# Patient Record
Sex: FEMALE | Race: WHITE | Employment: FULL TIME | URBAN - METROPOLITAN AREA
[De-identification: names, ages, dates, MRNs, and addresses within clinical notes are randomized per-mention and may not be internally consistent; named-entity substitution may affect disease eponyms.]

---

## 2017-01-06 ENCOUNTER — TELEPHONE (OUTPATIENT)
Dept: NURSING | Facility: CLINIC | Age: 52
End: 2017-01-06

## 2017-01-06 ENCOUNTER — TELEPHONE (OUTPATIENT)
Dept: INTERNAL MEDICINE | Facility: CLINIC | Age: 52
End: 2017-01-06

## 2017-01-06 DIAGNOSIS — F98.8 ADD (ATTENTION DEFICIT DISORDER): Primary | ICD-10-CM

## 2017-01-06 RX ORDER — METHYLPHENIDATE HYDROCHLORIDE 20 MG/1
20 TABLET ORAL 2 TIMES DAILY
Qty: 60 TABLET | Refills: 0 | Status: SHIPPED | OUTPATIENT
Start: 2017-01-06 | End: 2017-02-06

## 2017-01-06 NOTE — TELEPHONE ENCOUNTER
Jayleen called the nurse triage line 01/06/2017 @ 3008. She is requesting a refill of Ritalin. She says she only has enough left for today and tomorrow (saturday 01/07/2017). Please call her when the script is ready to be picked up, [phone]. Thank you.     Jenna Styles RN  FNA    Routed to: Dr. Castellanos and KVNG rolon

## 2017-01-06 NOTE — TELEPHONE ENCOUNTER
Call Type: Triage Call    Presenting Problem: Jayleen is requesting a refill of Ritalin. Message  sent to PCP. Message sent ot Dr. Castellanos.  Triage Note:  Guideline Title: Medication Questions - Adult  Recommended Disposition: Provide Health Information  Original Inclination: Wanted to speak with a nurse  Override Disposition:  Intended Action: Follow advice given  Physician Contacted: No  Caller has medication question(s) that was answered with available resources ?  YES  Pregnant and has medication questions regarding prescribed and/or nonprescribed  medication(s) not covered by available resources ? NO  Breastfeeding and has medication questions regarding prescribed and/or  nonprescribed medication(s) not covered by available resources ? NO  Requested information on how to safely dispose of  or unused medications ?  NO  Sign(s) or symptom(s) associated with a diagnosed condition or with a new illness  ? NO  Prescription ordered today and not available at pharmacy putting patient at  clinical risk ? NO  Recurrence of a symptom(s) or illness post prescribed medication treatment AND  provider instructed patient to call if symptom(s) returned. ? NO  Unable to obtain prescribed medication related to available resources AND  situation poses immediate clinical risk ? NO  Pharmacy calling to clarify prescription order. ? NO  Requests refill of prescribed medication that does NOT have a valid refill; lack  of medication may cause clinical risk to patient if not available. ? NO  Has questions about prescribed and/or nonprescribed medications not covered by  available resources ? NO  Pharmacy calling with prescription question; answered per department policy. ? NO  Requests refill of prescribed medication without valid refills OR requests refill  of prescribed medication with valid refills but does not have prescription number  (no RX container); lack of medication does not put patient at clinical risk ? NO  Physician  Instructions:  Care Advice:

## 2017-01-20 DIAGNOSIS — F41.1 GAD (GENERALIZED ANXIETY DISORDER): Primary | ICD-10-CM

## 2017-01-20 RX ORDER — ALPRAZOLAM 0.5 MG
0.5 TABLET ORAL DAILY PRN
Qty: 30 TABLET | Refills: 0 | Status: SHIPPED | OUTPATIENT
Start: 2017-01-20 | End: 2017-02-17

## 2017-01-20 NOTE — TELEPHONE ENCOUNTER
xanax      Last Written Prescription Date:  12/22/16  Last Fill Quantity: 30,   # refills: 0  Last Office Visit with [facility] [facility] or  Health prescribing provider: 11/14/16  Future Office visit:       Routing refill request to provider for review/approval because:  Drug not on the [facility], [facility] or  Health refill protocol or controlled substance

## 2017-02-05 ENCOUNTER — TELEPHONE (OUTPATIENT)
Dept: INTERNAL MEDICINE | Facility: CLINIC | Age: 52
End: 2017-02-05

## 2017-02-05 DIAGNOSIS — F98.8 ADD (ATTENTION DEFICIT DISORDER): Primary | ICD-10-CM

## 2017-02-05 NOTE — TELEPHONE ENCOUNTER
Clinic Action Needed:Yes Please call patient [phone]  Reason for Call:Patient requesting refill of Ritalin. Reporting she is currently out of medication. Please call when prescription is available for .  Controlled Substance Refill Request for Ritalin  Problem List Complete:     checked in past 6 months?  11/14/16    Marilou Madrid RN  [facility]

## 2017-02-06 RX ORDER — METHYLPHENIDATE HYDROCHLORIDE 20 MG/1
20 TABLET ORAL 2 TIMES DAILY
Qty: 60 TABLET | Refills: 0 | Status: SHIPPED | OUTPATIENT
Start: 2017-02-06 | End: 2017-03-03

## 2017-02-06 NOTE — TELEPHONE ENCOUNTER
Ritalin      Last Written Prescription Date:  1/6/17  Last Fill Quantity: 60,   # refills: 0  Last [place] Visit [place] [facility] [facility] or  Health prescribing provider: 11/14/16  Future Office visit:       Routing refill request to provider for review/approval because:  Drug not on the [facility], [facility] or  [facility] refill protocol or controlled substance

## 2017-02-17 DIAGNOSIS — F41.1 GAD (GENERALIZED ANXIETY DISORDER): ICD-10-CM

## 2017-02-17 RX ORDER — ALPRAZOLAM 0.5 MG
0.5 TABLET ORAL DAILY PRN
Qty: 30 TABLET | Refills: 0 | Status: SHIPPED | OUTPATIENT
Start: 2017-02-17 | End: 2017-03-17

## 2017-02-17 NOTE — TELEPHONE ENCOUNTER
XANAX      Last Written Prescription Date:  01/20/17  Last Fill Quantity: 30,   # refills: 0  Last [place] Visit [place] [facility] [facility] or  Health prescribing provider: 11/04/16  Future Office visit:       Routing refill request to provider for review/approval because:  Drug not on the [facility], [facility] or  Health refill protocol or controlled substance

## 2017-03-03 ENCOUNTER — TELEPHONE (OUTPATIENT)
Dept: NURSING | Facility: CLINIC | Age: 52
End: 2017-03-03

## 2017-03-03 DIAGNOSIS — F98.8 ADD (ATTENTION DEFICIT DISORDER): ICD-10-CM

## 2017-03-03 RX ORDER — METHYLPHENIDATE HYDROCHLORIDE 20 MG/1
20 TABLET ORAL 2 TIMES DAILY
Qty: 60 TABLET | Refills: 0 | Status: SHIPPED | OUTPATIENT
Start: 2017-03-03 | End: 2017-04-03

## 2017-03-03 NOTE — TELEPHONE ENCOUNTER
Needs a ritalin refill. Pharmacy: [facility]. Has 4 days of it left. Please call with status today.  Kaylene Villarreal RN-[facility]

## 2017-03-17 DIAGNOSIS — F41.1 GAD (GENERALIZED ANXIETY DISORDER): ICD-10-CM

## 2017-03-17 RX ORDER — ALPRAZOLAM 0.5 MG
0.5 TABLET ORAL DAILY PRN
Qty: 30 TABLET | Refills: 0 | Status: SHIPPED | OUTPATIENT
Start: 2017-03-17 | End: 2017-04-11

## 2017-03-17 NOTE — TELEPHONE ENCOUNTER
Xanax      Last Written Prescription Date:  02/17/17  Last Fill Quantity: 30,   # refills: 0  [place] [facility] [facility] or  Health prescribing provider: 11/14/16  Future Office visit:       Routing refill request to provider for review/approval because:  Drug not on the [facility], [facility] or  Health refill protocol or controlled substance

## 2017-04-03 DIAGNOSIS — F98.8 ADD (ATTENTION DEFICIT DISORDER): ICD-10-CM

## 2017-04-03 RX ORDER — METHYLPHENIDATE HYDROCHLORIDE 20 MG/1
20 TABLET ORAL 2 TIMES DAILY
Qty: 60 TABLET | Refills: 0 | Status: SHIPPED | OUTPATIENT
Start: 2017-04-03 | End: 2017-04-28

## 2017-04-03 NOTE — TELEPHONE ENCOUNTER
Ritalin      Last Written Prescription Date:  3/3/17  Last Fill Quantity: 60,   # refills: 0  Last Office Visit with [facility], [facility] or  Health prescribing provider: 11/14/16  Future Office visit:       Routing refill request to provider for review/approval because:  Drug not on the [facility], [facility] or  Health refill protocol or controlled substance

## 2017-04-03 NOTE — TELEPHONE ENCOUNTER
Reason for Call:  Medication or medication refill:Refill    Do you use a [facility]?  Name of the pharmacy and phone number for the current request:  [facility]    Name of the medication requested: Ritalin    Other request: Pt has 4 pills left    Can we leave a detailed message on this number? YES    Phone number patient can be reached at: Cell number on file:    Telephone Information:   Mobile [phone]       Best Time: anytime    Call taken on 4/3/2017 at 9:54 AM by CADEN PALACIOS

## 2017-04-11 DIAGNOSIS — F41.1 GAD (GENERALIZED ANXIETY DISORDER): ICD-10-CM

## 2017-04-12 RX ORDER — ALPRAZOLAM 0.5 MG
TABLET ORAL
Qty: 30 TABLET | Refills: 0 | Status: SHIPPED | OUTPATIENT
Start: 2017-04-12 | End: 2017-05-14

## 2017-04-12 NOTE — TELEPHONE ENCOUNTER
Xanax      Last Written Prescription Date:  3/17/17  Last Fill Quantity: 30,   # refills: 0  Last Office Visit with [facility], [facility] or  Health prescribing provider: 11/14/16  Future Office visit:       Routing refill request to provider for review/approval because:  Drug not on the [facility], [facility] or  Health refill protocol or controlled substance    Signed CSA form in chart.

## 2017-04-28 DIAGNOSIS — F98.8 ADD (ATTENTION DEFICIT DISORDER): ICD-10-CM

## 2017-04-28 NOTE — TELEPHONE ENCOUNTER
Called pt to update pharmacy information. Rx should be due about 5/3/17.  Attempted to contact patient, no answer, left voice message to call back.      Ritalin      Last Written Prescription Date:  4/3/17  Last Fill Quantity: 60,   # refills: 0  Last Office Visit with [facility], [facility] or [facility] prescribing provider: 11/14/16  Future Office visit:       Routing refill request to provider for review/approval because:  Drug not on the [facility], [facility] or [facility] refill protocol or controlled substance.  Signed CSA form in chart.

## 2017-04-28 NOTE — TELEPHONE ENCOUNTER
Patient requesting a refill of ritalin. Please call her.  Kaylene Villarreal RN-[facility]

## 2017-05-01 RX ORDER — METHYLPHENIDATE HYDROCHLORIDE 20 MG/1
20 TABLET ORAL 2 TIMES DAILY
Qty: 60 TABLET | Refills: 0 | Status: SHIPPED | OUTPATIENT
Start: 2017-05-01 | End: 2017-05-30

## 2017-05-01 NOTE — TELEPHONE ENCOUNTER
Jayleen returning call.  She says she always uses [facility].  Would like rx taken to [facility] when it has been written.

## 2017-05-02 ENCOUNTER — TRANSFERRED RECORDS (OUTPATIENT)
Dept: HEALTH INFORMATION MANAGEMENT | Facility: CLINIC | Age: 52
End: 2017-05-02

## 2017-05-03 NOTE — TELEPHONE ENCOUNTER
Pt calling in to make sure we brought RX do [facility].    I called the pharmacy and they have that Rx ready for the pt.    Kalia BATES RN

## 2017-05-14 DIAGNOSIS — F41.1 GAD (GENERALIZED ANXIETY DISORDER): ICD-10-CM

## 2017-05-15 RX ORDER — ALPRAZOLAM 0.5 MG
TABLET ORAL
Qty: 30 TABLET | Refills: 0 | Status: SHIPPED | OUTPATIENT
Start: 2017-05-15 | End: 2017-06-11

## 2017-05-30 ENCOUNTER — TELEPHONE (OUTPATIENT)
Dept: INTERNAL MEDICINE | Facility: CLINIC | Age: 52
End: 2017-05-30

## 2017-05-30 DIAGNOSIS — F98.8 ADD (ATTENTION DEFICIT DISORDER): ICD-10-CM

## 2017-05-30 RX ORDER — METHYLPHENIDATE HYDROCHLORIDE 20 MG/1
20 TABLET ORAL 2 TIMES DAILY
Qty: 60 TABLET | Refills: 0 | Status: SHIPPED | OUTPATIENT
Start: 2017-05-30 | End: 2017-06-27

## 2017-05-30 NOTE — TELEPHONE ENCOUNTER
Reason for call:  Medication   If this is a refill request, has the caller requested the refill from the pharmacy already? No  Will the patient be using a [facility]? Yes  Name of the [facility]    Name of the medication requested: Ritalin    Other request: Will run out on 6/2    Phone number to reach patient:  Cell number on file:    Telephone Information:   Mobile [phone]       Best Time:  Anytime     Can we leave a detailed message on this number?  YES

## 2017-05-30 NOTE — TELEPHONE ENCOUNTER
Methylphenidate      Last Written Prescription Date:  05/01/17  Last Fill Quantity: 60,   # refills: 0  Last Office Visit with [facility] or [facility] prescribing provider: 11/14/16  Future Office visit:       Routing refill request to provider for review/approval because:  Drug not on the [facility], [facility] or [employer] [employer] refill protocol or controlled substance    NO CSA IN EPIC. [facility].    Please advise, thanks.

## 2017-06-11 DIAGNOSIS — F41.1 GAD (GENERALIZED ANXIETY DISORDER): ICD-10-CM

## 2017-06-12 RX ORDER — ALPRAZOLAM 0.5 MG
TABLET ORAL
Qty: 30 TABLET | Refills: 0 | Status: SHIPPED | OUTPATIENT
Start: 2017-06-12 | End: 2017-07-10

## 2017-06-23 ENCOUNTER — TRANSFERRED RECORDS (OUTPATIENT)
Dept: HEALTH INFORMATION MANAGEMENT | Facility: CLINIC | Age: 52
End: 2017-06-23

## 2017-06-27 ENCOUNTER — TELEPHONE (OUTPATIENT)
Dept: INTERNAL MEDICINE | Facility: CLINIC | Age: 52
End: 2017-06-27

## 2017-06-27 DIAGNOSIS — F98.8 ATTENTION DEFICIT DISORDER, UNSPECIFIED HYPERACTIVITY PRESENCE: Primary | ICD-10-CM

## 2017-06-27 RX ORDER — METHYLPHENIDATE HYDROCHLORIDE 20 MG/1
20 TABLET ORAL 2 TIMES DAILY
Qty: 60 TABLET | Refills: 0 | Status: SHIPPED | OUTPATIENT
Start: 2017-06-27 | End: 2017-07-25

## 2017-06-27 NOTE — TELEPHONE ENCOUNTER
Ritalin      Last Written Prescription Date:  05/30/17  Last Fill Quantity: 60,   # refills: 0  Last Office Visit with [facility] or  Health prescribing provider: 11/14/16  Future Office visit:       Routing refill request to provider for review/approval because:  Drug not on the [facility], [facility] or  Health refill protocol or controlled substance    CSA in epic. [facility] when [facility].    Please advise, thanks.

## 2017-06-27 NOTE — TELEPHONE ENCOUNTER
Reason for Call:  Medication or medication refill:    Do you use a [facility]?  [facility] of the [facility] and phone [facility] for the current request[facility] [phone]    Name of the medication requested: Ritalin    Other request: none    Can we leave a detailed message on this number? Not Applicable    Phone number patient can be reached at: Cell number on file:    Telephone Information:   Mobile [phone]       Best Time: anytime    Call taken on 6/27/2017 at 7:03 AM by Ada Mazariegos

## 2017-07-05 ENCOUNTER — TELEPHONE (OUTPATIENT)
Dept: INTERNAL MEDICINE | Facility: CLINIC | Age: 52
End: 2017-07-05

## 2017-07-05 DIAGNOSIS — N63.0 LUMP OR MASS IN BREAST: Primary | ICD-10-CM

## 2017-07-05 NOTE — TELEPHONE ENCOUNTER
Irasema ([phone]) [facility] calling. Patient had appt for screening mammo today and had told  she had a painful lump R breast but once she got there only the screening mammo was scheduled.  She can be added on today for diagnostic/US but need MD signature on order.  MISBAH Flor R.N.      [facility] diagnostic mammo and R breast US.

## 2017-07-05 NOTE — TELEPHONE ENCOUNTER
Patient finally left breast center after waiting there 1.5 hours.  She really wants to have diagnostic mammo ASAP.  Will partner please sign order or must pt be seen first.  MISBAH Flor R.N.

## 2017-07-10 DIAGNOSIS — F41.1 GAD (GENERALIZED ANXIETY DISORDER): ICD-10-CM

## 2017-07-10 NOTE — TELEPHONE ENCOUNTER
Xanax      Last Written Prescription Date:  06/12/17  Last Fill Quantity: 30,   # refills: 0  Last Office Visit with [facility], [facility] or [facility] prescribing provider: 11/14/16  Future Office visit:    Next 5 appointments (look out 90 days)     Jul 19, 2017  3:00 PM CDT   [facility]    [address] [place] [id number] [phone]                   Routing refill request to provider for review/approval because:  Drug not on the [facility], [facility] or [facility] refill protocol or controlled substance

## 2017-07-13 RX ORDER — ALPRAZOLAM 0.5 MG
TABLET ORAL
Qty: 30 TABLET | Refills: 0 | Status: SHIPPED | OUTPATIENT
Start: 2017-07-13 | End: 2017-08-16

## 2017-07-17 DIAGNOSIS — I10 ESSENTIAL HYPERTENSION: ICD-10-CM

## 2017-07-18 ENCOUNTER — HOSPITAL ENCOUNTER (OUTPATIENT)
Dept: MAMMOGRAPHY | Facility: CLINIC | Age: 52
End: 2017-07-18
Attending: INTERNAL MEDICINE
Payer: COMMERCIAL

## 2017-07-18 ENCOUNTER — HOSPITAL ENCOUNTER (OUTPATIENT)
Dept: ULTRASOUND IMAGING | Facility: CLINIC | Age: 52
Discharge: HOME | End: 2017-07-18
Attending: INTERNAL MEDICINE | Admitting: INTERNAL MEDICINE
Payer: COMMERCIAL

## 2017-07-18 DIAGNOSIS — N63.0 LUMP OR MASS IN BREAST: ICD-10-CM

## 2017-07-18 PROCEDURE — 76642 ULTRASOUND BREAST LIMITED: CPT | Mod: RT

## 2017-07-18 PROCEDURE — G0204 DX MAMMO INCL CAD BI: HCPCS

## 2017-07-18 RX ORDER — LISINOPRIL 20 MG/1
20 TABLET ORAL DAILY
Qty: 30 TABLET | Refills: 0 | Status: SHIPPED | OUTPATIENT
Start: 2017-07-18 | End: 2017-08-09

## 2017-07-18 RX ORDER — HYDROCHLOROTHIAZIDE 12.5 MG/1
12.5 CAPSULE ORAL DAILY
Qty: 30 CAPSULE | Refills: 0 | Status: SHIPPED | OUTPATIENT
Start: 2017-07-18 | End: 2017-08-09

## 2017-07-25 ENCOUNTER — DOCUMENTATION ONLY (OUTPATIENT)
Dept: LAB | Facility: CLINIC | Age: 52
End: 2017-07-25

## 2017-07-25 ENCOUNTER — TRANSFERRED RECORDS (OUTPATIENT)
Dept: HEALTH INFORMATION MANAGEMENT | Facility: CLINIC | Age: 52
End: 2017-07-25

## 2017-07-25 ENCOUNTER — TELEPHONE (OUTPATIENT)
Dept: INTERNAL MEDICINE | Facility: CLINIC | Age: 52
End: 2017-07-25

## 2017-07-25 DIAGNOSIS — F98.8 ATTENTION DEFICIT DISORDER, UNSPECIFIED HYPERACTIVITY PRESENCE: ICD-10-CM

## 2017-07-25 LAB
ALT SERPL-CCNC: 28 U/L (ref 0–78)
AST SERPL-CCNC: 17 U/L (ref 0–37)
CREAT SERPL-MCNC: 1.43 MG/DL (ref 0.6–1.3)
GLUCOSE SERPL-MCNC: 90 MG/DL (ref 70–99)
POTASSIUM SERPL-SCNC: 3.7 MMOL/L (ref 3.5–5.1)

## 2017-07-25 RX ORDER — METHYLPHENIDATE HYDROCHLORIDE 20 MG/1
20 TABLET ORAL 2 TIMES DAILY
Qty: 60 TABLET | Refills: 0 | Status: SHIPPED | OUTPATIENT
Start: 2017-07-25 | End: 2017-08-21

## 2017-07-25 NOTE — TELEPHONE ENCOUNTER
Pt calls saying that she is losing wt that she hasn't tried to lose.  Her back and side have been hurting.  She is very concerned that she might have some sort of cancer.  Says her grandfather  of Pancreatic Cancer, and she just doesn't feel good.  Would like to have lab work checking Thyroid, Lipids, CMP, Cancer Marker, maybe hormone check for menopause?    Dr Castellanos prefers to see her first before he orders labs, so she was advised of this.      Also she says that she ok'd it with her insurance to have her physical early.

## 2017-07-25 NOTE — TELEPHONE ENCOUNTER
Pt calls requesting refill of Ritalin.    Last written 6/27/17, #60 with 0 refills.    Has appt scheduled at the end of the week.    Please take it to [facility] when it is written.

## 2017-07-31 ENCOUNTER — OFFICE VISIT (OUTPATIENT)
Dept: INTERNAL MEDICINE | Facility: CLINIC | Age: 52
End: 2017-07-31
Payer: COMMERCIAL

## 2017-07-31 VITALS
BODY MASS INDEX: 28.32 KG/M2 | DIASTOLIC BLOOD PRESSURE: 70 MMHG | OXYGEN SATURATION: 96 % | HEIGHT: 65 IN | SYSTOLIC BLOOD PRESSURE: 100 MMHG | RESPIRATION RATE: 12 BRPM | WEIGHT: 170 LBS | HEART RATE: 100 BPM | TEMPERATURE: 99.3 F

## 2017-07-31 DIAGNOSIS — E78.2 MIXED HYPERLIPIDEMIA: ICD-10-CM

## 2017-07-31 DIAGNOSIS — Z00.00 ROUTINE GENERAL MEDICAL EXAMINATION AT A HEALTH CARE FACILITY: Primary | ICD-10-CM

## 2017-07-31 DIAGNOSIS — F98.8 ATTENTION DEFICIT DISORDER, UNSPECIFIED HYPERACTIVITY PRESENCE: ICD-10-CM

## 2017-07-31 DIAGNOSIS — I10 ESSENTIAL HYPERTENSION, BENIGN: ICD-10-CM

## 2017-07-31 PROCEDURE — 99396 PREV VISIT EST AGE 40-64: CPT | Performed by: INTERNAL MEDICINE

## 2017-07-31 NOTE — NURSING NOTE
"Chief Complaint   Patient presents with     Physical       Initial /70 (BP Location: Left arm, Patient Position: Chair, Cuff Size: Adult Large)  Pulse 100  Temp 99.3  F (37.4  C) (Oral)  Resp 12  Ht 5' 5\" (1.651 m)  Wt 170 lb (77.1 kg)  LMP 06/30/2017 (Approximate)  SpO2 96%  BMI 28.29 kg/m2 Estimated body mass index is 28.29 kg/(m^2) as calculated from the following:    Height as of this encounter: 5' 5\" (1.651 m).    Weight as of this encounter: 170 lb (77.1 kg).  Medication Reconciliation: complete     GRACE Garcia      "

## 2017-07-31 NOTE — PROGRESS NOTES
SUBJECTIVE:   CC: Jayleen Lang is an 51 year old woman who presents for preventive health visit.     Healthy Habits:    Do you get at least three servings of calcium containing foods daily (dairy, green leafy vegetables, etc.)? Some    Amount of exercise or daily activities, outside of work: 3 to 4 day(s) per week    Problems taking medications regularly No    Medication side effects: No    Have you had an eye exam in the past two years? yes    Do you see a dentist twice per year? yes    Do you have sleep apnea, excessive snoring or daytime drowsiness?no    Breast Ultrasound results  Recheck after Tampon was in for 3 weeks.           Today's PHQ-2 Score: PHQ-2 ( 1999 [employer]) 11/14/2016 8/22/2016   Q1: Little interest or pleasure in doing things 0 0   Q2: Feeling down, depressed or hopeless 0 0   PHQ-2 Score 0 0       Abuse: Current or Past(Physical, Sexual or Emotional)- No  Do you feel safe in your environment - Yes    Social History   Substance Use Topics     Smoking status: Never Smoker     Smokeless tobacco: Never Used     Alcohol use Yes      Comment: 5 beers a week     The patient does not drink >3 drinks per day nor >7 drinks per week.    Reviewed orders with patient.  Reviewed health maintenance and updated orders accordingly - Yes  Labs reviewed in [facility]    Patient over age 50, mutual decision to screen reflected in health maintenance.      Pertinent mammograms are reviewed under the imaging tab.  History of abnormal Pap smear: NO - age 30- 65 PAP every 3 years recommended    Reviewed and updated as needed this visit by clinical staffTobacco  Allergies  Meds  Soc Hx        Reviewed and updated as needed this visit by Provider      Has H/O hyperlipidemia. On medical treatment and diet. No side effects. No muscle weakness, myalgias or upset stomach.   Has h/o HTN. on medical treatment. BP has been controlled. No side effects from medications. No CP, HA, dizziness. good compliance with medications and low  "salt diet.  Wants to try to go off BP and lipid medications, needs recheck         ROS:  C: NEGATIVE for fever, chills, change in weight  I: NEGATIVE for worrisome rashes, moles or lesions  E: NEGATIVE for vision changes or irritation  ENT: NEGATIVE for ear, mouth and throat problems  R: NEGATIVE for significant cough or SOB  B: NEGATIVE for masses, tenderness or discharge  CV: NEGATIVE for chest pain, palpitations or peripheral edema  GI: NEGATIVE for nausea, abdominal pain, heartburn, or change in bowel habits  : NEGATIVE for unusual urinary or vaginal symptoms. Periods are regular.  M: NEGATIVE for significant arthralgias or myalgia  N: NEGATIVE for weakness, dizziness or paresthesias  P: NEGATIVE for changes in mood or affect    OBJECTIVE:   /70 (BP Location: Left arm, Patient Position: Chair, Cuff Size: Adult Large)  Pulse 100  Temp 99.3  F (37.4  C) (Oral)  Resp 12  Ht 5' 5\" (1.651 m)  Wt 170 lb (77.1 kg)  LMP 06/30/2017 (Approximate)  SpO2 96%  BMI 28.29 kg/m2  EXAM:  GENERAL: healthy, alert and no distress  EYES: Eyes grossly normal to inspection, PERRL and conjunctivae and sclerae normal  HENT: ear canals and TM's normal, nose and mouth without ulcers or lesions  NECK: no adenopathy, no asymmetry, masses, or scars and thyroid normal to palpation  RESP: lungs clear to auscultation - no rales, rhonchi or wheezes  BREAST: normal without masses, tenderness or nipple discharge and no palpable axillary masses or adenopathy  CV: regular rate and rhythm, normal S1 S2, no S3 or S4, no murmur, click or rub, no peripheral edema and peripheral pulses strong  ABDOMEN: soft, nontender, no hepatosplenomegaly, no masses and bowel sounds normal  MS: no gross musculoskeletal defects noted, no edema  SKIN: no suspicious lesions or rashes  NEURO: Normal strength and tone, mentation intact and speech normal  PSYCH: mentation appears normal, affect normal/bright    ASSESSMENT/PLAN:       ICD-10-CM    1. Routine " "general medical examination at a health care facility Z00.00 Lipid panel reflex to direct LDL     TSH with free T4 reflex     Basic metabolic panel   2. Essential hypertension, benign I10 TSH with free T4 reflex     Basic metabolic panel   3. Mixed hyperlipidemia E78.2 Lipid panel reflex to direct LDL   4. Attention deficit disorder, unspecified hyperactivity presence F98.8        COUNSELING:   Reviewed preventive health counseling, as reflected in patient instructions       Regular exercise       Healthy diet/nutrition       Vision screening       Hearing screening       Colon cancer screening         reports that she has never smoked. She has never used smokeless tobacco.    Estimated body mass index is 28.29 kg/(m^2) as calculated from the following:    Height as of this encounter: 5' 5\" (1.651 m).    Weight as of this encounter: 170 lb (77.1 kg).   Weight management plan: Discussed healthy diet and exercise guidelines and patient will follow up in 12 months in clinic to re-evaluate.    Counseling Resources:  ATP IV Guidelines  Pooled Cohorts Equation Calculator  Breast Cancer Risk Calculator  FRAX Risk Assessment  ICSI Preventive Guidelines  Dietary Guidelines for Americans, 2010  USDA's MyPlate  ASA Prophylaxis  Lung CA Screening    New Castellanos MD  [facility]  "

## 2017-07-31 NOTE — MR AVS SNAPSHOT
After Visit Summary   7/31/2017    Jayleen Lang    MRN: [id number]           Patient Information     Date Of Birth          1965        Visit Information        Provider Department      7/31/2017 1:00 PM New Castellanos MD [facility]        Today's Diagnoses     Routine general medical examination at a health care facility    -  1    Essential hypertension, benign        Mixed hyperlipidemia        Attention deficit disorder, unspecified hyperactivity presence          Care Instructions      Preventive Health Recommendations  Female Ages 40 to 49    Yearly exam:     See your health care provider every year in order to  1. Review health changes.   2. Discuss preventive care.    3. Review your medicines if your doctor prescribed any.      Get a Pap test every three years (unless you have an abnormal result and your provider advises testing more often).      If you get Pap tests with HPV test, you only need to test every 5 years, unless you have an abnormal result. You do not need a Pap test if your uterus was removed (hysterectomy) and you have not had cancer.      You should be tested each year for STDs (sexually transmitted diseases), if you're at risk.       Ask your doctor if you should have a mammogram.      Have a colonoscopy (test for colon cancer) if someone in your family has had colon cancer or polyps before age 50.       Have a cholesterol test every 5 years.       Have a diabetes test (fasting glucose) after age 45. If you are at risk for diabetes, you should have this test every 3 years.    Shots: Get a flu shot each year. Get a tetanus shot every 10 years.     Nutrition:     Eat at least 5 servings of fruits and vegetables each day.    Eat whole-grain bread, whole-wheat pasta and brown rice instead of white grains and rice.    Talk to your provider about Calcium and Vitamin D.     Lifestyle    Exercise at least 150 minutes a week (an average of 30 minutes a day, 5  "days a week). This will help you control your weight and prevent disease.    Limit alcohol to one drink per day.    No smoking.     Wear sunscreen to prevent skin cancer.    See your dentist every six months for an exam and cleaning.          Follow-ups after your visit        Future tests that were ordered for you today     Open Future Orders        Priority Expected Expires Ordered    Lipid panel reflex to direct LDL Routine  9/30/2017 7/31/2017    TSH with free T4 reflex Routine  9/30/2017 7/31/2017    Basic metabolic panel Routine  9/30/2017 7/31/2017            Who to contact     If you have questions or need follow up information about today's clinic visit or your schedule please contact [facility] directly at [phone].  Normal or non-critical lab and imaging results will be communicated to you by [employer], letter or phone within 4 business days after the clinic has received the results. If you do not hear from us within 7 days, please contact the clinic through [employer] or phone. If you have a critical or abnormal lab result, we will notify you by phone as soon as possible.  Submit refill requests through [employer] M[employer] or call your pharmacy and they will forward the refill request to us. Please allow 3 business days for your refill to be completed.          Additional Information About Your Visit        Order Mapper Information     Order Mapper lets you send messages to your doctor, view your test results, renew your prescriptions, schedule appointments and more. To sign up, go to [email or web address] . Click on \"Log in\" on the left side of the screen, which will take you to the Welcome page. Then click on \"Sign up Now\" on the right side of the page.     You will be asked to enter the access code listed below, as well as some personal information. Please follow the directions to create your username and password.     Your access code is: [email or web address]  Expires: 10/17/2017  3:50 PM     Your access code " "will  in 90 days. If you need help or a new code, please call your [facility] clinic or [phone].        Care EveryWhere ID     This is your Care EveryWhere ID. This could be used by other organizations to access your [facility] medical records  [id number]        Your Vitals Were     Pulse Temperature Respirations Height Last Period Pulse Oximetry    100 99.3  F (37.4  C) (Oral) 12 5' 5\" (1.651 m) 2017 (Approximate) 96%    BMI (Body Mass Index)                   28.29 kg/m2            Blood Pressure from Last 3 Encounters:   17 100/70   16 122/62   16 120/70    Weight from Last 3 Encounters:   17 170 lb (77.1 kg)   16 178 lb (80.7 kg)   16 180 lb (81.6 kg)                 Today's Medication Changes          These changes are accurate as of: 17  1:31 PM.  If you have any questions, ask your nurse or doctor.               These medicines have changed or have updated prescriptions.        Dose/Directions    fluticasone 50 MCG/ACT spray   Commonly known as:  FLONASE   This may have changed:    - when to take this  - reasons to take this   Used for:  Pollen allergies        Dose:  1-2 spray   Spray 1-2 sprays into both nostrils daily   Quantity:  48 g   Refills:  3                Primary Care Provider Office Phone # Fax #    New Castellanos [phone]       [facility] [address] [place] [address]        Equal Access to Services     BOUCHRA OSORIO AH: Hadcammie archer Sobrandy, waaxda luqadaha, qaybta kaalmada alycia, mendez armstrong. So [facility] [phone].    ATENCIÓN: Si habla español, tiene a villagomez disposición servicios gratuitos de asistencia lingüística. Llame al [phone].    We comply with applicable federal civil rights laws and [place] laws. We do not discriminate on the basis of race, color, national origin, age, disability sex, sexual orientation or gender identity.            Thank " you!     Thank you for choosing [facility]  for your care. Our goal is always to provide you with excellent care. Hearing back from our patients is one way we can continue to improve our services. Please take a few minutes to complete the written survey that you may receive in the mail after your visit with us. Thank you!             Your Updated Medication List - Protect others around you: Learn how to safely use, store and throw away your medicines at www.disposemymeds.org.          This list is accurate as of: 7/31/17  1:31 PM.  Always use your most recent med list.                   Brand Name Dispense Instructions for use Diagnosis    ALPRAZolam 0.5 MG tablet    XANAX    30 tablet    TAKE ONE TABLET BY MOUTH EVERY DAY AS NEEDED FOR ANXIETY    REBEKAH (generalized anxiety disorder)       cetirizine 10 MG tablet    zyrTEC    90 tablet    Take 1 tablet (10 mg) by mouth every evening    Seasonal allergies       EPINEPHrine 0.3 MG/0.3ML injection 2-pack    EPIPEN/ADRENACLICK/or ANY BX GENERIC EQUIV    2 each    Inject 0.3 mLs (0.3 mg) into the muscle once as needed for anaphylaxis    History of bee sting allergy       fluticasone 50 MCG/ACT spray    FLONASE    48 g    Spray 1-2 sprays into both nostrils daily    Pollen allergies       gabapentin 600 MG tablet    NEURONTIN    90 tablet    Take 1 tablet (600 mg) by mouth 3 times daily    Neuropathy (H)       hydrochlorothiazide 12.5 MG capsule    MICROZIDE    30 capsule    Take 1 capsule (12.5 mg) by mouth daily    Essential hypertension       lisinopril 20 MG tablet    PRINIVIL/ZESTRIL    30 tablet    Take 1 tablet (20 mg) by mouth daily    Essential hypertension       methylphenidate 20 MG tablet    RITALIN    60 tablet    Take 1 tablet (20 mg) by mouth 2 times daily    Attention deficit disorder, unspecified hyperactivity presence       mometasone 0.1 % cream    ELOCON    45 g    Apply sparingly to affected area twice daily as needed.  Do not apply to  face.    Angular cheilitis       simvastatin 20 MG tablet    ZOCOR    90 tablet    Take 1 tablet (20 mg) by mouth At Bedtime    Mixed hyperlipidemia

## 2017-08-02 DIAGNOSIS — I10 ESSENTIAL HYPERTENSION, BENIGN: ICD-10-CM

## 2017-08-02 DIAGNOSIS — E78.2 MIXED HYPERLIPIDEMIA: ICD-10-CM

## 2017-08-02 DIAGNOSIS — Z00.00 ROUTINE GENERAL MEDICAL EXAMINATION AT A HEALTH CARE FACILITY: ICD-10-CM

## 2017-08-02 LAB
ANION GAP SERPL CALCULATED.3IONS-SCNC: 8 MMOL/L (ref 3–14)
BUN SERPL-MCNC: 16 MG/DL (ref 7–30)
CALCIUM SERPL-MCNC: 9.4 MG/DL (ref 8.5–10.1)
CHLORIDE SERPL-SCNC: 105 MMOL/L (ref 94–109)
CHOLEST SERPL-MCNC: 185 MG/DL
CO2 SERPL-SCNC: 29 MMOL/L (ref 20–32)
CREAT SERPL-MCNC: 1.02 MG/DL (ref 0.52–1.04)
GFR SERPL CREATININE-BSD FRML MDRD: 57 ML/MIN/1.7M2
GLUCOSE SERPL-MCNC: 91 MG/DL (ref 70–99)
HDLC SERPL-MCNC: 81 MG/DL
LDLC SERPL CALC-MCNC: 94 MG/DL
NONHDLC SERPL-MCNC: 104 MG/DL
POTASSIUM SERPL-SCNC: 3.9 MMOL/L (ref 3.4–5.3)
SODIUM SERPL-SCNC: 142 MMOL/L (ref 133–144)
TRIGL SERPL-MCNC: 49 MG/DL
TSH SERPL DL<=0.005 MIU/L-ACNC: 0.86 MU/L (ref 0.4–4)

## 2017-08-02 PROCEDURE — 80061 LIPID PANEL: CPT | Performed by: INTERNAL MEDICINE

## 2017-08-02 PROCEDURE — 84443 ASSAY THYROID STIM HORMONE: CPT | Performed by: INTERNAL MEDICINE

## 2017-08-02 PROCEDURE — 80048 BASIC METABOLIC PNL TOTAL CA: CPT | Performed by: INTERNAL MEDICINE

## 2017-08-02 PROCEDURE — 36415 COLL VENOUS BLD VENIPUNCTURE: CPT | Performed by: INTERNAL MEDICINE

## 2017-08-09 DIAGNOSIS — I10 ESSENTIAL HYPERTENSION: ICD-10-CM

## 2017-08-09 RX ORDER — HYDROCHLOROTHIAZIDE 12.5 MG/1
12.5 CAPSULE ORAL DAILY
Qty: 90 CAPSULE | Refills: 3 | Status: SHIPPED | OUTPATIENT
Start: 2017-08-09 | End: 2018-08-11

## 2017-08-09 RX ORDER — LISINOPRIL 20 MG/1
TABLET ORAL
Qty: 90 TABLET | Refills: 3 | Status: SHIPPED | OUTPATIENT
Start: 2017-08-09 | End: 2018-08-11

## 2017-08-09 NOTE — TELEPHONE ENCOUNTER
Prescription's approved per [facility] Refill Protocol.

## 2017-08-09 NOTE — TELEPHONE ENCOUNTER
Hydrochlorothiazide      Last Written Prescription Date: 07/18/17  Last Fill Quantity: 30, # refills: 0  Last Office Visit with [facility], [facility] or [facility] prescribing provider: 07/31/17       Potassium   Date Value Ref Range Status   08/02/2017 3.9 3.4 - 5.3 mmol/L Final     Creatinine   Date Value Ref Range Status   08/02/2017 1.02 0.52 - 1.04 mg/dL Final     BP Readings from Last 3 Encounters:   07/31/17 100/70   11/14/16 122/62   08/29/16 120/70     Lisinopril      Last Written Prescription Date: 07/18/17  Last Fill Quantity: 30, # refills: 0  Last Office Visit with [facility], [facility] or [facility] prescribing provider: 07/31/17       Potassium   Date Value Ref Range Status   08/02/2017 3.9 3.4 - 5.3 mmol/L Final     Creatinine   Date Value Ref Range Status   08/02/2017 1.02 0.52 - 1.04 mg/dL Final     BP Readings from Last 3 Encounters:   07/31/17 100/70   11/14/16 122/62   08/29/16 120/70

## 2017-08-15 DIAGNOSIS — F41.1 GAD (GENERALIZED ANXIETY DISORDER): ICD-10-CM

## 2017-08-16 RX ORDER — ALPRAZOLAM 0.5 MG
TABLET ORAL
Qty: 30 TABLET | Refills: 0 | Status: SHIPPED | OUTPATIENT
Start: 2017-08-16 | End: 2017-09-13

## 2017-08-21 ENCOUNTER — TELEPHONE (OUTPATIENT)
Dept: INTERNAL MEDICINE | Facility: CLINIC | Age: 52
End: 2017-08-21

## 2017-08-21 DIAGNOSIS — F98.8 ATTENTION DEFICIT DISORDER, UNSPECIFIED HYPERACTIVITY PRESENCE: ICD-10-CM

## 2017-08-21 RX ORDER — METHYLPHENIDATE HYDROCHLORIDE 20 MG/1
20 TABLET ORAL 2 TIMES DAILY
Qty: 60 TABLET | Refills: 0 | Status: SHIPPED | OUTPATIENT
Start: 2017-08-21 | End: 2017-09-19

## 2017-08-21 NOTE — TELEPHONE ENCOUNTER
Reason for Call:  Medication or medication refill:Refill    Do you use a [facility]?  [facility] of the [facility] and [facility] the [facility] [address] [facility] - [phone]    Name of the medication requested: methylphenidate (RITALIN) 20 MG tablet    Other request: Due for refill     Can we leave a detailed message on this number? YES    Phone number patient can be reached at: [employer] [phone]    Best Time: anytime    Call taken on 8/21/2017 at 8:53 AM by CADEN PALACIOS

## 2017-08-21 NOTE — TELEPHONE ENCOUNTER
Methylphenidate (Ritalin)    Last Written Prescription Date:  7/25/2017  Last Fill Quantity: 60,   # refills: 0  Last Office Visit with [facility], [facility] or [facility] prescribing provider: 7/31/2017  Future Office visit:       Routing refill request to provider for review/approval because:  Drug not on the [facility], [facility] or [facility] refill protocol or controlled substance

## 2017-08-26 ENCOUNTER — HEALTH MAINTENANCE LETTER (OUTPATIENT)
Age: 52
End: 2017-08-26

## 2017-08-30 DIAGNOSIS — Z91.030 HISTORY OF BEE STING ALLERGY: ICD-10-CM

## 2017-08-30 NOTE — TELEPHONE ENCOUNTER
EPIPEN      Last Written Prescription Date: 08/29/16  Last Fill Quantity: 2,  # refills: 3   Last Office Visit with [facility], [facility] or [facility] prescribing provider: 07/31/17

## 2017-08-31 RX ORDER — EPINEPHRINE 0.3 MG/.3ML
0.3 INJECTION SUBCUTANEOUS
Qty: 0.3 ML | Refills: 1 | Status: SHIPPED | OUTPATIENT
Start: 2017-08-31 | End: 2018-06-12

## 2017-09-13 DIAGNOSIS — F41.1 GAD (GENERALIZED ANXIETY DISORDER): ICD-10-CM

## 2017-09-13 DIAGNOSIS — G62.9 NEUROPATHY: ICD-10-CM

## 2017-09-13 NOTE — TELEPHONE ENCOUNTER
XANAX      Last Written Prescription Date:  08/16/17  Last Fill Quantity: 30,   # refills: 0  Last [facility] Visit with [facility] or [facility] provider: 07/31/17  Future Office visit:    Next 5 appointments (look out 90 days)     Sep 19, 2017  5:00 PM CDT   Pre-Op physical with Ignacio Santos MD   [facility]    [address]  [facility] [address]   [phone]                   Routing refill request to provider for review/approval because:  Drug not on the [facility], [facility] or [facility] refill protocol or controlled substance

## 2017-09-14 NOTE — TELEPHONE ENCOUNTER
GABAPENTIN      Last Written Prescription Date:  12/14/16  Last Fill Quantity: 90,   # refills: 5  Last Office Visit with [facility] or [facility] prescribing provider: 07/31/17  Future Office visit:    Next 5 appointments (look out 90 days)     Sep 19, 2017  5:00 PM CDT   [facility]    [address]  [facility] [address]   [phone]                   Routing refill request to provider for review/approval because:  Drug not on the [facility], [facility] or [facility] refill protocol or controlled substance

## 2017-09-15 RX ORDER — GABAPENTIN 600 MG/1
TABLET ORAL
Qty: 90 TABLET | Refills: 4 | Status: SHIPPED | OUTPATIENT
Start: 2017-09-15 | End: 2018-05-24

## 2017-09-15 RX ORDER — ALPRAZOLAM 0.5 MG
TABLET ORAL
Qty: 30 TABLET | Refills: 0 | Status: SHIPPED | OUTPATIENT
Start: 2017-09-15 | End: 2017-10-10

## 2017-09-18 ENCOUNTER — TELEPHONE (OUTPATIENT)
Dept: INTERNAL MEDICINE | Facility: CLINIC | Age: 52
End: 2017-09-18

## 2017-09-18 DIAGNOSIS — F98.8 ATTENTION DEFICIT DISORDER, UNSPECIFIED HYPERACTIVITY PRESENCE: ICD-10-CM

## 2017-09-18 NOTE — TELEPHONE ENCOUNTER
(Reason for Call:  Medication or medication refill:    Do you use a [facility]?  Name of the pharmacy and phone number for the current request:  [facility] [address] [facility] [address] [place] - [phone]    Name of the medication requested: RITALIN    Other request: NO    Can we leave a detailed message on this number? YES    Phone number patient can be reached at: Cell number on file:    Telephone Information:   Mobile [phone]       Best Time: ANY    Call taken on 9/18/2017 at 4:06 PM by Mae Meek

## 2017-09-19 RX ORDER — METHYLPHENIDATE HYDROCHLORIDE 20 MG/1
20 TABLET ORAL 2 TIMES DAILY
Qty: 60 TABLET | Refills: 0 | Status: SHIPPED | OUTPATIENT
Start: 2017-09-19 | End: 2017-10-19

## 2017-09-19 NOTE — TELEPHONE ENCOUNTER
Ritalin     CSA signed.   Last Written Prescription Date:  8/21/17  Last Fill Quantity: 60,   # refills: 0    Last Office Visit with [facility], [facility] or [facility] prescribing provider: 7/31/17    Future Office visit:    Next 5 appointments (look out 90 days)     Sep 19, 2017  5:00 PM CDT   [facility]    [address]  [facility] [id number] [phone]                 Routing refill request to provider for review/approval because:  Drug not on the [facility], [facility] or [facility] refill protocol or controlled substance

## 2017-09-21 ENCOUNTER — OFFICE VISIT (OUTPATIENT)
Dept: INTERNAL MEDICINE | Facility: CLINIC | Age: 52
End: 2017-09-21
Payer: COMMERCIAL

## 2017-09-21 VITALS
OXYGEN SATURATION: 96 % | WEIGHT: 175 LBS | TEMPERATURE: 99 F | BODY MASS INDEX: 29.16 KG/M2 | SYSTOLIC BLOOD PRESSURE: 110 MMHG | DIASTOLIC BLOOD PRESSURE: 80 MMHG | HEIGHT: 65 IN | HEART RATE: 100 BPM

## 2017-09-21 DIAGNOSIS — M16.12 PRIMARY OSTEOARTHRITIS OF LEFT HIP: ICD-10-CM

## 2017-09-21 DIAGNOSIS — Z01.818 PREOP GENERAL PHYSICAL EXAM: Primary | ICD-10-CM

## 2017-09-21 LAB
ERYTHROCYTE [DISTWIDTH] IN BLOOD BY AUTOMATED COUNT: 13.9 % (ref 10–15)
HCT VFR BLD AUTO: 40.3 % (ref 35–47)
HGB BLD-MCNC: 13.2 G/DL (ref 11.7–15.7)
MCH RBC QN AUTO: 29.9 PG (ref 26.5–33)
MCHC RBC AUTO-ENTMCNC: 32.8 G/DL (ref 31.5–36.5)
MCV RBC AUTO: 91 FL (ref 78–100)
PLATELET # BLD AUTO: 238 10E9/L (ref 150–450)
RBC # BLD AUTO: 4.41 10E12/L (ref 3.8–5.2)
WBC # BLD AUTO: 6.9 10E9/L (ref 4–11)

## 2017-09-21 PROCEDURE — 87081 CULTURE SCREEN ONLY: CPT | Performed by: FAMILY MEDICINE

## 2017-09-21 PROCEDURE — 93000 ELECTROCARDIOGRAM COMPLETE: CPT | Performed by: FAMILY MEDICINE

## 2017-09-21 PROCEDURE — 87186 SC STD MICRODIL/AGAR DIL: CPT | Performed by: FAMILY MEDICINE

## 2017-09-21 PROCEDURE — 99214 OFFICE O/P EST MOD 30 MIN: CPT | Performed by: FAMILY MEDICINE

## 2017-09-21 PROCEDURE — 36415 COLL VENOUS BLD VENIPUNCTURE: CPT | Performed by: FAMILY MEDICINE

## 2017-09-21 PROCEDURE — 80048 BASIC METABOLIC PNL TOTAL CA: CPT | Performed by: FAMILY MEDICINE

## 2017-09-21 PROCEDURE — 85027 COMPLETE CBC AUTOMATED: CPT | Performed by: FAMILY MEDICINE

## 2017-09-21 NOTE — PROGRESS NOTES
[facility]  [address]  [facility] [address]  [phone]  Dept: [phone]    PRE-OP EVALUATION:  Today's date: 2017    Jayleen Lang (: 1965) presents for pre-operative evaluation assessment as requested by Dr. Andrzej Malagon .  She requires evaluation and anesthesia risk assessment prior to undergoing surgery/procedure for treatment of left hip  .  Proposed procedure: left hip replacement     Date of Surgery/ Procedure: 17  Time of Surgery/ Procedure: 7:00am   Hospital/Surgical Facility: [facility]     Primary Physician: New Castellanos  Type of Anesthesia Anticipated: Choice    Patient has a Health Care Directive or Living Will:  NO    1. NO - Do you have a history of heart attack, stroke, stent, bypass or surgery on an artery in the head, neck, heart or legs?  2. NO - Do you ever have any pain or discomfort in your chest?  3. NO - Do you have a history of  Heart Failure?  4. NO - Are you troubled by shortness of breath when: walking on the level, up a slight hill or at night?  5. NO - Do you currently have a cold, bronchitis or other respiratory infection?  6. NO - Do you have a cough, shortness of breath or wheezing?  7. NO - Do you sometimes get pains in the calves of your legs when you walk?  8. NO - Do you or anyone in your family have previous history of blood clots?  9. NO - Do you or does anyone in your family have a serious bleeding problem such as prolonged bleeding following surgeries or cuts?  10. NO - Have you ever had problems with anemia or been told to take iron pills?  11. NO - Have you had any abnormal blood loss such as black, tarry or bloody stools, or abnormal vaginal bleeding?  12. NO - Have you ever had a blood transfusion?  13. NO - Have you or any of your relatives ever had problems with anesthesia?  14. NO - Do you have sleep apnea, excessive snoring or daytime drowsiness?  16. NO - DO YOU HAVE PROSTHETIC  JOINTS?   16. NO - Do you have prosthetic joints?  17. NO - Is there any chance that you may be pregnant?        HPI:                                                      Brief HPI related to upcoming procedure:     She has advanced DJD in L hip. Plans NADIR.      No h/o Anesthesia complications.    MEDICAL HISTORY:                                                    Patient Active Problem List    Diagnosis Date Noted     Controlled substance agreement signed 2016     Priority: Medium     Chronic neck pain 11/15/2016     Priority: Medium     Essential hypertension, benign 2016     Priority: Medium     Allergic reaction 2016     Priority: Medium     To Bees  Airway closure       Mixed hyperlipidemia 2016     Priority: Medium     REBEKAH (generalized anxiety disorder) 2014     Priority: Medium     Attention deficit disorder, unspecified hyperactivity presence 08/15/2013     Priority: Medium     Obesity 2013     Priority: Medium     Menorrhagia 2012     Priority: Medium     Rash 2011     Priority: Medium     CARDIOVASCULAR SCREENING; LDL GOAL LESS THAN 160 10/31/2010     Priority: Medium      Past Medical History:   Diagnosis Date     ADD (attention deficit disorder)      Obesity      Past Surgical History:   Procedure Laterality Date     ARTHROSCOPIC REPAIR POSTERIOR CRUCIATE LIGAMENT        DELIVERY ONLY  10/1998     Foot surgery - bone spurs  2002     LAMINECT/DISCECTOMY, CERVICAL      C6-C7 Fusion.     Current Outpatient Prescriptions   Medication Sig Dispense Refill     methylphenidate (RITALIN) 20 MG tablet Take 1 tablet (20 mg) by mouth 2 times daily 60 tablet 0     ALPRAZolam (XANAX) 0.5 MG tablet TAKE ONE TABLET BY MOUTH EVERY DAY AS NEEDED FOR ANXIETY 30 tablet 0     gabapentin (NEURONTIN) 600 MG tablet TAKE ONE TABLET BY MOUTH 3 TIMES DAILY. 90 tablet 4     EPINEPHrine (EPIPEN/ADRENACLICK/OR ANY BX GENERIC EQUIV) 0.3 MG/0.3ML injection 2-pack Inject 0.3 mLs  "(0.3 mg) into the muscle once as needed for anaphylaxis 0.3 mL 1     hydrochlorothiazide (MICROZIDE) 12.5 MG capsule Take 1 capsule (12.5 mg) by mouth daily 90 capsule 3     lisinopril (PRINIVIL/ZESTRIL) 20 MG tablet TAKE ONE TABLET BY MOUTH DAILY 90 tablet 3     simvastatin (ZOCOR) 20 MG tablet Take 1 tablet (20 mg) by mouth At Bedtime 90 tablet 3     fluticasone (FLONASE) 50 MCG/ACT nasal spray Spray 1-2 sprays into both nostrils daily (Patient taking differently: Spray 1-2 sprays into both nostrils daily as needed ) 48 g 3     mometasone (ELOCON) 0.1 % cream Apply sparingly to affected area twice daily as needed.  Do not apply to face. 45 g 0     cetirizine (ZYRTEC) 10 MG tablet Take 1 tablet (10 mg) by mouth every evening 90 tablet 3     OTC products: NSAIDS    Allergies   Allergen Reactions     Bee Venom      Sulfa Drugs Hives      Latex Allergy: NO    Social History   Substance Use Topics     Smoking status: Never Smoker     Smokeless tobacco: Never Used     Alcohol use Yes      Comment: 5 beers a week     History   Drug Use No       REVIEW OF SYSTEMS:                                                    Constitutional, neuro, ENT, endocrine, pulmonary, cardiac, gastrointestinal, genitourinary, musculoskeletal, integument and psychiatric systems are negative, except as otherwise noted.      EXAM:                                                    /80  Pulse 100  Temp 99  F (37.2  C) (Oral)  Ht 5' 5\" (1.651 m)  Wt 175 lb (79.4 kg)  SpO2 96%  BMI 29.12 kg/m2    GENERAL APPEARANCE: healthy, alert and no distress     EYES: EOMI, PERRL     HENT: mouth without ulcers or lesions     NECK: no adenopathy, no asymmetry, masses, or scars and thyroid normal to palpation     RESP: lungs clear to auscultation -     CV: regular rates and rhythm, normal S1 S2, no murmur, click or rub     ABDOMEN:  soft, nontender, no HSM or masses      MS: decr ROM L hip     SKIN: no suspicious lesions or rashes     NEURO: Normal " strength and tone     PSYCH: mentation appears normal. and affect normal/bright    DIAGNOSTICS:                                                        EKG: RSR, rate 73, axis normal, ST segments and T waves normal, no ectopy. Tracing WNL.      Results for orders placed or performed in visit on 09/21/17   Basic metabolic panel  (Ca, Cl, CO2, Creat, Gluc, K, Na, BUN)   Result Value Ref Range    Sodium 140 133 - 144 mmol/L    Potassium 3.7 3.4 - 5.3 mmol/L    Chloride 103 94 - 109 mmol/L    Carbon Dioxide 28 20 - 32 mmol/L    Anion Gap 9 3 - 14 mmol/L    Glucose 88 70 - 99 mg/dL    Urea Nitrogen 20 7 - 30 mg/dL    Creatinine 0.92 0.52 - 1.04 mg/dL    GFR Estimate 65 >60 mL/min/1.7m2    GFR Estimate If Black 78 >60 mL/min/1.7m2    Calcium 9.2 8.5 - 10.1 mg/dL   CBC with platelets   Result Value Ref Range    WBC 6.9 4.0 - 11.0 10e9/L    RBC Count 4.41 3.8 - 5.2 10e12/L    Hemoglobin 13.2 11.7 - 15.7 g/dL    Hematocrit 40.3 35.0 - 47.0 %    MCV 91 78 - 100 fl    MCH 29.9 26.5 - 33.0 pg    MCHC 32.8 31.5 - 36.5 g/dL    RDW 13.9 10.0 - 15.0 %    Platelet Count 238 150 - 450 10e9/L   Methicillin resistant staph aureus cult   Result Value Ref Range    Specimen Description Nasal     Special Requests Specimen collected in eSwab transport (white cap)     Culture Micro Culture in progress          Recent Labs   Lab Test  08/02/17   0844 07/25/17 08/22/16   0740  03/22/16   0752  10/22/15   0720   06/24/10   0750   HGB   --    --    --   14.4  15.1   < >  13.6   PLT   --    --    --   287  318   < >   --    INR   --    --    --    --    --    --   0.91   NA  142   --   137  140  135   < >  139   POTASSIUM  3.9  3.7  4.1  4.4  3.6   < >  4.3   CR  1.02  1.43*  0.86  0.82  0.89   < >  1.07*    < > = values in this interval not displayed.        IMPRESSION:                                                    Diagnosis/reason for consult:       (Z01.818) Preop general physical exam  (primary encounter diagnosis)  Comment: satis  Plan:  Basic metabolic panel  (Ca, Cl, CO2, Creat,         Gluc, K, Na, BUN), CBC with platelets,         Methicillin resistant staph aureus cult, EKG         12-lead complete w/read - Clinics, CANCELED:         EKG 12-lead complete w/read - Clinics            (M16.12) Primary osteoarthritis of left hip  Comment:   Plan:         The proposed surgical procedure is considered INTERMEDIATE risk.    REVISED CARDIAC RISK INDEX  The patient has the following serious cardiovascular risks for perioperative complications such as (MI, PE, VFib and 3  AV Block):  No serious cardiac risks  INTERPRETATION: 2 risks: Class III (moderate risk - 6.6% complication rate)    The patient has the following additional risks for perioperative complications:  No identified additional risks      ICD-10-CM    1. Preop general physical exam Z01.818 Basic metabolic panel  (Ca, Cl, CO2, Creat, Gluc, K, Na, BUN)     CBC with platelets     Methicillin resistant staph aureus cult     EKG 12-lead complete w/read - Clinics   2. Primary osteoarthritis of left hip M16.12        RECOMMENDATIONS:                                                      --Consult hospital rounder / IM to assist post-op medical management    --Patient is to take her BP medications on the day of surgery.    APPROVAL GIVEN to proceed with proposed procedure, without further diagnostic evaluation       Signed Electronically by: Ignacio Santos MD    Copy of this evaluation report is provided to requesting physician.    [facility] Preop Guidelines

## 2017-09-21 NOTE — MR AVS SNAPSHOT
After Visit Summary   9/21/2017    Jayleen Lang    MRN: [id number]           Patient Information     Date Of Birth          1965        Visit Information        Provider Department      9/21/2017 4:20 PM Ignacio Santos MD [facility]        Today's Diagnoses     Preop general physical exam    -  1    Primary osteoarthritis of left hip          Care Instructions      Before Your Surgery      Call your surgeon if there is any change in your health. This includes signs of a cold or flu (such as a sore throat, runny nose, cough, rash or fever).    Do not smoke, drink alcohol or take over the counter medicine (unless your surgeon or primary care doctor tells you to) for the 24 hours before and after surgery.    If you take prescribed drugs: Follow your doctor s orders about which medicines to take and which to stop until after surgery.    Eating and drinking prior to surgery: follow the instructions from your surgeon    Take a shower or bath the night before surgery. Use the soap your surgeon gave you to gently clean your skin. If you do not have soap from your surgeon, use your regular soap. Do not shave or scrub the surgery site.  Wear clean pajamas and have clean sheets on your bed.           Follow-ups after your visit        Who to contact     If you have questions or need follow up information about today's clinic visit or your schedule please contact [facility] directly at [phone].  Normal or non-critical lab and imaging results will be communicated to you by MyChart, letter or phone within 4 business days after the clinic has received the results. If you do not hear from us within 7 days, please contact the clinic through MyChart or phone. If you have a critical or abnormal lab result, we will notify you by phone as soon as possible.  Submit refill requests through [employer] or call your pharmacy and they will forward the refill request to us. Please allow  "3 business days for your refill to be completed.          Additional Information About Your Visit        MyChart Information     [employer] lets you send messages to your doctor, view your test results, renew your prescriptions, schedule appointments and more. To sign up, go to [email or web address] . Click on \"Log in\" on the left side of the screen, which will take you to the Welcome page. Then click on \"Sign up Now\" on the right side of the page.     You will be asked to enter the access code listed below, as well as some personal information. Please follow the directions to create your username and password.     Your access code is: [email or web address]  Expires: 10/17/2017  3:50 PM     Your access code will  in 90 days. If you need help or a new code, please call your [place] clinic or [phone].        Care EveryWhere ID     This is your Care EveryWhere ID. This could be used by other organizations to access your [facility] medical records  [id number]        Your Vitals Were     Pulse Temperature Height Pulse Oximetry BMI (Body Mass Index)       100 99  F (37.2  C) (Oral) 5' 5\" (1.651 m) 96% 29.12 kg/m2        Blood Pressure from Last 3 Encounters:   17 110/80   17 100/70   16 122/62    Weight from Last 3 Encounters:   17 175 lb (79.4 kg)   17 170 lb (77.1 kg)   16 178 lb (80.7 kg)              We Performed the Following     Basic metabolic panel  (Ca, Cl, CO2, Creat, Gluc, K, Na, BUN)     CBC with platelets     EKG 12-lead complete w/read - Clinics     Methicillin resistant staph aureus cult          Today's Medication Changes          These changes are accurate as of: 17 11:59 PM.  If you have any questions, ask your nurse or doctor.               These medicines have changed or have updated prescriptions.        Dose/Directions    fluticasone 50 MCG/ACT spray   Commonly known as:  FLONASE   This may have changed:    - when to take this  - reasons to take this   Used " for:  Pollen allergies        Dose:  1-2 spray   Spray 1-2 sprays into both nostrils daily   Quantity:  48 g   Refills:  3                Primary Care Provider Office Phone # Fax #    New Castellanos [phone]       [address] [facility] [address]        Equal Access to Services     [facility] CenterZACHARY : Hadii aad ku hadasho Soomaali, waaxda luqadaha, qaybta kaalmada adeegyada, waxay idiin hayaan adeeg khhonoriosh la'aan . So [facility] [phone].    ATENCIÓN: Si habla español, tiene a villagomez disposición servicios gratuitos de asistencia lingüística. Viriame al [phone].    We comply with applicable federal civil rights laws and [place] laws. We do not discriminate on the basis of race, color, national origin, age, disability sex, sexual orientation or gender identity.            Thank you!     Thank you for choosing [facility]  for your care. Our goal is always to provide you with excellent care. Hearing back from our patients is one way we can continue to improve our services. Please take a few minutes to complete the written survey that you may receive in the mail after your visit with us. Thank you!             Your Updated Medication List - Protect others around you: Learn how to safely use, store and throw away your medicines at www.disposemymeds.org.          This list is accurate as of: 9/21/17 11:59 PM.  Always use your most recent med list.                   Brand Name Dispense Instructions for use Diagnosis    ALPRAZolam 0.5 MG tablet    XANAX    30 tablet    TAKE ONE TABLET BY MOUTH EVERY DAY AS NEEDED FOR ANXIETY    REBEKAH (generalized anxiety disorder)       cetirizine 10 MG tablet    zyrTEC    90 tablet    Take 1 tablet (10 mg) by mouth every evening    Seasonal allergies       EPINEPHrine 0.3 MG/0.3ML injection 2-pack    EPIPEN/ADRENACLICK/or ANY BX GENERIC EQUIV    0.3 mL    Inject 0.3 mLs (0.3 mg) into the muscle once as needed for anaphylaxis    History of bee sting  allergy       fluticasone 50 MCG/ACT spray    FLONASE    48 g    Spray 1-2 sprays into both nostrils daily    Pollen allergies       gabapentin 600 MG tablet    NEURONTIN    90 tablet    TAKE ONE TABLET BY MOUTH 3 TIMES DAILY.    Neuropathy (H)       hydrochlorothiazide 12.5 MG capsule    MICROZIDE    90 capsule    Take 1 capsule (12.5 mg) by mouth daily    Essential hypertension       lisinopril 20 MG tablet    PRINIVIL/ZESTRIL    90 tablet    TAKE ONE TABLET BY MOUTH DAILY    Essential hypertension       methylphenidate 20 MG tablet    RITALIN    60 tablet    Take 1 tablet (20 mg) by mouth 2 times daily    Attention deficit disorder, unspecified hyperactivity presence       mometasone 0.1 % cream    ELOCON    45 g    Apply sparingly to affected area twice daily as needed.  Do not apply to face.    Angular cheilitis       simvastatin 20 MG tablet    ZOCOR    90 tablet    Take 1 tablet (20 mg) by mouth At Bedtime    Mixed hyperlipidemia

## 2017-09-22 ENCOUNTER — TELEPHONE (OUTPATIENT)
Dept: INTERNAL MEDICINE | Facility: CLINIC | Age: 52
End: 2017-09-22

## 2017-09-22 LAB
ANION GAP SERPL CALCULATED.3IONS-SCNC: 9 MMOL/L (ref 3–14)
BUN SERPL-MCNC: 20 MG/DL (ref 7–30)
CALCIUM SERPL-MCNC: 9.2 MG/DL (ref 8.5–10.1)
CHLORIDE SERPL-SCNC: 103 MMOL/L (ref 94–109)
CO2 SERPL-SCNC: 28 MMOL/L (ref 20–32)
CREAT SERPL-MCNC: 0.92 MG/DL (ref 0.52–1.04)
GFR SERPL CREATININE-BSD FRML MDRD: 65 ML/MIN/1.7M2
GLUCOSE SERPL-MCNC: 88 MG/DL (ref 70–99)
POTASSIUM SERPL-SCNC: 3.7 MMOL/L (ref 3.4–5.3)
SODIUM SERPL-SCNC: 140 MMOL/L (ref 133–144)

## 2017-09-22 NOTE — TELEPHONE ENCOUNTER
[facility] calling.  Req preop to be faxed to [phone]. States surgery scheduled 9-26-17.    Encounter not closed yet.  Pt saw Dr. Santos.  He will be back in the clinic 9-25-17.    Message routed to him for completion.

## 2017-09-24 LAB
BACTERIA SPEC CULT: ABNORMAL
Lab: ABNORMAL
SPECIMEN SOURCE: ABNORMAL

## 2017-10-10 ENCOUNTER — TRANSFERRED RECORDS (OUTPATIENT)
Dept: HEALTH INFORMATION MANAGEMENT | Facility: CLINIC | Age: 52
End: 2017-10-10

## 2017-10-10 DIAGNOSIS — F41.1 GAD (GENERALIZED ANXIETY DISORDER): ICD-10-CM

## 2017-10-10 RX ORDER — ALPRAZOLAM 0.5 MG
TABLET ORAL
Qty: 30 TABLET | Refills: 0 | Status: SHIPPED | OUTPATIENT
Start: 2017-10-10 | End: 2017-11-01

## 2017-10-10 NOTE — TELEPHONE ENCOUNTER
Alprazolam      Last Written Prescription Date:  9/15/17  Last Fill Quantity: 30,   # refills: 0  Last Office Visit with [facility] or  Health prescribing provider: 9/21/17  Future Office visit: none      Routing refill request to provider for review/approval because:  Drug not on the [facility], [facility] or  [facility] refill protocol or controlled substance

## 2017-10-18 ENCOUNTER — TELEPHONE (OUTPATIENT)
Dept: INTERNAL MEDICINE | Facility: CLINIC | Age: 52
End: 2017-10-18

## 2017-10-18 DIAGNOSIS — F98.8 ATTENTION DEFICIT DISORDER, UNSPECIFIED HYPERACTIVITY PRESENCE: ICD-10-CM

## 2017-10-18 NOTE — TELEPHONE ENCOUNTER
Reason for Call:  Medication or medication refill:    Do you use a [facility]?  Name of the pharmacy and phone number for the current request:  [facility] [address] [place] - [phone]    Name of the medication requested: ritalin    Other request: none    Can we leave a detailed message on this number? YES    Phone number patient can be reached at: Home number on file [phone] (home)    Best Time: anytime    Call taken on 10/18/2017 at 5:46 PM by Paola Velez

## 2017-10-19 NOTE — TELEPHONE ENCOUNTER
[facility]    Ritalin      Last Written Prescription Date:  9-19-17  Last Fill Quantity: 60,   # refills: 0  Future Office visit: 9-21-17 preop    Routing refill request to provider for review/approval because:  Drug not on the [facility], [facility] or [facility] refill protocol or controlled substance    Signed CSA form in chart.    Please advise, thanks.

## 2017-10-20 RX ORDER — METHYLPHENIDATE HYDROCHLORIDE 20 MG/1
20 TABLET ORAL 2 TIMES DAILY
Qty: 60 TABLET | Refills: 0 | Status: SHIPPED | OUTPATIENT
Start: 2017-10-20 | End: 2017-11-14

## 2017-11-01 DIAGNOSIS — F41.1 GAD (GENERALIZED ANXIETY DISORDER): ICD-10-CM

## 2017-11-01 RX ORDER — ALPRAZOLAM 0.5 MG
TABLET ORAL
Qty: 30 TABLET | Refills: 0 | Status: SHIPPED | OUTPATIENT
Start: 2017-11-01 | End: 2017-11-27

## 2017-11-01 NOTE — TELEPHONE ENCOUNTER
XANAX      Last Written Prescription Date:  10/10/17  Last Fill Quantity: 30,   # refills: 0  Future Office visit:       Routing refill request to provider for review/approval because:  Drug not on the [facility], [facility] or [facility] refill protocol or controlled substance

## 2017-11-02 ENCOUNTER — TRANSFERRED RECORDS (OUTPATIENT)
Dept: HEALTH INFORMATION MANAGEMENT | Facility: CLINIC | Age: 52
End: 2017-11-02

## 2017-11-14 ENCOUNTER — NURSE TRIAGE (OUTPATIENT)
Dept: NURSING | Facility: CLINIC | Age: 52
End: 2017-11-14

## 2017-11-14 DIAGNOSIS — F98.8 ATTENTION DEFICIT DISORDER, UNSPECIFIED HYPERACTIVITY PRESENCE: ICD-10-CM

## 2017-11-14 RX ORDER — METHYLPHENIDATE HYDROCHLORIDE 20 MG/1
20 TABLET ORAL 2 TIMES DAILY
Qty: 60 TABLET | Refills: 0 | Status: SHIPPED | OUTPATIENT
Start: 2017-11-14 | End: 2017-12-12

## 2017-11-15 DIAGNOSIS — E78.2 MIXED HYPERLIPIDEMIA: ICD-10-CM

## 2017-11-15 RX ORDER — SIMVASTATIN 20 MG
20 TABLET ORAL AT BEDTIME
Qty: 90 TABLET | Refills: 2 | Status: SHIPPED | OUTPATIENT
Start: 2017-11-15 | End: 2019-05-29

## 2017-11-15 NOTE — TELEPHONE ENCOUNTER
Simvastatin  Prescription approved per [facility] [place].

## 2017-11-27 DIAGNOSIS — F41.1 GAD (GENERALIZED ANXIETY DISORDER): ICD-10-CM

## 2017-11-27 RX ORDER — ALPRAZOLAM 0.5 MG
TABLET ORAL
Qty: 30 TABLET | Refills: 0 | Status: SHIPPED | OUTPATIENT
Start: 2017-11-27 | End: 2017-12-22

## 2017-11-27 NOTE — TELEPHONE ENCOUNTER
Xanax      Last Office Visit: 09/21/17  Last Written Prescription Date:  11/01/17  Last Fill Quantity: 30,   # refills: 0  Future Office visit:       Routing refill request to provider for review/approval because:  Drug not on the [facility], [facility] or [facility] refill protocol or controlled substance

## 2017-12-12 DIAGNOSIS — F98.8 ATTENTION DEFICIT DISORDER, UNSPECIFIED HYPERACTIVITY PRESENCE: ICD-10-CM

## 2017-12-12 RX ORDER — METHYLPHENIDATE HYDROCHLORIDE 20 MG/1
20 TABLET ORAL 2 TIMES DAILY
Qty: 60 TABLET | Refills: 0 | Status: SHIPPED | OUTPATIENT
Start: 2017-12-12 | End: 2018-01-10

## 2017-12-12 NOTE — TELEPHONE ENCOUNTER
ritalin    Please walk downstairs when done. Call patient at [phone]  Last Written Prescription Date:  11/14/17  Last Fill Quantity: 60,   # refills: 0  Last Office Visit: future OV 1/3/17  Future Office visit:    Next 5 appointments (look out 90 days)     Jan 03, 2018  4:20 PM CST   SHORT with New Castellanos MD   [facility]    [address]  [facility] [id number] [phone]                   Routing refill request to provider for review/approval because:  Drug not on the [facility], [facility] or [facility] refill protocol or controlled substance

## 2017-12-22 DIAGNOSIS — F41.1 GAD (GENERALIZED ANXIETY DISORDER): ICD-10-CM

## 2017-12-26 RX ORDER — ALPRAZOLAM 0.5 MG
TABLET ORAL
Qty: 30 TABLET | Refills: 0 | Status: SHIPPED | OUTPATIENT
Start: 2017-12-26 | End: 2018-01-21

## 2018-01-03 ENCOUNTER — OFFICE VISIT (OUTPATIENT)
Dept: INTERNAL MEDICINE | Facility: CLINIC | Age: 53
End: 2018-01-03
Payer: COMMERCIAL

## 2018-01-03 VITALS
HEART RATE: 108 BPM | HEIGHT: 65 IN | SYSTOLIC BLOOD PRESSURE: 106 MMHG | TEMPERATURE: 98.7 F | WEIGHT: 170 LBS | OXYGEN SATURATION: 97 % | BODY MASS INDEX: 28.32 KG/M2 | DIASTOLIC BLOOD PRESSURE: 74 MMHG

## 2018-01-03 DIAGNOSIS — E78.2 MIXED HYPERLIPIDEMIA: ICD-10-CM

## 2018-01-03 DIAGNOSIS — Z00.00 ROUTINE ADULT HEALTH MAINTENANCE: ICD-10-CM

## 2018-01-03 DIAGNOSIS — J30.2 CHRONIC SEASONAL ALLERGIC RHINITIS, UNSPECIFIED TRIGGER: ICD-10-CM

## 2018-01-03 DIAGNOSIS — F41.1 GAD (GENERALIZED ANXIETY DISORDER): ICD-10-CM

## 2018-01-03 DIAGNOSIS — Z12.11 SPECIAL SCREENING FOR MALIGNANT NEOPLASMS, COLON: ICD-10-CM

## 2018-01-03 DIAGNOSIS — F98.8 ATTENTION DEFICIT DISORDER, UNSPECIFIED HYPERACTIVITY PRESENCE: ICD-10-CM

## 2018-01-03 DIAGNOSIS — I10 ESSENTIAL HYPERTENSION, BENIGN: Primary | ICD-10-CM

## 2018-01-03 PROCEDURE — 99214 OFFICE O/P EST MOD 30 MIN: CPT | Performed by: INTERNAL MEDICINE

## 2018-01-03 RX ORDER — CETIRIZINE HYDROCHLORIDE 10 MG/1
10 TABLET ORAL EVERY EVENING
Qty: 90 TABLET | Refills: 3 | Status: SHIPPED | OUTPATIENT
Start: 2018-01-03 | End: 2020-03-22

## 2018-01-03 ASSESSMENT — ANXIETY QUESTIONNAIRES
GAD7 TOTAL SCORE: 3
2. NOT BEING ABLE TO STOP OR CONTROL WORRYING: NOT AT ALL
4. TROUBLE RELAXING: SEVERAL DAYS
7. FEELING AFRAID AS IF SOMETHING AWFUL MIGHT HAPPEN: NOT AT ALL
6. BECOMING EASILY ANNOYED OR IRRITABLE: NOT AT ALL
3. WORRYING TOO MUCH ABOUT DIFFERENT THINGS: NOT AT ALL
GAD7 TOTAL SCORE: 3
1. FEELING NERVOUS, ANXIOUS, OR ON EDGE: SEVERAL DAYS
5. BEING SO RESTLESS THAT IT IS HARD TO SIT STILL: SEVERAL DAYS
GAD7 TOTAL SCORE: 3
7. FEELING AFRAID AS IF SOMETHING AWFUL MIGHT HAPPEN: NOT AT ALL

## 2018-01-03 ASSESSMENT — PATIENT HEALTH QUESTIONNAIRE - PHQ9
10. IF YOU CHECKED OFF ANY PROBLEMS, HOW DIFFICULT HAVE THESE PROBLEMS MADE IT FOR YOU TO DO YOUR WORK, TAKE CARE OF THINGS AT HOME, OR GET ALONG WITH OTHER PEOPLE: NOT DIFFICULT AT ALL
SUM OF ALL RESPONSES TO PHQ QUESTIONS 1-9: 3
SUM OF ALL RESPONSES TO PHQ QUESTIONS 1-9: 3

## 2018-01-03 NOTE — NURSING NOTE
"Chief Complaint   Patient presents with     Recheck Medication       Initial /74 (BP Location: Left arm, Patient Position: Sitting, Cuff Size: Adult Large)  Pulse 108  Temp 98.7  F (37.1  C) (Oral)  Ht 5' 5\" (1.651 m)  Wt 170 lb (77.1 kg)  SpO2 97%  BMI 28.29 kg/m2 Estimated body mass index is 28.29 kg/(m^2) as calculated from the following:    Height as of this encounter: 5' 5\" (1.651 m).    Weight as of this encounter: 170 lb (77.1 kg).  Medication Reconciliation: complete    "

## 2018-01-03 NOTE — PROGRESS NOTES
Answers for HPI/ROS submitted by the patient on 1/3/2018   Chronic problems general questions HPI Form  Diet:: Low fat/cholesterol  Frequency of exercise:: 2-3 days/week  Taking medications regularly:: Yes  Medication side effects:: None  Additional concerns today:: No  PHQ-2 Score: 1  Duration of exercise:: 15-30 minutes  If you checked off any problems, how difficult have these problems made it for you to do your work, take care of things at home, or get along with other people?: Not difficult at all  PHQ9 TOTAL SCORE: 3  REBEKAH 7 TOTAL SCORE: 3  .  SUBJECTIVE:   Jayleen Lang is a 52 year old female who presents to clinic today for the following health issues:      Patient is seen for a follow up visit.  No acute complaints, no medication change or new medical conditions.    Has h/o HTN. on medical treatment. BP has been controlled. No side effects from medications. No CP, HA, dizziness. good compliance with medications and low salt diet.  Has H/O hyperlipidemia. On medical treatment and diet. No side effects. No muscle weakness, myalgias or upset stomach.   Has h/o anxiety, ADHD. On medical treatment. Helping with symptoms . Denies side effects.     Preventive measures reviewed.       Amount of exercise or physical activity: 2-3 days/week for an average of 15-30 minutes    Problems taking medications regularly: No    Medication side effects: none    Diet: low salt            Problem list and histories reviewed & adjusted, as indicated.  Additional history: as documented    Patient Active Problem List   Diagnosis     CARDIOVASCULAR SCREENING; LDL GOAL LESS THAN 160     Rash     Menorrhagia     Obesity     REBEKAH (generalized anxiety disorder)     Essential hypertension, benign     Allergic reaction     Mixed hyperlipidemia     Chronic neck pain     Controlled substance agreement signed     Attention deficit disorder, unspecified hyperactivity presence     Past Surgical History:   Procedure Laterality Date     ARTHROSCOPIC  REPAIR POSTERIOR CRUCIATE LIGAMENT        DELIVERY ONLY  10/1998     Foot surgery - bone spurs  2002     LAMINECT/DISCECTOMY, CERVICAL      C6-C7 Fusion.       Social History   Substance Use Topics     Smoking status: Never Smoker     Smokeless tobacco: Never Used     Alcohol use Yes      Comment: 5 beers a week     Family History   Problem Relation Age of Onset     CANCER Mother      lung     HEART DISEASE Father      congestive heart failure         Current Outpatient Prescriptions   Medication Sig Dispense Refill     cetirizine (ZYRTEC) 10 MG tablet Take 1 tablet (10 mg) by mouth every evening 90 tablet 3     ALPRAZolam (XANAX) 0.5 MG tablet TAKE ONE TABLET BY MOUTH DAILY AS NEEDED FOR ANXIETY 30 tablet 0     methylphenidate (RITALIN) 20 MG tablet Take 1 tablet (20 mg) by mouth 2 times daily 60 tablet 0     simvastatin (ZOCOR) 20 MG tablet Take 1 tablet (20 mg) by mouth At Bedtime 90 tablet 2     gabapentin (NEURONTIN) 600 MG tablet TAKE ONE TABLET BY MOUTH 3 TIMES DAILY. 90 tablet 4     hydrochlorothiazide (MICROZIDE) 12.5 MG capsule Take 1 capsule (12.5 mg) by mouth daily 90 capsule 3     lisinopril (PRINIVIL/ZESTRIL) 20 MG tablet TAKE ONE TABLET BY MOUTH DAILY 90 tablet 3     fluticasone (FLONASE) 50 MCG/ACT nasal spray Spray 1-2 sprays into both nostrils daily (Patient taking differently: Spray 1-2 sprays into both nostrils daily as needed ) 48 g 3     mometasone (ELOCON) 0.1 % cream Apply sparingly to affected area twice daily as needed.  Do not apply to face. 45 g 0     EPINEPHrine (EPIPEN/ADRENACLICK/OR ANY BX GENERIC EQUIV) 0.3 MG/0.3ML injection 2-pack Inject 0.3 mLs (0.3 mg) into the muscle once as needed for anaphylaxis (Patient not taking: Reported on 1/3/2018) 0.3 mL 1         Reviewed and updated as needed this visit by clinical staffTobacco  Allergies  Meds  Med Hx  Surg Hx  Fam Hx  Soc Hx      Reviewed and updated as needed this visit by Provider         ROS:  Constitutional,  "HEENT, cardiovascular, pulmonary, gi and gu systems are negative, except as otherwise noted.      OBJECTIVE:   /74 (BP Location: Left arm, Patient Position: Sitting, Cuff Size: Adult Large)  Pulse 108  Temp 98.7  F (37.1  C) (Oral)  Ht 5' 5\" (1.651 m)  Wt 170 lb (77.1 kg)  SpO2 97%  BMI 28.29 kg/m2  Body mass index is 28.29 kg/(m^2).   GENERAL: healthy, alert and no distress  NECK: no adenopathy, no asymmetry, masses, or scars and thyroid normal to palpation  RESP: lungs clear to auscultation - no rales, rhonchi or wheezes  CV: regular rate and rhythm, normal S1 S2, no S3 or S4, no murmur, click or rub, no peripheral edema and peripheral pulses strong  ABDOMEN: soft, nontender, no hepatosplenomegaly, no masses and bowel sounds normal  MS: no gross musculoskeletal defects noted, no edema    Diagnostic Test Results:  none     ASSESSMENT/PLAN:     Problem List Items Addressed This Visit     REBEKAH (generalized anxiety disorder)    Essential hypertension, benign - Primary    Mixed hyperlipidemia    Attention deficit disorder, unspecified hyperactivity presence      Other Visit Diagnoses     Special screening for malignant neoplasms, colon        Relevant Orders    GASTROENTEROLOGY ADULT REF PROCEDURE ONLY    Chronic seasonal allergic rhinitis, unspecified trigger        Relevant Medications    cetirizine (ZYRTEC) 10 MG tablet           Controlled HTN , cont treatment   Controlled lipids, cont Statin   Reviewed medications side effects, cont Ritalin, anxiolytic   Immunized   Refer for colonoscopy     Follow-Up:in 6 months     New Castellanos MD  [facility]    "

## 2018-01-03 NOTE — MR AVS SNAPSHOT
After Visit Summary   1/3/2018    Jayleen Lang    MRN: [id number]           Patient Information     Date Of Birth          1965        Visit Information        Provider Department      1/3/2018 4:20 PM New Castellanos MD [facility]        Today's Diagnoses     Essential hypertension, benign    -  1    Special screening for malignant neoplasms, colon        Chronic seasonal allergic rhinitis, unspecified trigger        REBEKAH (generalized anxiety disorder)        Mixed hyperlipidemia        Attention deficit disorder, unspecified hyperactivity presence           Follow-ups after your visit        Additional Services     GASTROENTEROLOGY ADULT REF PROCEDURE ONLY       Last Lab Result: Creatinine (mg/dL)       Date                     Value                 09/21/2017               0.92             ----------  Body mass index is 28.29 kg/(m^2).     Needed:  No  Language:  English    Patient will be contacted to schedule procedure.     Please be aware that coverage of these services is subject to the terms and limitations of your health insurance plan.  Call member services at your health plan with any benefit or coverage questions.  Any procedures must be performed at a [place] facility OR coordinated by your clinic's referral office.    Please bring the following with you to your appointment:    (1) Any X-Rays, CTs or MRIs which have been performed.  Contact the facility where they were done to arrange for  prior to your scheduled appointment.    (2) List of current medications   (3) This referral request   (4) Any documents/labs given to you for this referral                  Who to contact     If you have questions or need follow up information about today's clinic visit or your schedule please contact [facility] directly at [phone].  Normal or non-critical lab and imaging results will be communicated to you by MyChart, letter or phone within  "4 business days after the clinic has received the results. If you do not hear from us within 7 days, please contact the clinic through [employer] or phone. If you have a critical or abnormal lab result, we will notify you by phone as soon as possible.  Submit refill requests through [employer] or call your pharmacy and they will forward the refill request to us. Please allow 3 business days for your refill to be completed.          Additional Information About Your Visit        Fonemesh Information     [employer] lets you send messages to your doctor, view your test results, renew your prescriptions, schedule appointments and more. To sign up, go to [email or web address] . Click on \"Log in\" on the left side of the screen, which will take you to the Welcome page. Then click on \"Sign up Now\" on the right side of the page.     You will be asked to enter the access code listed below, as well as some personal information. Please follow the directions to create your username and password.     Your access code is: [email or web address]  Expires: 4/3/2018  4:54 PM     Your access code will  in 90 days. If you need help or a new code, please call your [facility] clinic or [phone].        Care EveryWhere ID     This is your Care EveryWhere ID. This could be used by other organizations to access your [facility] medical records  [id number]        Your Vitals Were     Pulse Temperature Height Pulse Oximetry BMI (Body Mass Index)       108 98.7  F (37.1  C) (Oral) 5' 5\" (1.651 m) 97% 28.29 kg/m2        Blood Pressure from Last 3 Encounters:   18 106/74   17 110/80   17 100/70    Weight from Last 3 Encounters:   18 170 lb (77.1 kg)   17 175 lb (79.4 kg)   17 170 lb (77.1 kg)              We Performed the Following     GASTROENTEROLOGY ADULT REF PROCEDURE ONLY          Today's Medication Changes          These changes are accurate as of: 1/3/18  4:54 PM.  If you have any questions, ask your nurse or " doctor.               These medicines have changed or have updated prescriptions.        Dose/Directions    fluticasone 50 MCG/ACT spray   Commonly known as:  FLONASE   This may have changed:    - when to take this  - reasons to take this   Used for:  Pollen allergies        Dose:  1-2 spray   Spray 1-2 sprays into both nostrils daily   Quantity:  48 g   Refills:  3            Where to get your medicines      These medications were sent to [id number] - [place], [place] - [address]  [address], [facility] [address]     Phone:  [phone]     cetirizine 10 MG tablet                Primary Care Provider Office Phone # Fax #    New Castellanos [phone]       [address]  [facility] [address]        Equal Access to Services     BOUCHRA OSORIO : Hadii yinka flaherty hadasho Soomaali, waaxda luqadaha, qaybta kaalmada adeegyada, mendez martin . So [facility] [phone].    ATENCIÓN: Si habla español, tiene a villagomez disposición servicios gratuitos de asistencia lingüística. Ll[facility] [phone].    We comply with applicable federal civil rights laws and [place] laws. We do not discriminate on the basis of race, color, national origin, age, disability, sex, sexual orientation, or gender identity.            Thank you!     Thank you for choosing [facility]  for your care. Our goal is always to provide you with excellent care. Hearing back from our patients is one way we can continue to improve our services. Please take a few minutes to complete the written survey that you may receive in the mail after your visit with us. Thank you!             Your Updated Medication List - Protect others around you: Learn how to safely use, store and throw away your medicines at www.disposemymeds.org.          This list is accurate as of: 1/3/18  4:54 PM.  Always use your most recent med list.                   Brand Name Dispense Instructions for use Diagnosis     ALPRAZolam 0.5 MG tablet    XANAX    30 tablet    TAKE ONE TABLET BY MOUTH DAILY AS NEEDED FOR ANXIETY    REBEKAH (generalized anxiety disorder)       cetirizine 10 MG tablet    zyrTEC    90 tablet    Take 1 tablet (10 mg) by mouth every evening    Chronic seasonal allergic rhinitis, unspecified trigger       EPINEPHrine 0.3 MG/0.3ML injection 2-pack    EPIPEN/ADRENACLICK/or ANY BX GENERIC EQUIV    0.3 mL    Inject 0.3 mLs (0.3 mg) into the muscle once as needed for anaphylaxis    History of bee sting allergy       fluticasone 50 MCG/ACT spray    FLONASE    48 g    Spray 1-2 sprays into both nostrils daily    Pollen allergies       gabapentin 600 MG tablet    NEURONTIN    90 tablet    TAKE ONE TABLET BY MOUTH 3 TIMES DAILY.    Neuropathy       hydrochlorothiazide 12.5 MG capsule    MICROZIDE    90 capsule    Take 1 capsule (12.5 mg) by mouth daily    Essential hypertension       lisinopril 20 MG tablet    PRINIVIL/ZESTRIL    90 tablet    TAKE ONE TABLET BY MOUTH DAILY    Essential hypertension       methylphenidate 20 MG tablet    RITALIN    60 tablet    Take 1 tablet (20 mg) by mouth 2 times daily    Attention deficit disorder, unspecified hyperactivity presence       mometasone 0.1 % cream    ELOCON    45 g    Apply sparingly to affected area twice daily as needed.  Do not apply to face.    Angular cheilitis       simvastatin 20 MG tablet    ZOCOR    90 tablet    Take 1 tablet (20 mg) by mouth At Bedtime    Mixed hyperlipidemia

## 2018-01-04 PROCEDURE — 90714 TD VACC NO PRESV 7 YRS+ IM: CPT | Performed by: INTERNAL MEDICINE

## 2018-01-04 PROCEDURE — 90471 IMMUNIZATION ADMIN: CPT | Performed by: INTERNAL MEDICINE

## 2018-01-04 ASSESSMENT — ANXIETY QUESTIONNAIRES: GAD7 TOTAL SCORE: 3

## 2018-01-04 ASSESSMENT — PATIENT HEALTH QUESTIONNAIRE - PHQ9: SUM OF ALL RESPONSES TO PHQ QUESTIONS 1-9: 3

## 2018-01-10 ENCOUNTER — TELEPHONE (OUTPATIENT)
Dept: INTERNAL MEDICINE | Facility: CLINIC | Age: 53
End: 2018-01-10

## 2018-01-10 DIAGNOSIS — F98.8 ATTENTION DEFICIT DISORDER, UNSPECIFIED HYPERACTIVITY PRESENCE: ICD-10-CM

## 2018-01-10 RX ORDER — METHYLPHENIDATE HYDROCHLORIDE 20 MG/1
20 TABLET ORAL 2 TIMES DAILY
Qty: 60 TABLET | Refills: 0 | Status: SHIPPED | OUTPATIENT
Start: 2018-01-10 | End: 2018-02-08

## 2018-01-10 NOTE — TELEPHONE ENCOUNTER
Reason for Call:  Medication or medication refill:    Do you use a [facility]?  Name of the pharmacy and phone number for the current request[facility] [place] [facility] - [phone]    Name of the medication requested: ritalin    Other request: none    Can we leave a detailed message on this number? YES    Phone number patient can be reached at: Home number on file [phone] (home)    Best Time: any    Call taken on 1/10/2018 at 8:07 AM by Keturah Zamorano

## 2018-01-10 NOTE — TELEPHONE ENCOUNTER
Attempted to contact patient, no answer, left detailed voice message as instructed by patient with provider message from below and informed to call back with questions.  Rx hand carried to [facility]

## 2018-01-10 NOTE — TELEPHONE ENCOUNTER
Signed CSA form in chart.    [facility].      Ritalin      Last Written Prescription Date:  12/12/17  Last Fill Quantity: 60,   # refills: 0  Last Office Visit: 1/3/18  Future Office visit:       Routing refill request to provider for review/approval because:  Drug not on the [facility], P or [facility] refill protocol or controlled substance.

## 2018-01-21 DIAGNOSIS — F41.1 GAD (GENERALIZED ANXIETY DISORDER): ICD-10-CM

## 2018-01-24 RX ORDER — ALPRAZOLAM 0.5 MG
TABLET ORAL
Qty: 30 TABLET | Refills: 0 | Status: SHIPPED | OUTPATIENT
Start: 2018-01-24 | End: 2018-02-21

## 2018-01-24 NOTE — TELEPHONE ENCOUNTER
Alprazolam      Last Written Prescription Date:  12-26-17  Last Fill Quantity: 30,   # refills: 0  Last Office Visit: 1-3-18  Future Office visit:       Routing refill request to provider for review/approval because:  Drug not on the [facility], P or [facility] refill protocol or controlled substance    Signed CSA form in chart.    RX monitoring program ([place]) reviewed: access not granted by provider.    [place] profile:  [email or web address]    Please advise, thanks.

## 2018-01-25 NOTE — TELEPHONE ENCOUNTER
Rx (Alprazolam) was faxed to [facility] in [place] at [phone].

## 2018-02-08 DIAGNOSIS — F98.8 ATTENTION DEFICIT DISORDER, UNSPECIFIED HYPERACTIVITY PRESENCE: ICD-10-CM

## 2018-02-08 RX ORDER — METHYLPHENIDATE HYDROCHLORIDE 20 MG/1
20 TABLET ORAL 2 TIMES DAILY
Qty: 60 TABLET | Refills: 0 | Status: SHIPPED | OUTPATIENT
Start: 2018-02-08 | End: 2018-03-08

## 2018-02-08 NOTE — TELEPHONE ENCOUNTER
Rx brought down to  pharmacy- pt advised to call pharmacy to make sure it has been process and ready for  before coming in.    Left detail message on voicemail.

## 2018-02-08 NOTE — TELEPHONE ENCOUNTER
"Pt calling for refill on Ritalin.  [facility].  She states she is leaving town tomorrow \"for a while\".    Ritalin      Last Written Prescription Date:  1-10-18  Last Fill Quantity: 60,   # refills: 0  Last Office Visit: 1-3-18  Future Office visit:       Routing refill request to provider for review/approval because:  Drug not on the [facility], P or  Health refill protocol or controlled substance    Signed CSA form in chart.    RX monitoring program ([place]) reviewed: access not granted by provider.    [place]MP profile:  [email or web address]    Please advise, thanks.    "

## 2018-02-08 NOTE — TELEPHONE ENCOUNTER
Remind her that she needs to give at least 3 business days notice on refills of controlled substances. Her refill is due tomorrow so I will refill it now, her next refill will be due on 3/11. She has been getting her refills a little early, needs to last 30 days.

## 2018-02-21 DIAGNOSIS — F41.1 GAD (GENERALIZED ANXIETY DISORDER): ICD-10-CM

## 2018-02-22 NOTE — TELEPHONE ENCOUNTER
Alprazolam      Last Written Prescription Date:  1-24-18  Last Fill Quantity: 30,   # refills: 0  Last Office Visit: 1-3-18  Future Office visit:       Routing refill request to provider for review/approval because:  Drug not on the [facility], P or [facility] refill protocol or controlled substance    Signed CSA form in chart.    RX monitoring program ([place]) reviewed: access not granted by provider.    [place]MP profile:  [email or web address]    Please advise, thanks.

## 2018-02-23 RX ORDER — ALPRAZOLAM 0.5 MG
TABLET ORAL
Qty: 30 TABLET | Refills: 0 | Status: SHIPPED | OUTPATIENT
Start: 2018-02-23 | End: 2018-03-18

## 2018-03-08 DIAGNOSIS — F98.8 ATTENTION DEFICIT DISORDER, UNSPECIFIED HYPERACTIVITY PRESENCE: ICD-10-CM

## 2018-03-08 NOTE — TELEPHONE ENCOUNTER
Patient is out of her Ritalin, wants to know if partner can fill today as PCP is not in office today.  Wants rx taken down to [facility] and to be called once this has been done.    Methylphenidate 20 mg       Last Written Prescription Date:  2/8/18  Last Fill Quantity: 60,   # refills: 0  Last Office Visit: 1/3/18  Future Office visit:       Routing refill request to provider for review/approval because:  Drug not on the [facility], P or [facility] refill protocol or controlled substance  CSA on file, signed by Dr. Green but none with Dr. Castellanos  RX monitoring program ([place]MP) reviewed: Unable to review  as no access granted under this provider.     [place]MP profile:  [email or web address]

## 2018-03-08 NOTE — TELEPHONE ENCOUNTER
This should not be out; 30 tablets done on 2/8 should last until 2/10. I can not fill early. Hold for primary tomorrow. Advise in the future she needs to remember to call 3 business days before needs to  and remind her that the CSA states it will not be filled early for any reason.

## 2018-03-09 RX ORDER — METHYLPHENIDATE HYDROCHLORIDE 20 MG/1
20 TABLET ORAL 2 TIMES DAILY
Qty: 60 TABLET | Refills: 0 | Status: SHIPPED | OUTPATIENT
Start: 2018-03-09 | End: 2018-04-09

## 2018-03-09 RX ORDER — METHYLPHENIDATE HYDROCHLORIDE 20 MG/1
20 TABLET ORAL 2 TIMES DAILY
Qty: 60 TABLET | Refills: 0 | Status: SHIPPED | OUTPATIENT
Start: 2018-03-09 | End: 2018-03-09

## 2018-03-09 NOTE — TELEPHONE ENCOUNTER
Patient calling checking on status of request. Unable to locate Rx and script not taken to pharmacy. Could PCP rewrite RX for patient please.   Provider please review and advise. Thank you.

## 2018-03-09 NOTE — TELEPHONE ENCOUNTER
Patient calling again checking on status of request.  Provider please review and advise. Thank you.

## 2018-03-18 DIAGNOSIS — F41.1 GAD (GENERALIZED ANXIETY DISORDER): ICD-10-CM

## 2018-03-19 RX ORDER — ALPRAZOLAM 0.5 MG
TABLET ORAL
Qty: 30 TABLET | Refills: 0 | Status: SHIPPED | OUTPATIENT
Start: 2018-03-19 | End: 2018-04-16

## 2018-04-09 DIAGNOSIS — F98.8 ATTENTION DEFICIT DISORDER, UNSPECIFIED HYPERACTIVITY PRESENCE: ICD-10-CM

## 2018-04-09 NOTE — TELEPHONE ENCOUNTER
Reason for Call:  Medication or medication refill:    Do you use a [facility]? Yes     Name of the pharmacy and phone number for the current request: [facility]    Name of the medication requested: ritalin     Other request: none    Can we leave a detailed message on this number? YES    Phone number patient can be reached at: Cell number on file:    Telephone Information:   Mobile [phone]       Best Time: asap    Call taken on 4/9/2018 at 1:35 PM by Johanne Luong

## 2018-04-09 NOTE — TELEPHONE ENCOUNTER
[facility]    Ritalin      Last Written Prescription Date:  3-9-18  Last Fill Quantity: 60,   # refills: 0  Last Office Visit: 1-3-18  Future Office visit:       Routing refill request to provider for review/approval because:  Drug not on the [facility], P or [facility] refill protocol or controlled substance    Signed CSA form in chart.    RX monitoring program ([place]MP) reviewed: access not granted by provider.    M[place]MP profile:  [email or web address]    Please advise, thanks.

## 2018-04-10 RX ORDER — METHYLPHENIDATE HYDROCHLORIDE 20 MG/1
20 TABLET ORAL 2 TIMES DAILY
Qty: 60 TABLET | Refills: 0 | Status: SHIPPED | OUTPATIENT
Start: 2018-04-10 | End: 2018-05-08

## 2018-04-12 ENCOUNTER — TRANSFERRED RECORDS (OUTPATIENT)
Dept: HEALTH INFORMATION MANAGEMENT | Facility: CLINIC | Age: 53
End: 2018-04-12

## 2018-04-16 DIAGNOSIS — F41.1 GAD (GENERALIZED ANXIETY DISORDER): ICD-10-CM

## 2018-04-16 RX ORDER — ALPRAZOLAM 0.5 MG
TABLET ORAL
Qty: 30 TABLET | Refills: 0 | Status: SHIPPED | OUTPATIENT
Start: 2018-04-16 | End: 2018-05-15

## 2018-05-07 DIAGNOSIS — F98.8 ATTENTION DEFICIT DISORDER, UNSPECIFIED HYPERACTIVITY PRESENCE: ICD-10-CM

## 2018-05-07 NOTE — TELEPHONE ENCOUNTER
Reason for Call:  Medication or medication refill:Medication    Do you use a [facility]?  Name of the [facility] and phone number for the current request[facility] [address] ([place]) - [phone]    Name of the medication requested: methylphenidate (RITALIN) 20 MG tablet    Other request: Needs next refill    Can we leave a detailed message on this number? YES    Phone number patient can be reached at: Cell number on file:    Telephone Information:   Mobile [phone]       Best Time: anytime    Call taken on 5/7/2018 at 4:11 PM by CADEN PALACIOS

## 2018-05-08 RX ORDER — METHYLPHENIDATE HYDROCHLORIDE 20 MG/1
20 TABLET ORAL 2 TIMES DAILY
Qty: 60 TABLET | Refills: 0 | Status: SHIPPED | OUTPATIENT
Start: 2018-05-08 | End: 2018-06-07

## 2018-05-08 NOTE — TELEPHONE ENCOUNTER
Ritalin TAKE to  pharmacy        Last Written Prescription Date:  4/10/18  Last Fill Quantity: 60,   # refills: 0    Last Office Visit: 1/3/18  Future Office visit:    Next 5 appointments (look out 90 days)     May 29, 2018  5:00 PM CDT   Pre-Op physical with New Castellanos MD   [facility]    [address]  [facility] [id number] [phone]                   Routing refill request to provider for review/approval because:  Drug not on the [facility], [facility] or [place] refill protocol or controlled substance

## 2018-05-15 DIAGNOSIS — F41.1 GAD (GENERALIZED ANXIETY DISORDER): ICD-10-CM

## 2018-05-15 NOTE — TELEPHONE ENCOUNTER
Requested Prescriptions   Pending Prescriptions Disp Refills     ALPRAZolam (XANAX) 0.5 MG tablet [Pharmacy Med Name: ALPRAZolam Oral Tablet 0.5 MG] 30 tablet 0     Sig: TAKE 1 TABLET BY MOUTH DAILY AS NEEDED FOR ANXIETY    There is no refill protocol information for this order            Last Written Prescription Date:  4/16/18  Last Fill Quantity: 30,   # refills: 0  Last Office Visit: 1/3/18  Future Office visit:    Next 5 appointments (look out 90 days)     May 29, 2018  5:00 PM CDT   Pre-Op physical with New Castellanos MD   [facility]    [address]  [facility] [id number] [phone]                   Routing refill request to provider for review/approval because:  Drug not on the [facility], [facility] or  Health refill protocol or controlled substance    RX monitoring program ([place]NPMP) reviewed:  not reviewed -  provider has not granted access.     MNPMP profile:  [email or web address]

## 2018-05-16 RX ORDER — ALPRAZOLAM 0.5 MG
TABLET ORAL
Qty: 30 TABLET | Refills: 0 | Status: SHIPPED | OUTPATIENT
Start: 2018-05-16 | End: 2018-06-12

## 2018-05-24 DIAGNOSIS — G62.9 NEUROPATHY: ICD-10-CM

## 2018-05-24 NOTE — TELEPHONE ENCOUNTER
neurontin      Last Written Prescription Date:  9/15/17  Last Fill Quantity: 90,   # refills: 4  Last Office Visit: 1/3/18  Future Office visit:    Next 5 appointments (look out 90 days)     May 29, 2018  5:00 PM CDT   Pre-Op physical with New Castellanos MD   [facility]    [address]  [facility] [address]   [phone]                   Routing refill request to provider for review/approval because:  Drug not on the [facility], [facility] or [facility] refill protocol or controlled substance  Marla Green RN

## 2018-05-25 RX ORDER — GABAPENTIN 600 MG/1
TABLET ORAL
Qty: 90 TABLET | Refills: 3 | Status: SHIPPED | OUTPATIENT
Start: 2018-05-25 | End: 2018-11-08

## 2018-05-29 DIAGNOSIS — F41.1 GAD (GENERALIZED ANXIETY DISORDER): ICD-10-CM

## 2018-05-30 RX ORDER — ALPRAZOLAM 0.5 MG
TABLET ORAL
Qty: 30 TABLET | Refills: 0 | OUTPATIENT
Start: 2018-05-30

## 2018-06-06 ENCOUNTER — TELEPHONE (OUTPATIENT)
Dept: INTERNAL MEDICINE | Facility: CLINIC | Age: 53
End: 2018-06-06

## 2018-06-06 DIAGNOSIS — F98.8 ATTENTION DEFICIT DISORDER, UNSPECIFIED HYPERACTIVITY PRESENCE: ICD-10-CM

## 2018-06-06 NOTE — TELEPHONE ENCOUNTER
Reason for Call:  Medication or medication refill:    Do you use a [facility]?  Name of the pharmacy and phone number for the current request[facility]  [facility] - [phone]    Name of the medication requested: ritalin    Other request: none    Can we leave a detailed message on this number? YES    Phone number patient can be reached at: Home number on file [phone] (home)    Best Time: any    Call taken on 6/6/2018 at 10:46 AM by Keturah Zamorano

## 2018-06-08 RX ORDER — METHYLPHENIDATE HYDROCHLORIDE 20 MG/1
20 TABLET ORAL 2 TIMES DAILY
Qty: 60 TABLET | Refills: 0 | Status: SHIPPED | OUTPATIENT
Start: 2018-06-08 | End: 2018-07-09

## 2018-06-08 NOTE — TELEPHONE ENCOUNTER
Patient calling to see if rx is ready, will run out over weekend so asks that it be addressed ASAP.  States she called in for refill early to make sure she could get it by today.  MISBAH Flor R.N.

## 2018-06-12 ENCOUNTER — OFFICE VISIT (OUTPATIENT)
Dept: INTERNAL MEDICINE | Facility: CLINIC | Age: 53
End: 2018-06-12
Payer: COMMERCIAL

## 2018-06-12 VITALS
RESPIRATION RATE: 16 BRPM | DIASTOLIC BLOOD PRESSURE: 72 MMHG | BODY MASS INDEX: 30.39 KG/M2 | HEART RATE: 80 BPM | SYSTOLIC BLOOD PRESSURE: 104 MMHG | WEIGHT: 182.6 LBS | OXYGEN SATURATION: 96 % | TEMPERATURE: 98.3 F

## 2018-06-12 DIAGNOSIS — E78.2 MIXED HYPERLIPIDEMIA: ICD-10-CM

## 2018-06-12 DIAGNOSIS — F41.1 GAD (GENERALIZED ANXIETY DISORDER): ICD-10-CM

## 2018-06-12 DIAGNOSIS — M21.612 BUNION, LEFT: ICD-10-CM

## 2018-06-12 DIAGNOSIS — Z91.030 HISTORY OF BEE STING ALLERGY: ICD-10-CM

## 2018-06-12 DIAGNOSIS — I10 ESSENTIAL HYPERTENSION, BENIGN: ICD-10-CM

## 2018-06-12 DIAGNOSIS — Z01.818 PREOP GENERAL PHYSICAL EXAM: Primary | ICD-10-CM

## 2018-06-12 PROCEDURE — 93000 ELECTROCARDIOGRAM COMPLETE: CPT | Performed by: INTERNAL MEDICINE

## 2018-06-12 PROCEDURE — 99214 OFFICE O/P EST MOD 30 MIN: CPT | Performed by: INTERNAL MEDICINE

## 2018-06-12 RX ORDER — EPINEPHRINE 0.3 MG/.3ML
0.3 INJECTION SUBCUTANEOUS
Qty: 0.3 ML | Refills: 1 | Status: SHIPPED | OUTPATIENT
Start: 2018-06-12 | End: 2019-05-29

## 2018-06-12 NOTE — PROGRESS NOTES
[place] [address]  [facility] [address]  [phone]  Dept: [phone]    PRE-OP EVALUATION:  Today's date: 2018    Jayleen Lang (: 1965) presents for pre-operative evaluation assessment as requested by Dr. Parra.  She requires evaluation and anesthesia risk assessment prior to undergoing surgery/procedure for treatment of left bunion .      Primary Physician: New Castellanos  Type of Anesthesia Anticipated: to be determined    Patient has a Health Care Directive or Living Will:  NO    Preop Questions 2018   Who is doing your surgery? Dr Parra   What are you having done? L bunionectomy   Date of Surgery/Procedure: 2017 at 7:30 am   Facility or Hospital where procedure/surgery will be performed: [facility]   1.  Do you have a history of Heart attack, stroke, stent, coronary bypass surgery, or other heart surgery? No   2.  Do you ever have any pain or discomfort in your chest? No   3.  Do you have a history of  Heart Failure? No   4.   Are you troubled by shortness of breath when:  walking on a level surface, or up a slight hill, or at night? No   5.  Do you currently have a cold, bronchitis or other respiratory infection? No   6.  Do you have a cough, shortness of breath, or wheezing? No   7.  Do you sometimes get pains in the calves of your legs when you walk? No   8. Do you or anyone in your family have previous history of blood clots? No   9.  Do you or does anyone in your family have a serious bleeding problem such as prolonged bleeding following surgeries or cuts? No   10. Have you ever had problems with anemia or been told to take iron pills? No   11. Have you had any abnormal blood loss such as black, tarry or bloody stools, or abnormal vaginal bleeding? No   12. Have you ever had a blood transfusion? No   13. Have you or any of your relatives ever had problems with anesthesia? No   14. Do you have sleep apnea, excessive snoring or daytime drowsiness? No    15. Do you have any prosthetic heart valves? No   16. Do you have prosthetic joints? No   17. Is there any chance that you may be pregnant? No         HPI:     HPI related to upcoming procedure: she has large bunion of the left foot that is causing her significant pain.  She will undergo excision on .      See problem list for active medical problems.  Problems all longstanding and stable, except as noted/documented.  See ROS for pertinent symptoms related to these conditions.                                                                                                                                                          .    MEDICAL HISTORY:     Patient Active Problem List    Diagnosis Date Noted     Controlled substance agreement signed 2016     Priority: Medium     Chronic neck pain 11/15/2016     Priority: Medium     Essential hypertension, benign 2016     Priority: Medium     Allergic reaction 2016     Priority: Medium     To [place]  Airway closure       Mixed hyperlipidemia 2016     Priority: Medium     REBEKAH (generalized anxiety disorder) 2014     Priority: Medium     Attention deficit disorder, unspecified hyperactivity presence 08/15/2013     Priority: Medium     Obesity 2013     Priority: Medium     Menorrhagia 2012     Priority: Medium     Rash 2011     Priority: Medium     CARDIOVASCULAR SCREENING; LDL GOAL LESS THAN 160 10/31/2010     Priority: Medium      Past Medical History:   Diagnosis Date     ADD (attention deficit disorder)      Obesity      Past Surgical History:   Procedure Laterality Date     ARTHROPLASTY HIP  2017    left hip replaced     ARTHROSCOPIC REPAIR POSTERIOR CRUCIATE LIGAMENT        DELIVERY ONLY  10/1998     COLONOSCOPY  06/15/2018    Dr. So [facility]     Foot surgery - bone spurs  2002     GYN SURGERY       x 2     LAMINECT/DISCECTOMY, CERVICAL      C6-C7 Fusion.     Current Outpatient Prescriptions    Medication Sig Dispense Refill     ALPRAZolam (XANAX) 0.5 MG tablet TAKE 1 TABLET BY MOUTH DAILY AS NEEDED FOR ANXIETY 30 tablet 0     cetirizine (ZYRTEC) 10 MG tablet Take 1 tablet (10 mg) by mouth every evening 90 tablet 3     EPINEPHrine (EPIPEN/ADRENACLICK/OR ANY BX GENERIC EQUIV) 0.3 MG/0.3ML injection 2-pack Inject 0.3 mLs (0.3 mg) into the muscle once as needed for anaphylaxis (Patient not taking: Reported on 1/3/2018) 0.3 mL 1     fluticasone (FLONASE) 50 MCG/ACT nasal spray Spray 1-2 sprays into both nostrils daily (Patient taking differently: Spray 1-2 sprays into both nostrils daily as needed ) 48 g 3     gabapentin (NEURONTIN) 600 MG tablet TAKE ONE TABLET BY MOUTH 3 TIMES DAILY. 90 tablet 3     hydrochlorothiazide (MICROZIDE) 12.5 MG capsule Take 1 capsule (12.5 mg) by mouth daily 90 capsule 3     lisinopril (PRINIVIL/ZESTRIL) 20 MG tablet TAKE ONE TABLET BY MOUTH DAILY 90 tablet 3     methylphenidate (RITALIN) 20 MG tablet Take 1 tablet (20 mg) by mouth 2 times daily 60 tablet 0     mometasone (ELOCON) 0.1 % cream Apply sparingly to affected area twice daily as needed.  Do not apply to face. 45 g 0     simvastatin (ZOCOR) 20 MG tablet Take 1 tablet (20 mg) by mouth At Bedtime 90 tablet 2     OTC products: None, except as noted above    Allergies   Allergen Reactions     Bee Venom      Sulfa Drugs Hives      Latex Allergy: NO    Social History   Substance Use Topics     Smoking status: Never Smoker     Smokeless tobacco: Never Used     Alcohol use Yes      Comment: 5 beers a week     History   Drug Use No       REVIEW OF SYSTEMS:   GENERAL: negative for, fever, chills, weight loss, weight gain  EYES: negative  ENT: negative  RESPIRATORY: No dyspnea on exertion and No cough  CARDIOVASCULAR: negative for, palpitations, tachycardia, irregular heart beat and chest pain  GI: negative for, nausea, vomiting, hematemesis, melena and hematochezia  : dysuria and hematuria  MUSCULOSKELETAL: foot pain left due  to bunion, pain right top of foot  NEUROLOGIC: negative for, headaches, seizures, local weakness, numbness or tingling of hands and numbness or tingling of feet  SKIN: dermatitis stable left eyelid   ENDOCRINE: negative       EXAM:   There were no vitals taken for this visit.    Patient is alert, oriented, cooperative in no acute distress.  /72  Pulse 80  Temp 98.3  F (36.8  C) (Oral)  Resp 16  Wt 182 lb 9.6 oz (82.8 kg)  SpO2 96%  BMI 30.39 kg/m2    HEENT: PERRL, EOMI, TM's are normal. Oropharynx is clear.  NECK: No lymphadenopathy or thyromegaly. Carotid pulses full without bruits.  LUNGS: clear  CV: normal S1, S2 without murmur, S3 or S4 present. Pulses are 2/2 throughout. No JVD.  ABDOMEN: Bowel sounds present, nontender without hepatosplenomegaly. Liver is normal size to percussion.  EXTREMITIES: No edema, large bunion at the left first MTP joint, raised nodule in the dorsum of the right midfoot.  NEUROLOGIC: Cranial nerves II-XII intact, reflexes 2/4 throughout, strength 5/5, sensation grossly intact, gait normal.  SKIN: without rashes or significant lesions     DIAGNOSTICS:   EKG: appears normal, NSR, normal axis, normal intervals, no acute ST/T changes c/w ischemia, no LVH by voltage criteria, unchanged from previous tracings    Potassium: pending       Recent Labs   Lab Test  09/21/17   1713  08/02/17   0844   03/22/16   0752   06/24/10   0750   HGB  13.2   --    --   14.4   < >  13.6   PLT  238   --    --   287   < >   --    INR   --    --    --    --    --   0.91   NA  140  142   < >  140   < >  139   POTASSIUM  3.7  3.9   < >  4.4   < >  4.3   CR  0.92  1.02   < >  0.82   < >  1.07*    < > = values in this interval not displayed.        IMPRESSION:   Reason for surgery/procedure: bunion  Diagnosis/reason for consult: preop    The proposed surgical procedure is considered LOW risk.    REVISED CARDIAC RISK INDEX  The patient has the following serious cardiovascular risks for perioperative  complications such as (MI, PE, VFib and 3  AV Block):  No serious cardiac risks  INTERPRETATION: 0 risks: Class I (very low risk - 0.4% complication rate)    The patient has the following additional risks for perioperative complications:  No identified additional risks      ICD-10-CM    1. Preop general physical exam Z01.818 EKG 12-lead complete w/read - Clinics     Basic metabolic panel   2. Bunion, left M21.612 EKG 12-lead complete w/read - Clinics   3. History of bee sting allergy Z91.030 EPINEPHrine (EPIPEN/ADRENACLICK/OR ANY BX GENERIC EQUIV) 0.3 MG/0.3ML injection 2-pack   4. Essential hypertension, benign I10 Basic metabolic panel   5. Mixed hyperlipidemia E78.2 Lipid panel reflex to direct LDL Fasting       RECOMMENDATIONS:         She will hold all her medications morning surgery.  She will hold ACE inhibitor for 24 hours prior to the surgery.    APPROVAL GIVEN to proceed with proposed procedure, without further diagnostic evaluation       Signed Electronically by: Mae Hernandez MD    Copy of this evaluation report is provided to requesting physician.    [facility] Preop Guidelines    Revised Cardiac Risk Index

## 2018-06-12 NOTE — PATIENT INSTRUCTIONS
Do not take lisinopril the morning before or the day of surgery.   Do not take any other medications the morning of surgery.       Before Your Surgery      Call your surgeon if there is any change in your health. This includes signs of a cold or flu (such as a sore throat, runny nose, cough, rash or fever).    Do not smoke, drink alcohol or take over the counter medicine (unless your surgeon or primary care doctor tells you to) for the 24 hours before and after surgery.    If you take prescribed drugs: Follow your doctor s orders about which medicines to take and which to stop until after surgery.    Eating and drinking prior to surgery: follow the instructions from your surgeon    Take a shower or bath the night before surgery. Use the soap your surgeon gave you to gently clean your skin. If you do not have soap from your surgeon, use your regular soap. Do not shave or scrub the surgery site.  Wear clean pajamas and have clean sheets on your bed.

## 2018-06-12 NOTE — MR AVS SNAPSHOT
After Visit Summary   6/12/2018    Jayleen Lang    MRN: [id number]           Patient Information     Date Of Birth          1965        Visit Information        Provider Department      6/12/2018 2:00 PM Mae Hernandez MD [facility]        Today's Diagnoses     Preop general physical exam    -  1    Mariposa left        History of bee sting allergy          Care Instructions    Do not take lisinopril the morning before or the day of surgery.   Do not take any other medications the morning of surgery.       Before Your Surgery      Call your surgeon if there is any change in your health. This includes signs of a cold or flu (such as a sore throat, runny nose, cough, rash or fever).    Do not smoke, drink alcohol or take over the counter medicine (unless your surgeon or primary care doctor tells you to) for the 24 hours before and after surgery.    If you take prescribed drugs: Follow your doctor s orders about which medicines to take and which to stop until after surgery.    Eating and drinking prior to surgery: follow the instructions from your surgeon    Take a shower or bath the night before surgery. Use the soap your surgeon gave you to gently clean your skin. If you do not have soap from your surgeon, use your regular soap. Do not shave or scrub the surgery site.  Wear clean pajamas and have clean sheets on your bed.           Follow-ups after your visit        Your next 10 appointments already scheduled     Federico 15, 2018   Procedure with Luis Fernando So MD   [facility] ([facility])    [address] [facility] [address]   [phone]           [facility] is located at [address] [facility]. [place]            Jul 06, 2018   Procedure with Eliezer Parra DPM   [facility] (--)    [address] [facility] [address]   [phone]              Who to contact     If you have questions or need  "follow up information about today's clinic visit or your schedule please contact [facility] directly at [phone].  Normal or non-critical lab and imaging results will be communicated to you by [employer], letter or phone within 4 business days after the clinic has received the results. If you do not hear from us within 7 days, please contact the clinic through [employer] or phone. If you have a critical or abnormal lab result, we will notify you by phone as soon as possible.  Submit refill requests through [email or web address] or call your pharmacy and they will forward the refill request to us. Please allow 3 business days for your refill to be completed.          Additional Information About Your Visit        [employer] Information     [email or web address] lets you send messages to your doctor, view your test results, renew your prescriptions, schedule appointments and more. To sign up, go to [email or web address] . Click on \"Log in\" on the left side of the screen, which will take you to the Welcome page. Then click on \"Sign up Now\" on the right side of the page.     You will be asked to enter the access code listed below, as well as some personal information. Please follow the directions to create your username and password.     Your access code is: [email or web address]  Expires: 9/10/2018  1:38 PM     Your access code will  in 90 days. If you need help or a new code, please call your [facility] clinic or [phone].        Care EveryWhere ID     This is your Care EveryWhere ID. This could be used by other organizations to access your [facility] medical records  [id number]        Your Vitals Were     Pulse Temperature Respirations Pulse Oximetry BMI (Body Mass Index)       80 98.3  F (36.8  C) (Oral) 16 96% 30.39 kg/m2        Blood Pressure from Last 3 Encounters:   18 104/72   18 106/74   17 110/80    Weight from Last 3 Encounters:   18 182 lb 9.6 oz (82.8 kg)   18 170 lb (77.1 kg)   17 175 " lb (79.4 kg)              We Performed the Following     EKG 12-lead complete w/read - Clinics          Today's Medication Changes          These changes are accurate as of 6/12/18  2:31 PM.  If you have any questions, ask your nurse or doctor.               These medicines have changed or have updated prescriptions.        Dose/Directions    fluticasone 50 MCG/ACT spray   Commonly known as:  FLONASE   This may have changed:    - when to take this  - reasons to take this   Used for:  Pollen allergies        Dose:  1-2 spray   Spray 1-2 sprays into both nostrils daily   Quantity:  48 g   Refills:  3            Where to get your medicines      These medications were sent to [id number] - [place], [place] - [address]  [address], [facility] [address]     Phone:  [phone]     EPINEPHrine 0.3 MG/0.3ML injection 2-pack                Primary Care Provider Office Phone # Fax #    [facility] [phone]       [address]  [facility] [address]        Equal Access to Services     [facility] : Hadii aad ku hadasho Soomaali, waaxda luqadaha, qaybta kaalmada adeegyada, waxay kolby haybenn chavo martin . So [facility] [phone].    ATENCIÓN: Si habla español, tiene a villagomez disposición servicios gratuitos de asistencia lingüística. [facility] [phone].    We comply with applicable federal civil rights laws and [place] laws. We do not discriminate on the basis of race, color, national origin, age, disability, sex, sexual orientation, or gender identity.            Thank you!     Thank you for choosing [facility]  for your care. Our goal is always to provide you with excellent care. Hearing back from our patients is one way we can continue to improve our services. Please take a few minutes to complete the written survey that you may receive in the mail after your visit with us. Thank you!             Your Updated Medication List - Protect others around  you: Learn how to safely use, store and throw away your medicines at www.disposemymeds.org.          This list is accurate as of 6/12/18  2:31 PM.  Always use your most recent med list.                   Brand Name Dispense Instructions for use Diagnosis    ALPRAZolam 0.5 MG tablet    XANAX    30 tablet    TAKE 1 TABLET BY MOUTH DAILY AS NEEDED FOR ANXIETY    REBEKAH (generalized anxiety disorder)       cetirizine 10 MG tablet    zyrTEC    90 tablet    Take 1 tablet (10 mg) by mouth every evening    Chronic seasonal allergic rhinitis, unspecified trigger       EPINEPHrine 0.3 MG/0.3ML injection 2-pack    EPIPEN/ADRENACLICK/or ANY BX GENERIC EQUIV    0.3 mL    Inject 0.3 mLs (0.3 mg) into the muscle once as needed for anaphylaxis    History of bee sting allergy       fluticasone 50 MCG/ACT spray    FLONASE    48 g    Spray 1-2 sprays into both nostrils daily    Pollen allergies       gabapentin 600 MG tablet    NEURONTIN    90 tablet    TAKE ONE TABLET BY MOUTH 3 TIMES DAILY.    Neuropathy       hydrochlorothiazide 12.5 MG capsule    MICROZIDE    90 capsule    Take 1 capsule (12.5 mg) by mouth daily    Essential hypertension       lisinopril 20 MG tablet    PRINIVIL/ZESTRIL    90 tablet    TAKE ONE TABLET BY MOUTH DAILY    Essential hypertension       methylphenidate 20 MG tablet    RITALIN    60 tablet    Take 1 tablet (20 mg) by mouth 2 times daily    Attention deficit disorder, unspecified hyperactivity presence       mometasone 0.1 % cream    ELOCON    45 g    Apply sparingly to affected area twice daily as needed.  Do not apply to face.    Angular cheilitis       simvastatin 20 MG tablet    ZOCOR    90 tablet    Take 1 tablet (20 mg) by mouth At Bedtime    Mixed hyperlipidemia

## 2018-06-13 RX ORDER — ALPRAZOLAM 0.5 MG
TABLET ORAL
Qty: 30 TABLET | Refills: 0 | Status: SHIPPED | OUTPATIENT
Start: 2018-06-13 | End: 2018-07-09

## 2018-06-13 NOTE — TELEPHONE ENCOUNTER
xanax      Last Written Prescription Date:  5/16/18  Last Fill Quantity: 30,   # refills: 0  Last Office Visit: 5/12/18  Future Office visit:       Routing refill request to provider for review/approval because:  Drug not on the [facility], P or [facility] refill protocol or controlled substance

## 2018-06-14 ENCOUNTER — TELEPHONE (OUTPATIENT)
Dept: INTERNAL MEDICINE | Facility: CLINIC | Age: 53
End: 2018-06-14

## 2018-06-14 NOTE — TELEPHONE ENCOUNTER
Panel Management Review      Patient has the following on her problem list: None      Composite cancer screening  Chart review shows that this patient is due/due soon for the following Colonoscopy  Summary:    Patient is due/failing the following:   COLONOSCOPY    Action needed:   Patient needs referral/order: No action req    Type of outreach:    already schedule 06/15/2018    Questions for provider review:    None                                                                                                                                     Mary Ann Birch CMA       Chart routed to closed .

## 2018-06-15 ENCOUNTER — HOSPITAL ENCOUNTER (OUTPATIENT)
Facility: CLINIC | Age: 53
Discharge: HOME | End: 2018-06-15
Attending: INTERNAL MEDICINE | Admitting: INTERNAL MEDICINE
Payer: COMMERCIAL

## 2018-06-15 VITALS
WEIGHT: 182 LBS | OXYGEN SATURATION: 97 % | HEIGHT: 65 IN | DIASTOLIC BLOOD PRESSURE: 88 MMHG | SYSTOLIC BLOOD PRESSURE: 123 MMHG | RESPIRATION RATE: 16 BRPM | BODY MASS INDEX: 30.32 KG/M2

## 2018-06-15 LAB — COLONOSCOPY: NORMAL

## 2018-06-15 PROCEDURE — G0500 MOD SEDAT ENDO SERVICE >5YRS: HCPCS | Performed by: INTERNAL MEDICINE

## 2018-06-15 PROCEDURE — 99153 MOD SED SAME PHYS/QHP EA: CPT | Performed by: INTERNAL MEDICINE

## 2018-06-15 PROCEDURE — G0121 COLON CA SCRN NOT HI RSK IND: HCPCS | Performed by: INTERNAL MEDICINE

## 2018-06-15 PROCEDURE — 25000128 H RX IP 250 OP 636: Performed by: INTERNAL MEDICINE

## 2018-06-15 PROCEDURE — 45378 DIAGNOSTIC COLONOSCOPY: CPT | Performed by: INTERNAL MEDICINE

## 2018-06-15 RX ORDER — NALOXONE HYDROCHLORIDE 0.4 MG/ML
.1-.4 INJECTION, SOLUTION INTRAMUSCULAR; INTRAVENOUS; SUBCUTANEOUS
Status: DISCONTINUED | OUTPATIENT
Start: 2018-06-15 | End: 2018-06-15 | Stop reason: HOSPADM

## 2018-06-15 RX ORDER — FENTANYL CITRATE 50 UG/ML
INJECTION, SOLUTION INTRAMUSCULAR; INTRAVENOUS PRN
Status: DISCONTINUED | OUTPATIENT
Start: 2018-06-15 | End: 2018-06-15 | Stop reason: HOSPADM

## 2018-06-15 RX ORDER — ONDANSETRON 2 MG/ML
4 INJECTION INTRAMUSCULAR; INTRAVENOUS
Status: DISCONTINUED | OUTPATIENT
Start: 2018-06-15 | End: 2018-06-15 | Stop reason: HOSPADM

## 2018-06-15 RX ORDER — ONDANSETRON 4 MG/1
4 TABLET, ORALLY DISINTEGRATING ORAL EVERY 6 HOURS PRN
Status: DISCONTINUED | OUTPATIENT
Start: 2018-06-15 | End: 2018-06-15 | Stop reason: HOSPADM

## 2018-06-15 RX ORDER — ONDANSETRON 2 MG/ML
4 INJECTION INTRAMUSCULAR; INTRAVENOUS EVERY 6 HOURS PRN
Status: DISCONTINUED | OUTPATIENT
Start: 2018-06-15 | End: 2018-06-15 | Stop reason: HOSPADM

## 2018-06-15 RX ORDER — FLUMAZENIL 0.1 MG/ML
0.2 INJECTION, SOLUTION INTRAVENOUS
Status: DISCONTINUED | OUTPATIENT
Start: 2018-06-15 | End: 2018-06-15 | Stop reason: HOSPADM

## 2018-06-15 RX ORDER — LIDOCAINE 40 MG/G
CREAM TOPICAL
Status: DISCONTINUED | OUTPATIENT
Start: 2018-06-15 | End: 2018-06-15 | Stop reason: HOSPADM

## 2018-06-15 NOTE — H&P
Pre-Endoscopy History and Physical     Jayleen Lang MRN# [id number]   YOB: 1965 Age: 52 year old     Date of Procedure: 6/15/2018  Primary care provider: [facility]  Type of Endoscopy: Colonoscopy with possible biopsy, possible polypectomy  Reason for Procedure: screen  Type of Anesthesia Anticipated: Conscious Sedation    HPI:    Jayleen is a 52 year old female who will be undergoing the above procedure.      A history and physical has been performed. The patient's medications and allergies have been reviewed. The risks and benefits of the procedure and the sedation options and risks were discussed with the patient.  All questions were answered and informed consent was obtained.      She denies a personal or family history of anesthesia complications or bleeding disorders.     Patient Active Problem List   Diagnosis     CARDIOVASCULAR SCREENING; LDL GOAL LESS THAN 160     Rash     Menorrhagia     Obesity     REBEKAH (generalized anxiety disorder)     Essential hypertension, benign     Allergic reaction     Mixed hyperlipidemia     Chronic neck pain     Controlled substance agreement signed     Attention deficit disorder, unspecified hyperactivity presence        Past Medical History:   Diagnosis Date     ADD (attention deficit disorder)      Obesity         Past Surgical History:   Procedure Laterality Date     ARTHROPLASTY HIP  2017    left hip replaced     ARTHROSCOPIC REPAIR POSTERIOR CRUCIATE LIGAMENT        DELIVERY ONLY  10/1998     COLONOSCOPY  06/15/2018    Dr. So [facility]     Foot surgery - bone spurs  2002     GYN SURGERY       x 2     LAMINECT/DISCECTOMY, CERVICAL      C6-C7 Fusion.       Social History   Substance Use Topics     Smoking status: Never Smoker     Smokeless tobacco: Never Used     Alcohol use Yes      Comment: 5 beers a week       Family History   Problem Relation Age of Onset     CANCER Mother      lung     HEART DISEASE Father      congestive heart  "failure     Colon Cancer No family hx of        Prior to Admission medications    Medication Sig Start Date End Date Taking? Authorizing Provider   ALPRAZolam (XANAX) 0.5 MG tablet TAKE 1 TABLET BY MOUTH DAILY AS NEEDED FOR ANXIETY 6/13/18   [facility] Castellanos MD   cetirizine (ZYRTEC) 10 MG tablet Take 1 tablet (10 mg) by mouth every evening 1/3/18   New Castellanos MD   EPINEPHrine (EPIPEN/ADRENACLICK/OR ANY BX GENERIC EQUIV) 0.3 MG/0.3ML injection 2-pack Inject 0.3 mLs (0.3 mg) into the muscle once as needed for anaphylaxis 6/12/18   Mae Hernandez MD   fluticasone (FLONASE) 50 MCG/ACT nasal spray Spray 1-2 sprays into both nostrils daily  Patient taking differently: Spray 1-2 sprays into both nostrils daily as needed  6/16/16   Gm Green MD   gabapentin (NEURONTIN) 600 MG tablet TAKE ONE TABLET BY MOUTH 3 TIMES DAILY. 5/25/18   New Castellanos MD   hydrochlorothiazide (MICROZIDE) 12.5 MG capsule Take 1 capsule (12.5 mg) by mouth daily 8/9/17   New Castellanos MD   lisinopril (PRINIVIL/ZESTRIL) 20 MG tablet TAKE ONE TABLET BY MOUTH DAILY 8/9/17   New Castellanos MD   methylphenidate (RITALIN) 20 MG tablet Take 1 tablet (20 mg) by mouth 2 times daily 6/8/18   New Castellanos MD   mometasone (ELOCON) 0.1 % cream Apply sparingly to affected area twice daily as needed.  Do not apply to face.  Patient not taking: Reported on 6/12/2018 6/8/16   Gm Green MD   simvastatin (ZOCOR) 20 MG tablet Take 1 tablet (20 mg) by mouth At Bedtime 11/15/17   New Castellanos MD       Allergies   Allergen Reactions     Bee Venom      Sulfa Drugs Hives        REVIEW OF SYSTEMS:   5 point ROS negative except as noted above in HPI, including Gen., Resp., CV, GI &  system review.    PHYSICAL EXAM:   There were no vitals taken for this visit. Estimated body mass index is 30.39 kg/(m^2) as calculated from the following:    Height as of 1/3/18: 1.651 m (5' 5\").    Weight as of 6/12/18: 82.8 kg (182 lb 9.6 " oz).   GENERAL APPEARANCE: alert, and oriented  MENTAL STATUS: alert  AIRWAY EXAM: Mallampatti Class I (visualization of the soft palate, fauces, uvula, anterior and posterior pillars)  RESP: lungs clear to auscultation - no rales, rhonchi or wheezes  CV: regular rates and rhythm  DIAGNOSTICS:    Not indicated    IMPRESSION   ASA Class 2 - Mild systemic disease    PLAN:   Plan for Colonoscopy with possible biopsy, possible polypectomy. We discussed the risks, benefits and alternatives and the patient wished to proceed.    The above has been forwarded to the consulting provider.      Signed Electronically by: Luis Fernando oS  Emmanuelle 15, 2018

## 2018-06-15 NOTE — DISCHARGE INSTRUCTIONS
Understanding Diverticulosis and Diverticulitis     Pouches or diverticula usually occur in the lower part of the colon called the sigmoid.      Diverticulitis occurs when the pouches become inflamed.     The colon (large intestine) is the last part of the digestive tract. It absorbs water from stool and changes it from a liquid to a solid. In certain cases, small pouches called diverticula can form in the colon wall. This condition is called diverticulosis. The pouches can become infected. If this happens, it becomes a more serious problem called diverticulitis. These problems can be painful. But they can be managed.   Managing Your Condition  Diet changes or taking medications are often tried first. These may be enough to bring relief. If the case is bad, surgery may be done. You and your doctor can discuss the plan that is best for you.  If You Have Diverticulosis  Diet changes are often enough to control symptoms. The main changes are adding fiber (roughage) and drinking more water. Fiber absorbs water as it travels through your colon. This helps your stool stay soft and move smoothly. Water helps this process. If needed, you may be told to take over-the-counter stool softeners. To help relieve pain, antispasmodic medications may be prescribed.  If You Have Diverticulitis  Treatment depends on how bad your symptoms are.  For mild symptoms: You may be put on a liquid diet for a short time. You may also be prescribed antibiotics. If these two steps relieve your symptoms, you may then be prescribed a high-fiber diet. If you still have symptoms, your doctor will discuss further treatment options with you.  For severe symptoms: You may need to be admitted to the hospital. There, you can be given IV antibiotics and fluids. Once symptoms are under control, the above treatments may be tried. If these don t control your condition, your doctor may discuss the option of having surgery with you.  [place] to Colon  Health  Help keep your colon healthy with a diet that includes plenty of high-fiber fruits, vegetables, and whole grains. Drink plenty of liquids like water and juice. Your doctor may also recommend avoiding seeds and nuts.          [id number] Rema Jones, [address] [place] [address], [place] [address]. All rights reserved. This information is not intended as a substitute for professional medical care. Always follow your healthcare professional's instructions.    HIGH FIBER DIET  Fiber is present in all fruits, vegetables, cereals and grains. Fiber passes through the body undigested. A high fiber diet helps food move through the intestinal tract. The added bulk is helpful in preventing constipation. In people with diverticulosis it serves to clean out the pouches along the colon wall while preventing new ones from forming. A high fiber diet also reduces the risk of colon cancer, decreases blood cholesterol and prevents high blood sugar in people with diabetes.    The foods listed below are high in fiber and should be included in your diet. If you are not used to high fiber foods, start with 1 or 2 foods from this list. Every 3-4 days add a new one to your diet until you are eating 4 high fiber foods per day. This should give you 20-35 Gm of fiber/day. It is also important to drink a lot of water when you are on this diet (6-8 glasses a day). Water causes the fiber to swell and increases the benefit.    FOODS HIGH IN DIETARY FIBER:  BREADS: Made with 100% whole wheat flour; brittany, wheat or rye crackers; tortillas, bran muffins  CEREALS: Whole grain cereal with bran (Chex, Raisin Bran, Corn Bran), oatmeal, rolled oats, granola, wheat flakes, brown rice  NUTS: Any nuts  FRUITS: All fresh fruits along with edible skins, (bananas, citrus fruit, mangoes, pears, prunes, raisins, apples, pineapple, apricot, melon, jams and marmalades), fruit juices (especially prune juice)  VEGETABLES: All types, preferably raw or lightly  cooked: especially, celery, eggplant, potatoes, spinach, broccoli, brussel sprouts, winter squash, carrots, cauliflower, soybeans, lentils, fresh and dried beans of all kinds  OTHER: Popcorn, any spices      [id number] Rema Jones, [address] [place] [address], [place] [address]. All rights reserved. This information is not intended as a substitute for professional medical care. Always follow your healthcare professional's instructions.

## 2018-06-15 NOTE — IP AVS SNAPSHOT
MRN:[id number]                      After Visit Summary   6/15/2018    Jayleen Lang    MRN: [id number]           Thank you!     Thank you for choosing [facility] for your care. Our goal is always to provide you with excellent care. Hearing back from our patients is one way we can continue to improve our services. Please take a few minutes to complete the written survey that you may receive in the mail after you visit. If you would like to speak to someone directly about your visit please contact Patient Relations at [phone]. Thank you!          Patient Information     Date Of Birth          1965        About your hospital stay     You were admitted on:  Emmanuelle 15, 2018 You last received care in the:  [facility]    You were discharged on:  Emmanuelle 15, 2018       Who to Call     For medical emergencies, please call 911.  For non-urgent questions about your medical care, please call your primary care provider or clinic, [phone]  For questions related to your surgery, please call your surgery clinic        Attending Provider     Provider Specialty    Luis Fernando So MD Gastroenterology       Primary Care Provider Office Phone # Fax #    [place] [phone]      Your next 10 appointments already scheduled     Jul 02, 2018 10:15 AM CDT   LAB with [facility] LAB   [facility]    [address]  [facility] [id number] [phone]           Please do not eat 10-12 hours before your appointment if you are coming in fasting for labs on lipids, cholesterol, or glucose (sugar). This does not apply to pregnant women. Water, hot tea and black coffee (with nothing added) are okay. Do not drink other fluids, diet soda or chew gum.            Jul 06, 2018   Procedure with Eliezer Parra DPM   [facility] (--)    [address]  [facility] [id number] [phone]               Further instructions from your care team         Understanding Diverticulosis and Diverticulitis     Pouches or diverticula usually occur in the lower part of the colon called the sigmoid.      Diverticulitis occurs when the pouches become inflamed.     The colon (large intestine) is the last part of the digestive tract. It absorbs water from stool and changes it from a liquid to a solid. In certain cases, small pouches called diverticula can form in the colon wall. This condition is called diverticulosis. The pouches can become infected. If this happens, it becomes a more serious problem called diverticulitis. These problems can be painful. But they can be managed.   Managing Your Condition  Diet changes or taking medications are often tried first. These may be enough to bring relief. If the case is bad, surgery may be done. You and your doctor can discuss the plan that is best for you.  If You Have Diverticulosis  Diet changes are often enough to control symptoms. The main changes are adding fiber (roughage) and drinking more water. Fiber absorbs water as it travels through your colon. This helps your stool stay soft and move smoothly. Water helps this process. If needed, you may be told to take over-the-counter stool softeners. To help relieve pain, antispasmodic medications may be prescribed.  If You Have Diverticulitis  Treatment depends on how bad your symptoms are.  For mild symptoms: You may be put on a liquid diet for a short time. You may also be prescribed antibiotics. If these two steps relieve your symptoms, you may then be prescribed a high-fiber diet. If you still have symptoms, your doctor will discuss further treatment options with you.  For severe symptoms: You may need to be admitted to the hospital. There, you can be given IV antibiotics and fluids. Once symptoms are under control, the above treatments may be tried. If these don t control your condition, your doctor may discuss the option of  having surgery with you.  [place] to Colon Health  Help keep your colon healthy with a diet that includes plenty of high-fiber fruits, vegetables, and whole grains. Drink plenty of liquids like water and juice. Your doctor may also recommend avoiding seeds and nuts.          [id number] [employer], [address] [place] [address]. All rights reserved. This information is not intended as a substitute for professional medical care. Always follow your healthcare professional's instructions.    HIGH FIBER DIET  Fiber is present in all fruits, vegetables, cereals and grains. Fiber passes through the body undigested. A high fiber diet helps food move through the intestinal tract. The added bulk is helpful in preventing constipation. In people with diverticulosis it serves to clean out the pouches along the colon wall while preventing new ones from forming. A high fiber diet also reduces the risk of colon cancer, decreases blood cholesterol and prevents high blood sugar in people with diabetes.    The foods listed below are high in fiber and should be included in your diet. If you are not used to high fiber foods, start with 1 or 2 foods from this list. Every 3-4 days add a new one to your diet until you are eating 4 high fiber foods per day. This should give you 20-35 Gm of fiber/day. It is also important to drink a lot of water when you are on this diet (6-8 glasses a day). Water causes the fiber to swell and increases the benefit.    FOODS HIGH IN DIETARY FIBER:  BREADS: Made with 100% whole wheat flour; brittany, wheat or rye crackers; tortillas, bran muffins  CEREALS: Whole grain cereal with bran (Chex, Raisin Bran, Corn Bran), oatmeal, rolled oats, granola, wheat flakes, brown rice  NUTS: Any nuts  FRUITS: All fresh fruits along with edible skins, (bananas, citrus fruit, mangoes, pears, prunes, raisins, apples, pineapple, apricot, melon, jams and marmalades), fruit juices (especially prune juice)  VEGETABLES:  "All types, preferably raw or lightly cooked: especially, celery, eggplant, potatoes, spinach, broccoli, brussel sprouts, winter squash, carrots, cauliflower, soybeans, lentils, fresh and dried beans of all kinds  OTHER: Popcorn, any spices      [id number] [employer] \[facility]\"", [address] [place] [address]. All rights reserved. This information is not intended as a substitute for professional medical care. Always follow your healthcare professional's instructions.    Pending Results     No orders found from 2018 to 2018.            Admission Information     Date & Time Provider Department Dept. Phone    6/15/2018 Luis Fernando So MD [facility] [phone]      Your Vitals Were     Blood Pressure Respirations Height Weight Pulse Oximetry BMI (Body Mass Index)    117/82 6 1.651 m (5' 5\") 82.6 kg (182 lb) 97% 30.29 kg/m2      [facility] Information     [facility] lets you send messages to your doctor, view your test results, renew your prescriptions, schedule appointments and more. To sign up, go to [email or web address] . Click on \"Log in\" on the left side of the screen, which will take you to the Welcome page. Then click on \"Sign up Now\" on the right side of the page.     You will be asked to enter the access code listed below, as well as some personal information. Please follow the directions to create your username and password.     Your access code is: [email or web address]  Expires: 9/10/2018  1:38 PM     Your access code will  in 90 days. If you need help or a new code, please call your [place] clinic or [phone].        Care EveryWhere ID     This is your Care EveryWhere ID. This could be used by other organizations to access your [place] medical records  [id number]        Equal Access to Services     [facility] : Jerome Lindsay, skip chase, qamendez lyles. [facility] [phone].    ATENCIÓN: Si habla " español, tiene a villagomez disposición servicios gratuitos de asistencia lingüística. Natalie figueroa [phone].    We comply with applicable federal civil rights laws and [place] laws. We do not discriminate on the basis of race, color, national origin, age, disability, sex, sexual orientation, or gender identity.               Review of your medicines      CONTINUE these medicines which may have CHANGED, or have new prescriptions. If we are uncertain of the size of tablets/capsules you have at home, strength may be listed as something that might have changed.        Dose / Directions    fluticasone 50 MCG/ACT spray   Commonly known as:  FLONASE   This may have changed:    - when to take this  - reasons to take this   Used for:  Pollen allergies        Dose:  1-2 spray   Spray 1-2 sprays into both nostrils daily   Quantity:  48 g   Refills:  3         CONTINUE these medicines which have NOT CHANGED        Dose / Directions    ALPRAZolam 0.5 MG tablet   Commonly known as:  XANAX   Used for:  REBEKAH (generalized anxiety disorder)        TAKE 1 TABLET BY MOUTH DAILY AS NEEDED FOR ANXIETY   Quantity:  30 tablet   Refills:  0       cetirizine 10 MG tablet   Commonly known as:  zyrTEC   Used for:  Chronic seasonal allergic rhinitis, unspecified trigger        Dose:  10 mg   Take 1 tablet (10 mg) by mouth every evening   Quantity:  90 tablet   Refills:  3       EPINEPHrine 0.3 MG/0.3ML injection 2-pack   Commonly known as:  EPIPEN/ADRENACLICK/or ANY BX GENERIC EQUIV   Used for:  History of bee sting allergy        Dose:  0.3 mg   Inject 0.3 mLs (0.3 mg) into the muscle once as needed for anaphylaxis   Quantity:  0.3 mL   Refills:  1       gabapentin 600 MG tablet   Commonly known as:  NEURONTIN   Used for:  Neuropathy        TAKE ONE TABLET BY MOUTH 3 TIMES DAILY.   Quantity:  90 tablet   Refills:  3       hydrochlorothiazide 12.5 MG capsule   Commonly known as:  MICROZIDE   Used for:  Essential hypertension        Dose:  12.5 mg    Take 1 capsule (12.5 mg) by mouth daily   Quantity:  90 capsule   Refills:  3       lisinopril 20 MG tablet   Commonly known as:  PRINIVIL/ZESTRIL   Used for:  Essential hypertension        TAKE ONE TABLET BY MOUTH DAILY   Quantity:  90 tablet   Refills:  3       methylphenidate 20 MG tablet   Commonly known as:  RITALIN   Used for:  Attention deficit disorder, unspecified hyperactivity presence        Dose:  20 mg   Take 1 tablet (20 mg) by mouth 2 times daily   Quantity:  60 tablet   Refills:  0       mometasone 0.1 % cream   Commonly known as:  ELOCON   Used for:  Angular cheilitis        Apply sparingly to affected area twice daily as needed.  Do not apply to face.   Quantity:  45 g   Refills:  0       simvastatin 20 MG tablet   Commonly known as:  ZOCOR   Used for:  Mixed hyperlipidemia        Dose:  20 mg   Take 1 tablet (20 mg) by mouth At Bedtime   Quantity:  90 tablet   Refills:  2                Protect others around you: Learn how to safely use, store and throw away your medicines at www.disposemymeds.org.             Medication List: This is a list of all your medications and when to take them. Check marks below indicate your daily home schedule. Keep this list as a reference.      Medications           Morning Afternoon Evening Bedtime As Needed    ALPRAZolam 0.5 MG tablet   Commonly known as:  XANAX   TAKE 1 TABLET BY MOUTH DAILY AS NEEDED FOR ANXIETY                                cetirizine 10 MG tablet   Commonly known as:  zyrTEC   Take 1 tablet (10 mg) by mouth every evening                                EPINEPHrine 0.3 MG/0.3ML injection 2-pack   Commonly known as:  EPIPEN/ADRENACLICK/or ANY BX GENERIC EQUIV   Inject 0.3 mLs (0.3 mg) into the muscle once as needed for anaphylaxis                                fluticasone 50 MCG/ACT spray   Commonly known as:  FLONASE   Spray 1-2 sprays into both nostrils daily                                gabapentin 600 MG tablet   Commonly known as:   NEURONTIN   TAKE ONE TABLET BY MOUTH 3 TIMES DAILY.                                hydrochlorothiazide 12.5 MG capsule   Commonly known as:  MICROZIDE   Take 1 capsule (12.5 mg) by mouth daily                                lisinopril 20 MG tablet   Commonly known as:  PRINIVIL/ZESTRIL   TAKE ONE TABLET BY MOUTH DAILY                                methylphenidate 20 MG tablet   Commonly known as:  RITALIN   Take 1 tablet (20 mg) by mouth 2 times daily                                mometasone 0.1 % cream   Commonly known as:  ELOCON   Apply sparingly to affected area twice daily as needed.  Do not apply to face.                                simvastatin 20 MG tablet   Commonly known as:  ZOCOR   Take 1 tablet (20 mg) by mouth At Bedtime                                          More Information        Understanding Diverticulosis and Diverticulitis     Pouches or diverticula usually occur in the lower part of the colon called the sigmoid.     The colon (large intestine) is the last part of the digestive tract. It absorbs water from stool and changes it from a liquid to a solid. In certain cases, small pouches called diverticula can form in the colon wall. This condition is called diverticulosis. The pouches can become infected. If this happens, it becomes a more serious problem called diverticulitis. These problems can be painful. But they can be managed.  Managing your condition  Diet changes or medicines may be prescribed.   If you have diverticulosis  Recommendations include:    Diet changes are often enough to control symptoms. The main changes are adding fiber (roughage) and drinking more water. Fiber absorbs water as it travels through your colon. This helps your stool stay soft and move smoothly. Water helps this process.    If needed, you may be told to take over-the-counter stool softeners.    To help relieve pain, antispasmodic medicines may be prescribed.    Watch for changes in your bowel movements.  Tell the healthcare provider if you notice any changes.    Begin an exercise program. Ask your healthcare provider how to get started.    Get plenty of rest and sleep.   If you have diverticulitis  Treatment depends on how bad your symptoms are.    For mild symptoms. You may be put on a liquid diet for a short time. Antibiotics are usually prescribed. If these two steps relieve your symptoms, you may then be prescribed a high-fiber diet. If you still have symptoms, your healthcare provider will discuss more treatment choices with you.    For severe symptoms. You may need to be admitted to the hospital. There, you can be given IV antibiotics and fluids. You will also be put on a low-fiber or liquid diet. Although not common, surgery is needed in some people with severe symptoms.  [place] to colon health     Diverticulitis occurs when the pouches become infected or inflamed.     Help keep your colon healthy with a diet that includes plenty of high-fiber fruits, vegetables, and whole grains. Drink plenty of liquids like water and juice. Maintain a healthy lifestyle including regular exercise, stress management, and adequate rest and sleep.   Date Last Reviewed: 7/1/2016 2000-2017 [employer]. [address] [place] [address]. All rights reserved. This information is not intended as a substitute for professional medical care. Always follow your healthcare professional's instructions.                Discharge Instructions: Eating a High-Fiber Diet  Your healthcare provider has prescribed a high-fiber diet for you. Fiber is what gives strength and structure to plants. Most grains, beans, vegetables, and fruits contain fiber. Foods rich in fiber are often low in calories and fat, but they fill you up more. These foods may also reduce the risk of certain health problems.  There are two types of fiber:    Insoluble fiber. This is found in whole-grains, cereals, and certain fruits and vegetables (such  as apple skins, corn, and beans). Insoluble fiber is made up mainly of plant cell walls. It may prevent constipation and reduce the risk of certain types of cancer.    Soluble fiber. This type of fiber is found in oats, beans, nuts, and certain fruits and vegetables (such as strawberries and peas). Soluble fiber turns to gel in the digestive system, slowing the movement of the digestive tract. It helps control blood sugar levels and can reduce cholesterol, which may help lower the risk of heart disease. Soluble fiber can also help control appetite.     Home care    Know how much fiber you need a day. The recommended daily amount of fiber is 25 grams for women and 38 grams for men. After age 50, daily fiber needs drop to 21 grams for women and 30 grams for men.    Ask your healthcare provider about a fiber supplement. (Always take fiber supplements with a large glass of water.)    Keep track of how much fiber you eat.    Eat a variety of foods high in fiber.    Learn to read and understand food labels.    Ask your healthcare provider how much water you should be drinking.    Look for these high-fiber foods:  ? Whole-grain breads and cereals    6 ounces a day give you about 18 grams of fiber (1 ounce is equal to 1 slice of bread, 1 cup of dry cereal, or 1/2 cup of cooked rice).    Include wheat and oat bran cereals, whole-wheat muffins or toast, and corn tortillas in your meals.  ? Fruits     2 cups a day give you about 8 grams of fiber.    Apples, oranges, strawberries, pears, and bananas are good sources.    Fruit juice does not contain as much fiber as the fruit it was made from.  ? Vegetables    2  cups a day give you about 11 grams of fiber. Add asparagus, carrots, broccoli, peas, and corn to your meals.  ? Legumes    1/4 cup a day (in place of meat) gives you about 4 grams of fiber. Try navy beans, lentils, chickpeas, and soybeans.  ? Seeds     A small handful of seeds gives you about 3 grams of fiber. Try  sunflower seeds.  Follow-up  Make a follow-up appointment, or as advised. Ask your healthcare provider if seeing a registered dietitian may help you plan a high fiber diet.  Date Last Reviewed: 6/1/2017 2000-2017 [employer]. [address] [place] [address]. All rights reserved. This information is not intended as a substitute for professional medical care. Always follow your healthcare professional's instructions.

## 2018-07-03 DIAGNOSIS — I10 ESSENTIAL HYPERTENSION, BENIGN: ICD-10-CM

## 2018-07-03 DIAGNOSIS — E78.2 MIXED HYPERLIPIDEMIA: ICD-10-CM

## 2018-07-03 DIAGNOSIS — Z01.818 PREOP GENERAL PHYSICAL EXAM: ICD-10-CM

## 2018-07-03 LAB
ANION GAP SERPL CALCULATED.3IONS-SCNC: 7 MMOL/L (ref 3–14)
BUN SERPL-MCNC: 20 MG/DL (ref 7–30)
CALCIUM SERPL-MCNC: 8.9 MG/DL (ref 8.5–10.1)
CHLORIDE SERPL-SCNC: 105 MMOL/L (ref 94–109)
CHOLEST SERPL-MCNC: 210 MG/DL
CO2 SERPL-SCNC: 29 MMOL/L (ref 20–32)
CREAT SERPL-MCNC: 0.91 MG/DL (ref 0.52–1.04)
GFR SERPL CREATININE-BSD FRML MDRD: 65 ML/MIN/1.7M2
GLUCOSE SERPL-MCNC: 93 MG/DL (ref 70–99)
HDLC SERPL-MCNC: 86 MG/DL
LDLC SERPL CALC-MCNC: 110 MG/DL
NONHDLC SERPL-MCNC: 124 MG/DL
POTASSIUM SERPL-SCNC: 3.8 MMOL/L (ref 3.4–5.3)
SODIUM SERPL-SCNC: 141 MMOL/L (ref 133–144)
TRIGL SERPL-MCNC: 72 MG/DL

## 2018-07-03 PROCEDURE — 80061 LIPID PANEL: CPT | Performed by: INTERNAL MEDICINE

## 2018-07-03 PROCEDURE — 36415 COLL VENOUS BLD VENIPUNCTURE: CPT | Performed by: INTERNAL MEDICINE

## 2018-07-03 PROCEDURE — 80048 BASIC METABOLIC PNL TOTAL CA: CPT | Performed by: INTERNAL MEDICINE

## 2018-07-05 NOTE — H&P (VIEW-ONLY)
[place] [address]  [facility] [address]  [phone]  Dept: [phone]    PRE-OP EVALUATION:  Today's date: 2018    Jayleen Lang (: 1965) presents for pre-operative evaluation assessment as requested by Dr. Parra.  She requires evaluation and anesthesia risk assessment prior to undergoing surgery/procedure for treatment of left bunion .      Primary Physician: New Castellanos  Type of Anesthesia Anticipated: to be determined    Patient has a Health Care Directive or Living Will:  NO    Preop Questions 2018   Who is doing your surgery? Dr Parra   What are you having done? L bunionectomy   Date of Surgery/Procedure: 2017 at 7:30 am   Facility or Hospital where procedure/surgery will be performed: [facility]   1.  Do you have a history of Heart attack, stroke, stent, coronary bypass surgery, or other heart surgery? No   2.  Do you ever have any pain or discomfort in your chest? No   3.  Do you have a history of  Heart Failure? No   4.   Are you troubled by shortness of breath when:  walking on a level surface, or up a slight hill, or at night? No   5.  Do you currently have a cold, bronchitis or other respiratory infection? No   6.  Do you have a cough, shortness of breath, or wheezing? No   7.  Do you sometimes get pains in the calves of your legs when you walk? No   8. Do you or anyone in your family have previous history of blood clots? No   9.  Do you or does anyone in your family have a serious bleeding problem such as prolonged bleeding following surgeries or cuts? No   10. Have you ever had problems with anemia or been told to take iron pills? No   11. Have you had any abnormal blood loss such as black, tarry or bloody stools, or abnormal vaginal bleeding? No   12. Have you ever had a blood transfusion? No   13. Have you or any of your relatives ever had problems with anesthesia? No   14. Do you have sleep apnea, excessive snoring or daytime drowsiness? No    15. Do you have any prosthetic heart valves? No   16. Do you have prosthetic joints? No   17. Is there any chance that you may be pregnant? No         HPI:     HPI related to upcoming procedure: she has large bunion of the left foot that is causing her significant pain.  She will undergo excision on .      See problem list for active medical problems.  Problems all longstanding and stable, except as noted/documented.  See ROS for pertinent symptoms related to these conditions.                                                                                                                                                          .    MEDICAL HISTORY:     Patient Active Problem List    Diagnosis Date Noted     Controlled substance agreement signed 2016     Priority: Medium     Chronic neck pain 11/15/2016     Priority: Medium     Essential hypertension, benign 2016     Priority: Medium     Allergic reaction 2016     Priority: Medium     To [place]  Airway closure       Mixed hyperlipidemia 2016     Priority: Medium     REBEKAH (generalized anxiety disorder) 2014     Priority: Medium     Attention deficit disorder, unspecified hyperactivity presence 08/15/2013     Priority: Medium     Obesity 2013     Priority: Medium     Menorrhagia 2012     Priority: Medium     Rash 2011     Priority: Medium     CARDIOVASCULAR SCREENING; LDL GOAL LESS THAN 160 10/31/2010     Priority: Medium      Past Medical History:   Diagnosis Date     ADD (attention deficit disorder)      Obesity      Past Surgical History:   Procedure Laterality Date     ARTHROPLASTY HIP  2017    left hip replaced     ARTHROSCOPIC REPAIR POSTERIOR CRUCIATE LIGAMENT        DELIVERY ONLY  10/1998     COLONOSCOPY  06/15/2018    Dr. So [facility]     Foot surgery - bone spurs  2002     GYN SURGERY       x 2     LAMINECT/DISCECTOMY, CERVICAL      C6-C7 Fusion.     Current Outpatient Prescriptions    Medication Sig Dispense Refill     ALPRAZolam (XANAX) 0.5 MG tablet TAKE 1 TABLET BY MOUTH DAILY AS NEEDED FOR ANXIETY 30 tablet 0     cetirizine (ZYRTEC) 10 MG tablet Take 1 tablet (10 mg) by mouth every evening 90 tablet 3     EPINEPHrine (EPIPEN/ADRENACLICK/OR ANY BX GENERIC EQUIV) 0.3 MG/0.3ML injection 2-pack Inject 0.3 mLs (0.3 mg) into the muscle once as needed for anaphylaxis (Patient not taking: Reported on 1/3/2018) 0.3 mL 1     fluticasone (FLONASE) 50 MCG/ACT nasal spray Spray 1-2 sprays into both nostrils daily (Patient taking differently: Spray 1-2 sprays into both nostrils daily as needed ) 48 g 3     gabapentin (NEURONTIN) 600 MG tablet TAKE ONE TABLET BY MOUTH 3 TIMES DAILY. 90 tablet 3     hydrochlorothiazide (MICROZIDE) 12.5 MG capsule Take 1 capsule (12.5 mg) by mouth daily 90 capsule 3     lisinopril (PRINIVIL/ZESTRIL) 20 MG tablet TAKE ONE TABLET BY MOUTH DAILY 90 tablet 3     methylphenidate (RITALIN) 20 MG tablet Take 1 tablet (20 mg) by mouth 2 times daily 60 tablet 0     mometasone (ELOCON) 0.1 % cream Apply sparingly to affected area twice daily as needed.  Do not apply to face. 45 g 0     simvastatin (ZOCOR) 20 MG tablet Take 1 tablet (20 mg) by mouth At Bedtime 90 tablet 2     OTC products: None, except as noted above    Allergies   Allergen Reactions     Bee Venom      Sulfa Drugs Hives      Latex Allergy: NO    Social History   Substance Use Topics     Smoking status: Never Smoker     Smokeless tobacco: Never Used     Alcohol use Yes      Comment: 5 beers a week     History   Drug Use No       REVIEW OF SYSTEMS:   GENERAL: negative for, fever, chills, weight loss, weight gain  EYES: negative  ENT: negative  RESPIRATORY: No dyspnea on exertion and No cough  CARDIOVASCULAR: negative for, palpitations, tachycardia, irregular heart beat and chest pain  GI: negative for, nausea, vomiting, hematemesis, melena and hematochezia  : dysuria and hematuria  MUSCULOSKELETAL: foot pain left due  to bunion, pain right top of foot  NEUROLOGIC: negative for, headaches, seizures, local weakness, numbness or tingling of hands and numbness or tingling of feet  SKIN: dermatitis stable left eyelid   ENDOCRINE: negative       EXAM:   There were no vitals taken for this visit.    Patient is alert, oriented, cooperative in no acute distress.  /72  Pulse 80  Temp 98.3  F (36.8  C) (Oral)  Resp 16  Wt 182 lb 9.6 oz (82.8 kg)  SpO2 96%  BMI 30.39 kg/m2    HEENT: PERRL, EOMI, TM's are normal. Oropharynx is clear.  NECK: No lymphadenopathy or thyromegaly. Carotid pulses full without bruits.  LUNGS: clear  CV: normal S1, S2 without murmur, S3 or S4 present. Pulses are 2/2 throughout. No JVD.  ABDOMEN: Bowel sounds present, nontender without hepatosplenomegaly. Liver is normal size to percussion.  EXTREMITIES: No edema, large bunion at the left first MTP joint, raised nodule in the dorsum of the right midfoot.  NEUROLOGIC: Cranial nerves II-XII intact, reflexes 2/4 throughout, strength 5/5, sensation grossly intact, gait normal.  SKIN: without rashes or significant lesions     DIAGNOSTICS:   EKG: appears normal, NSR, normal axis, normal intervals, no acute ST/T changes c/w ischemia, no LVH by voltage criteria, unchanged from previous tracings    Potassium: pending       Recent Labs   Lab Test  09/21/17   1713  08/02/17   0844   03/22/16   0752   06/24/10   0750   HGB  13.2   --    --   14.4   < >  13.6   PLT  238   --    --   287   < >   --    INR   --    --    --    --    --   0.91   NA  140  142   < >  140   < >  139   POTASSIUM  3.7  3.9   < >  4.4   < >  4.3   CR  0.92  1.02   < >  0.82   < >  1.07*    < > = values in this interval not displayed.        IMPRESSION:   Reason for surgery/procedure: bunion  Diagnosis/reason for consult: preop    The proposed surgical procedure is considered LOW risk.    REVISED CARDIAC RISK INDEX  The patient has the following serious cardiovascular risks for perioperative  complications such as (MI, PE, VFib and 3  AV Block):  No serious cardiac risks  INTERPRETATION: 0 risks: Class I (very low risk - 0.4% complication rate)    The patient has the following additional risks for perioperative complications:  No identified additional risks      ICD-10-CM    1. Preop general physical exam Z01.818 EKG 12-lead complete w/read - Clinics     Basic metabolic panel   2. Bunion, left M21.612 EKG 12-lead complete w/read - Clinics   3. History of bee sting allergy Z91.030 EPINEPHrine (EPIPEN/ADRENACLICK/OR ANY BX GENERIC EQUIV) 0.3 MG/0.3ML injection 2-pack   4. Essential hypertension, benign I10 Basic metabolic panel   5. Mixed hyperlipidemia E78.2 Lipid panel reflex to direct LDL Fasting       RECOMMENDATIONS:         She will hold all her medications morning surgery.  She will hold ACE inhibitor for 24 hours prior to the surgery.    APPROVAL GIVEN to proceed with proposed procedure, without further diagnostic evaluation       Signed Electronically by: Mae Hernandez MD    Copy of this evaluation report is provided to requesting physician.    [facility] Preop Guidelines    Revised Cardiac Risk Index

## 2018-07-06 ENCOUNTER — HOSPITAL ENCOUNTER (OUTPATIENT)
Facility: CLINIC | Age: 53
Discharge: HOME | End: 2018-07-06
Attending: PODIATRIST | Admitting: PODIATRIST
Payer: COMMERCIAL

## 2018-07-06 ENCOUNTER — APPOINTMENT (OUTPATIENT)
Dept: GENERAL RADIOLOGY | Facility: CLINIC | Age: 53
End: 2018-07-06
Attending: PODIATRIST
Payer: COMMERCIAL

## 2018-07-06 ENCOUNTER — SURGERY (OUTPATIENT)
Age: 53
End: 2018-07-06

## 2018-07-06 ENCOUNTER — ANESTHESIA EVENT (OUTPATIENT)
Dept: SURGERY | Facility: CLINIC | Age: 53
End: 2018-07-06
Payer: COMMERCIAL

## 2018-07-06 ENCOUNTER — ANESTHESIA (OUTPATIENT)
Dept: SURGERY | Facility: CLINIC | Age: 53
End: 2018-07-06
Payer: COMMERCIAL

## 2018-07-06 VITALS
RESPIRATION RATE: 18 BRPM | OXYGEN SATURATION: 95 % | HEIGHT: 65 IN | WEIGHT: 182.54 LBS | TEMPERATURE: 98.3 F | BODY MASS INDEX: 30.41 KG/M2 | SYSTOLIC BLOOD PRESSURE: 137 MMHG | DIASTOLIC BLOOD PRESSURE: 98 MMHG

## 2018-07-06 DIAGNOSIS — M20.12 HALLUX VALGUS (ACQUIRED), LEFT FOOT: Primary | ICD-10-CM

## 2018-07-06 PROCEDURE — 37000009 ZZH ANESTHESIA TECHNICAL FEE, EACH ADDTL 15 MIN: Performed by: PODIATRIST

## 2018-07-06 PROCEDURE — 36000060 ZZH SURGERY LEVEL 3 W FLUORO 1ST 30 MIN: Performed by: PODIATRIST

## 2018-07-06 PROCEDURE — 25000128 H RX IP 250 OP 636: Performed by: NURSE ANESTHETIST, CERTIFIED REGISTERED

## 2018-07-06 PROCEDURE — 25000128 H RX IP 250 OP 636: Performed by: PODIATRIST

## 2018-07-06 PROCEDURE — 71000027 ZZH RECOVERY PHASE 2 EACH 15 MINS: Performed by: PODIATRIST

## 2018-07-06 PROCEDURE — 40000306 ZZH STATISTIC PRE PROC ASSESS II: Performed by: PODIATRIST

## 2018-07-06 PROCEDURE — 27210794 ZZH OR GENERAL SUPPLY STERILE: Performed by: PODIATRIST

## 2018-07-06 PROCEDURE — 25000125 ZZHC RX 250: Performed by: PODIATRIST

## 2018-07-06 PROCEDURE — 25000125 ZZHC RX 250: Performed by: NURSE ANESTHETIST, CERTIFIED REGISTERED

## 2018-07-06 PROCEDURE — C1713 ANCHOR/SCREW BN/BN,TIS/BN: HCPCS | Performed by: PODIATRIST

## 2018-07-06 PROCEDURE — 25000128 H RX IP 250 OP 636: Performed by: ANESTHESIOLOGY

## 2018-07-06 PROCEDURE — 37000008 ZZH ANESTHESIA TECHNICAL FEE, 1ST 30 MIN: Performed by: PODIATRIST

## 2018-07-06 PROCEDURE — 40000278 XR SURGERY CARM FLUORO LESS THAN 5 MIN

## 2018-07-06 PROCEDURE — 25000132 ZZH RX MED GY IP 250 OP 250 PS 637: Performed by: PODIATRIST

## 2018-07-06 PROCEDURE — 36000058 ZZH SURGERY LEVEL 3 EA 15 ADDTL MIN: Performed by: PODIATRIST

## 2018-07-06 DEVICE — IMP SCR SYN CORTEX T8 STARDRIVE 2.7X18MM SELF TAP 202.878: Type: IMPLANTABLE DEVICE | Site: FOOT | Status: FUNCTIONAL

## 2018-07-06 DEVICE — IMP SCR SYN CORTEX T8 STARDRIVE 2.7X20MM SELF TAP 202.880: Type: IMPLANTABLE DEVICE | Site: FOOT | Status: FUNCTIONAL

## 2018-07-06 DEVICE — IMP WIRE KIRSCHNER 0.045X4" 78.2020: Type: IMPLANTABLE DEVICE | Site: FOOT | Status: FUNCTIONAL

## 2018-07-06 RX ORDER — LIDOCAINE HYDROCHLORIDE 20 MG/ML
INJECTION, SOLUTION EPIDURAL; INFILTRATION; INTRACAUDAL; PERINEURAL PRN
Status: DISCONTINUED | OUTPATIENT
Start: 2018-07-06 | End: 2018-07-06 | Stop reason: HOSPADM

## 2018-07-06 RX ORDER — PROPOFOL 10 MG/ML
INJECTION, EMULSION INTRAVENOUS CONTINUOUS PRN
Status: DISCONTINUED | OUTPATIENT
Start: 2018-07-06 | End: 2018-07-06

## 2018-07-06 RX ORDER — CEFAZOLIN SODIUM 2 G/100ML
2 INJECTION, SOLUTION INTRAVENOUS
Status: COMPLETED | OUTPATIENT
Start: 2018-07-06 | End: 2018-07-06

## 2018-07-06 RX ORDER — ONDANSETRON 2 MG/ML
INJECTION INTRAMUSCULAR; INTRAVENOUS PRN
Status: DISCONTINUED | OUTPATIENT
Start: 2018-07-06 | End: 2018-07-06

## 2018-07-06 RX ORDER — DIMENHYDRINATE 50 MG/ML
25 INJECTION, SOLUTION INTRAMUSCULAR; INTRAVENOUS
Status: DISCONTINUED | OUTPATIENT
Start: 2018-07-06 | End: 2018-07-06 | Stop reason: HOSPADM

## 2018-07-06 RX ORDER — HYDRALAZINE HYDROCHLORIDE 20 MG/ML
2.5-5 INJECTION INTRAMUSCULAR; INTRAVENOUS EVERY 10 MIN PRN
Status: DISCONTINUED | OUTPATIENT
Start: 2018-07-06 | End: 2018-07-06 | Stop reason: HOSPADM

## 2018-07-06 RX ORDER — FENTANYL CITRATE 50 UG/ML
25-50 INJECTION, SOLUTION INTRAMUSCULAR; INTRAVENOUS
Status: DISCONTINUED | OUTPATIENT
Start: 2018-07-06 | End: 2018-07-06 | Stop reason: HOSPADM

## 2018-07-06 RX ORDER — ONDANSETRON 2 MG/ML
4 INJECTION INTRAMUSCULAR; INTRAVENOUS EVERY 30 MIN PRN
Status: DISCONTINUED | OUTPATIENT
Start: 2018-07-06 | End: 2018-07-06 | Stop reason: HOSPADM

## 2018-07-06 RX ORDER — HYDROCODONE BITARTRATE AND ACETAMINOPHEN 5; 325 MG/1; MG/1
1-2 TABLET ORAL EVERY 4 HOURS PRN
Qty: 50 TABLET | Refills: 0 | Status: SHIPPED | OUTPATIENT
Start: 2018-07-06 | End: 2019-03-06

## 2018-07-06 RX ORDER — ONDANSETRON 4 MG/1
4 TABLET, ORALLY DISINTEGRATING ORAL EVERY 30 MIN PRN
Status: DISCONTINUED | OUTPATIENT
Start: 2018-07-06 | End: 2018-07-06 | Stop reason: HOSPADM

## 2018-07-06 RX ORDER — BUPIVACAINE HYDROCHLORIDE 5 MG/ML
INJECTION, SOLUTION PERINEURAL PRN
Status: DISCONTINUED | OUTPATIENT
Start: 2018-07-06 | End: 2018-07-06 | Stop reason: HOSPADM

## 2018-07-06 RX ORDER — FENTANYL CITRATE 50 UG/ML
INJECTION, SOLUTION INTRAMUSCULAR; INTRAVENOUS PRN
Status: DISCONTINUED | OUTPATIENT
Start: 2018-07-06 | End: 2018-07-06

## 2018-07-06 RX ORDER — SODIUM CHLORIDE, SODIUM LACTATE, POTASSIUM CHLORIDE, CALCIUM CHLORIDE 600; 310; 30; 20 MG/100ML; MG/100ML; MG/100ML; MG/100ML
INJECTION, SOLUTION INTRAVENOUS CONTINUOUS
Status: DISCONTINUED | OUTPATIENT
Start: 2018-07-06 | End: 2018-07-06 | Stop reason: HOSPADM

## 2018-07-06 RX ORDER — MEPERIDINE HYDROCHLORIDE 25 MG/ML
12.5 INJECTION INTRAMUSCULAR; INTRAVENOUS; SUBCUTANEOUS
Status: DISCONTINUED | OUTPATIENT
Start: 2018-07-06 | End: 2018-07-06 | Stop reason: HOSPADM

## 2018-07-06 RX ORDER — NALOXONE HYDROCHLORIDE 0.4 MG/ML
.1-.4 INJECTION, SOLUTION INTRAMUSCULAR; INTRAVENOUS; SUBCUTANEOUS
Status: DISCONTINUED | OUTPATIENT
Start: 2018-07-06 | End: 2018-07-06 | Stop reason: HOSPADM

## 2018-07-06 RX ORDER — KETAMINE HYDROCHLORIDE 10 MG/ML
INJECTION INTRAMUSCULAR; INTRAVENOUS PRN
Status: DISCONTINUED | OUTPATIENT
Start: 2018-07-06 | End: 2018-07-06

## 2018-07-06 RX ORDER — LABETALOL HYDROCHLORIDE 5 MG/ML
10 INJECTION, SOLUTION INTRAVENOUS
Status: DISCONTINUED | OUTPATIENT
Start: 2018-07-06 | End: 2018-07-06 | Stop reason: HOSPADM

## 2018-07-06 RX ORDER — CEFAZOLIN SODIUM 1 G/3ML
1 INJECTION, POWDER, FOR SOLUTION INTRAMUSCULAR; INTRAVENOUS SEE ADMIN INSTRUCTIONS
Status: DISCONTINUED | OUTPATIENT
Start: 2018-07-06 | End: 2018-07-06 | Stop reason: HOSPADM

## 2018-07-06 RX ORDER — PROPOFOL 10 MG/ML
INJECTION, EMULSION INTRAVENOUS PRN
Status: DISCONTINUED | OUTPATIENT
Start: 2018-07-06 | End: 2018-07-06

## 2018-07-06 RX ORDER — HYDROCODONE BITARTRATE AND ACETAMINOPHEN 5; 325 MG/1; MG/1
2 TABLET ORAL
Status: COMPLETED | OUTPATIENT
Start: 2018-07-06 | End: 2018-07-06

## 2018-07-06 RX ORDER — HYDROMORPHONE HYDROCHLORIDE 1 MG/ML
.3-.5 INJECTION, SOLUTION INTRAMUSCULAR; INTRAVENOUS; SUBCUTANEOUS EVERY 10 MIN PRN
Status: DISCONTINUED | OUTPATIENT
Start: 2018-07-06 | End: 2018-07-06 | Stop reason: HOSPADM

## 2018-07-06 RX ADMIN — PROPOFOL 20 MG: 10 INJECTION, EMULSION INTRAVENOUS at 12:19

## 2018-07-06 RX ADMIN — PROPOFOL 40 MG: 10 INJECTION, EMULSION INTRAVENOUS at 12:25

## 2018-07-06 RX ADMIN — FENTANYL CITRATE 50 MCG: 50 INJECTION, SOLUTION INTRAMUSCULAR; INTRAVENOUS at 12:30

## 2018-07-06 RX ADMIN — LIDOCAINE HYDROCHLORIDE 3 ML: 20 INJECTION, SOLUTION EPIDURAL; INFILTRATION; INTRACAUDAL; PERINEURAL at 12:54

## 2018-07-06 RX ADMIN — CEFAZOLIN SODIUM 2 G: 2 INJECTION, SOLUTION INTRAVENOUS at 12:24

## 2018-07-06 RX ADMIN — BUPIVACAINE HYDROCHLORIDE 15 ML: 5 INJECTION, SOLUTION PERINEURAL at 14:21

## 2018-07-06 RX ADMIN — MIDAZOLAM 2 MG: 1 INJECTION INTRAMUSCULAR; INTRAVENOUS at 12:17

## 2018-07-06 RX ADMIN — FENTANYL CITRATE 50 MCG: 50 INJECTION, SOLUTION INTRAMUSCULAR; INTRAVENOUS at 12:22

## 2018-07-06 RX ADMIN — FENTANYL CITRATE 50 MCG: 50 INJECTION INTRAMUSCULAR; INTRAVENOUS at 14:53

## 2018-07-06 RX ADMIN — ONDANSETRON 4 MG: 2 INJECTION INTRAMUSCULAR; INTRAVENOUS at 13:14

## 2018-07-06 RX ADMIN — PROPOFOL 75 MCG/KG/MIN: 10 INJECTION, EMULSION INTRAVENOUS at 12:27

## 2018-07-06 RX ADMIN — KETAMINE HYDROCHLORIDE 20 MG: 10 INJECTION, SOLUTION INTRAMUSCULAR; INTRAVENOUS at 12:51

## 2018-07-06 RX ADMIN — PROPOFOL 40 MG: 10 INJECTION, EMULSION INTRAVENOUS at 12:23

## 2018-07-06 RX ADMIN — HYDROCODONE BITARTRATE AND ACETAMINOPHEN 2 TABLET: 5; 325 TABLET ORAL at 15:28

## 2018-07-06 RX ADMIN — PROPOFOL 40 MG: 10 INJECTION, EMULSION INTRAVENOUS at 12:21

## 2018-07-06 RX ADMIN — PROPOFOL 40 MG: 10 INJECTION, EMULSION INTRAVENOUS at 12:24

## 2018-07-06 RX ADMIN — SODIUM CHLORIDE, POTASSIUM CHLORIDE, SODIUM LACTATE AND CALCIUM CHLORIDE: 600; 310; 30; 20 INJECTION, SOLUTION INTRAVENOUS at 11:19

## 2018-07-06 RX ADMIN — FENTANYL CITRATE 50 MCG: 50 INJECTION INTRAMUSCULAR; INTRAVENOUS at 14:26

## 2018-07-06 RX ADMIN — PROPOFOL 20 MG: 10 INJECTION, EMULSION INTRAVENOUS at 12:22

## 2018-07-06 ASSESSMENT — ENCOUNTER SYMPTOMS
STRIDOR: 0
DYSRHYTHMIAS: 0
SEIZURES: 0

## 2018-07-06 ASSESSMENT — COPD QUESTIONNAIRES: COPD: 0

## 2018-07-06 ASSESSMENT — LIFESTYLE VARIABLES: TOBACCO_USE: 0

## 2018-07-06 NOTE — ANESTHESIA POSTPROCEDURE EVALUATION
Patient: Jayleen Lang    Procedure(s):  Bunionectomy with Akin Osteotomy Left Foot - Wound Class: I-Clean   - Wound Class: I-Clean    Diagnosis:Bunion   Diagnosis Additional Information: Hallux valgus, left foot    Anesthesia Type:  MAC    Note:  Anesthesia Post Evaluation    Patient location during evaluation: PACU  Patient participation: Able to fully participate in evaluation  Level of consciousness: awake and alert  Pain management: adequate  Airway patency: patent  Cardiovascular status: acceptable  Respiratory status: acceptable  Hydration status: acceptable  PONV: none     Anesthetic complications: None          Last vitals:  Vitals:    07/06/18 1426 07/06/18 1430 07/06/18 1436   BP:  (!) 173/117 (!) 152/92   Resp:  18    Temp:  97  F (36.1  C)    SpO2: 98%           Electronically Signed By: [place] Fernando Zavala MD  July 6, 2018  2:50 PM

## 2018-07-06 NOTE — BRIEF OP NOTE
[facility] Brief Operative Note    Pre-operative diagnosis: Bunion let foot   Post-operative diagnosis same   Procedure: Procedure(s):  Bunionectomy with Akin Osteotomy Left Foot - Wound Class: I-Clean   - Wound Class: I-Clean   Surgeon(s): Surgeon(s) and Role:     * Eliezer Parra DPM - Primary   Estimated blood loss: * No values recorded between 7/6/2018 12:34 PM and 7/6/2018  2:11 PM *    Specimens: * No specimens in log *   Findings: Hallux valgus left

## 2018-07-06 NOTE — ANESTHESIA CARE TRANSFER NOTE
Patient: Jayleen Lang    Procedure(s):  Bunionectomy with Akin Osteotomy Left Foot - Wound Class: I-Clean   - Wound Class: I-Clean    Diagnosis: Bunion   Diagnosis Additional Information: No value filed.    Anesthesia Type:   MAC     Note:  Airway :Room Air  Patient transferred to:Phase II  Comments: Pt spon resp, VSS, monitors on tx to Phase 2 room H and report to RNHandoff Report: Identifed the Patient, Identified the Reponsible Provider, Reviewed the pertinent medical history, Discussed the surgical course, Reviewed Intra-OP anesthesia mangement and issues during anesthesia, Set expectations for post-procedure period and Allowed opportunity for questions and acknowledgement of understanding      Vitals: (Last set prior to Anesthesia Care Transfer)    CRNA VITALS  7/6/2018 1342 - 7/6/2018 1419      7/6/2018             NIBP: 166/89    NIBP Mean: 121                Electronically Signed By: SPENCER Awad CRNA  July 6, 2018  2:19 PM

## 2018-07-06 NOTE — IP AVS SNAPSHOT
MRN:[id number]                      After Visit Summary   7/6/2018    Jayleen Lang    MRN: [id number]           Thank you!     Thank you for choosing [facility] for your care. Our goal is always to provide you with excellent care. Hearing back from our patients is one way we can continue to improve our services. Please take a few minutes to complete the written survey that you may receive in the mail after you visit. If you would like to speak to someone directly about your visit please contact Patient Relations at [phone]. Thank you!          Patient Information     Date Of Birth          1965        About your hospital stay     You were admitted on:  July 6, 2018 You last received care in the:  [facility] PreOP/PostOP    You were discharged on:  July 6, 2018       Who to Call     For medical emergencies, please call 911.  For non-urgent questions about your medical care, please call your primary care provider or clinic, [phone]  For questions related to your surgery, please call your surgery clinic        Attending Provider     Provider Eliezer Fenton DPM Podiatry       Primary Care Provider Office Phone # Fax #    [place] [phone]      After Care Instructions     Discharge Instructions       Call Dr. Parra cell, [phone], with questions or concerns over the weekend.    You have an appt on Wed July 11 at 9:15 AM in the [place] office.  ([address], [address], [place] [address])            Dressing       Keep dressing clean and dry.  Dressing / incisional care as instructed by RN and or Surgeon            No weight bearing           Shower       No showers. Take bath and stick foot out of tub.                  Further instructions from your care team       HOME CARE FOLLOWING MINOR SURGERY        DRESSING  Keep dressing dry and in place until your doctor instructs you to remove the dressing.    DRAINAGE  There should  be minimal drainage. If bleeding should occur and soaks through the dressing apply a sterile, dry dressing over it and tape in place. If bleeding persists, apply gentle, steady pressure with your hand over the dressing for 5 minutes. If the bleeding does not stop, call your doctor.    SKIN CLOSURE  You may have stitches or special skin closures. You will be given an appointment for the removal of any external stitches. You may have stitches under the skin which will absorb. You may have steri-strips over the incision. These look like thin tapes and will peel off in 5-7 days. If they don t after 7 days, you may carefully remove them. You may shower with the steri-strips but do not soak them, as with swimming or taking a bath. A protective covering of plastic may be placed over external stitches for showering.    NOTIFY YOUR PHYSICIAN IF YOU HAVE ANY OF THE FOLLOWING SYMPTOMS:    1. Fever  2. Excessive bleeding or drainage  3. Disruption of the skin closure  4. Swelling, redness or excessive tenderness at the site  5. Severe pain  6. Drainage that is green, yellow, thick white or has a bad odor    Caring For Your Cast     This information was prepared for you to help you take care of your cast. Please read each section carefully and ask your physician if you have questions.    HOW TO CARE FOR YOUR CAST    If your cast is made of plaster of Zohreh and it becomes soiled, clean using a damp cloth with dry cleanser or use white shoe polish sparingly. If your cast is made of fiberglass, clean with a damp cloth with a small amount of mild soap.    Avoid bumping or knocking the cast against any hard object. Cover rough areas with tape.    Never trim or cut down the length of your cast. Please call your physician if the cast becomes loose, broken or cracked.    If skin under the cast begins to itch, do not use anything to scratch under the cast since it may break the skin and cause an infection. Parents should be alert to  prevent children from sticking forks, sticks or other objects inside the cast.    If your physician orders a cast boot, be sure to wear it.    You may wash areas around the cast, but do not saturate the cast in the process. Some coverings can be used to protect your cast. Please ask you physician to recommend one.  WHAT TO CHECK FOR    Check your fingers or toes for color changes, swelling, loss of motion, numbness, tingling or severe pain. In infants or young children, check for crying that cannot be soothed by usual methods. Call your physician if these symptoms occur.    If swelling is noted during the first 24 to 48 hours, elevate the extremity higher than your head. After this time, elevate it whenever you are in bed.    Check cast for undesirable odors or drainage. Also check for any areas of local pain under your cast. These may be signs of an area of pressure under the cast.    Be sure any padding used is well anchored to the cast. Be careful not to pull padding out. Without the padding. Loose material may slip into cast and cause irritation.    WHEN TO CALL YOUR PHYSICIAN  You should call IF you notice:    A bluish color, increased swelling, loss of motion, increased numbness/tingling or severe pain of fingers or toes.    Unusually foul odor from cast.    Unusual drainage (blood is expected if there has been surgery).    Broken, cracked or very loose cast.    Localized pain under cast.  CAST REMOVAL AND POST CAST CARE    A specially designed saw will be used to remove your cast. Cast removal is fast and painless.    Use of skin lotion or bathing with bath oil may eliminate some of the dry, flaky skin which is present after your cast is removed. Do not scrub your skin since this may cause skin breakdown.     Elevate your extremity if you notice any swelling after your cast is removed.    Your arm or leg may appear thinner and lighter because you haven t been using it. Your physician will determine how much  "physical activity is advisable following removal of your cast.    REMEMBER: Cast care must continue as long as your cast is on.   GENERAL ANESTHESIA OR SEDATION ADULT DISCHARGE INSTRUCTIONS   SPECIAL PRECAUTIONS FOR 24 HOURS AFTER SURGERY    IT IS NOT UNUSUAL TO FEEL LIGHT-HEADED OR FAINT, UP TO 24 HOURS AFTER SURGERY OR WHILE TAKING PAIN MEDICATION.  IF YOU HAVE THESE SYMPTOMS; SIT FOR A FEW MINUTES BEFORE STANDING AND HAVE SOMEONE ASSIST YOU WHEN YOU GET UP TO WALK OR USE THE BATHROOM.    YOU SHOULD REST AND RELAX FOR THE NEXT 24 HOURS AND YOU MUST MAKE ARRANGEMENTS TO HAVE SOMEONE STAY WITH YOU FOR AT LEAST 24 HOURS AFTER YOUR DISCHARGE.  AVOID HAZARDOUS AND STRENUOUS ACTIVITIES.  DO NOT MAKE IMPORTANT DECISIONS FOR 24 HOURS.    DO NOT DRIVE ANY VEHICLE OR OPERATE MECHANICAL EQUIPMENT FOR 24 HOURS FOLLOWING THE END OF YOUR SURGERY.  EVEN THOUGH YOU MAY FEEL NORMAL, YOUR REACTIONS MAY BE AFFECTED BY THE MEDICATION YOU HAVE RECEIVED.    DO NOT DRINK ALCOHOLIC BEVERAGES FOR 24 HOURS FOLLOWING YOUR SURGERY.    DRINK CLEAR LIQUIDS (APPLE JUICE, GINGER ALE, 7-UP, BROTH, ETC.).  PROGRESS TO YOUR REGULAR DIET AS YOU FEEL ABLE.    YOU MAY HAVE A DRY MOUTH, A SORE THROAT, MUSCLES ACHES OR TROUBLE SLEEPING.  THESE SHOULD GO AWAY AFTER 24 HOURS.    CALL YOUR DOCTOR FOR ANY OF THE FOLLOWING:  SIGNS OF INFECTION (FEVER, GROWING TENDERNESS AT THE SURGERY SITE, A LARGE AMOUNT OF DRAINAGE OR BLEEDING, SEVERE PAIN, FOUL-SMELLING DRAINAGE, REDNESS OR SWELLING.    IT HAS BEEN OVER 8 TO 10 HOURS SINCE SURGERY AND YOU ARE STILL NOT ABLE TO URINATE (PASS WATER).           Pending Results     No orders found from 7/4/2018 to 7/7/2018.            Admission Information     Date & Time Provider Department Dept. Phone    7/6/2018 Eliezer Parra DPM [facility] PreOP/PostOP [phone]      Your Vitals Were     Blood Pressure Temperature Respirations Height Weight Last Period    152/92 97  F (36.1  C) (Temporal) 18 1.651 m (5' 5\") 82.8 " "kg (182 lb 8.7 oz) (LMP Unknown)    Pulse Oximetry BMI (Body Mass Index)                98% 30.38 kg/m2          [facility] Information     [facility] lets you send messages to your doctor, view your test results, renew your prescriptions, schedule appointments and more. To sign up, go to [email or web address] . Click on \"Log in\" on the left side of the screen, which will take you to the Welcome page. Then click on \"Sign up Now\" on the right side of the page.     You will be asked to enter the access code listed below, as well as some personal information. Please follow the directions to create your username and password.     Your access code is: [email or web address]  Expires: 9/10/2018  1:38 PM     Your access code will  in 90 days. If you need help or a new code, please call your [place] clinic or [phone].        Care EveryWhere ID     This is your Care EveryWhere ID. This could be used by other organizations to access your [place] medical records  [id number]        Equal Access to Services     [facility] : Hadii yinka Lindsay, waamberda salvatore, qaybrowena kaalamor tong, mendez armstrong. So [facility] [phone].    ATENCIÓN: Si habla español, tiene a villagomez disposición servicios gratuitos de asistencia lingüística. Natalie al [phone].    We comply with applicable federal civil rights laws and [place] laws. We do not discriminate on the basis of race, color, national origin, age, disability, sex, sexual orientation, or gender identity.               Review of your medicines      START taking        Dose / Directions    HYDROcodone-acetaminophen 5-325 MG per tablet   Commonly known as:  NORCO   Used for:  Hallux valgus (acquired), left foot        Dose:  1-2 tablet   Take 1-2 tablets by mouth every 4 hours as needed for other (Moderate to Severe Pain)   Quantity:  50 tablet   Refills:  0         CONTINUE these medicines which may have CHANGED, or have new prescriptions. If we " are uncertain of the size of tablets/capsules you have at home, strength may be listed as something that might have changed.        Dose / Directions    fluticasone 50 MCG/ACT spray   Commonly known as:  FLONASE   This may have changed:    - when to take this  - reasons to take this   Used for:  Pollen allergies        Dose:  1-2 spray   Spray 1-2 sprays into both nostrils daily   Quantity:  48 g   Refills:  3         CONTINUE these medicines which have NOT CHANGED        Dose / Directions    ALPRAZolam 0.5 MG tablet   Commonly known as:  XANAX   Used for:  REBEKAH (generalized anxiety disorder)        TAKE 1 TABLET BY MOUTH DAILY AS NEEDED FOR ANXIETY   Quantity:  30 tablet   Refills:  0       cetirizine 10 MG tablet   Commonly known as:  zyrTEC   Used for:  Chronic seasonal allergic rhinitis, unspecified trigger        Dose:  10 mg   Take 1 tablet (10 mg) by mouth every evening   Quantity:  90 tablet   Refills:  3       EPINEPHrine 0.3 MG/0.3ML injection 2-pack   Commonly known as:  EPIPEN/ADRENACLICK/or ANY BX GENERIC EQUIV   Used for:  History of bee sting allergy        Dose:  0.3 mg   Inject 0.3 mLs (0.3 mg) into the muscle once as needed for anaphylaxis   Quantity:  0.3 mL   Refills:  1       gabapentin 600 MG tablet   Commonly known as:  NEURONTIN   Used for:  Neuropathy        TAKE ONE TABLET BY MOUTH 3 TIMES DAILY.   Quantity:  90 tablet   Refills:  3       hydrochlorothiazide 12.5 MG capsule   Commonly known as:  MICROZIDE   Used for:  Essential hypertension        Dose:  12.5 mg   Take 1 capsule (12.5 mg) by mouth daily   Quantity:  90 capsule   Refills:  3       lisinopril 20 MG tablet   Commonly known as:  PRINIVIL/ZESTRIL   Used for:  Essential hypertension        TAKE ONE TABLET BY MOUTH DAILY   Quantity:  90 tablet   Refills:  3       methylphenidate 20 MG tablet   Commonly known as:  RITALIN   Used for:  Attention deficit disorder, unspecified hyperactivity presence        Dose:  20 mg   Take 1 tablet  (20 mg) by mouth 2 times daily   Quantity:  60 tablet   Refills:  0       mometasone 0.1 % cream   Commonly known as:  ELOCON   Used for:  Angular cheilitis        Apply sparingly to affected area twice daily as needed.  Do not apply to face.   Quantity:  45 g   Refills:  0       MULTIPLE VITAMIN PO        Dose:  1 tablet   Take 1 tablet by mouth daily   Refills:  0       simvastatin 20 MG tablet   Commonly known as:  ZOCOR   Used for:  Mixed hyperlipidemia        Dose:  20 mg   Take 1 tablet (20 mg) by mouth At Bedtime   Quantity:  90 tablet   Refills:  2            Where to get your medicines      Some of these will need a paper prescription and others can be bought over the counter. Ask your nurse if you have questions.     Bring a paper prescription for each of these medications     HYDROcodone-acetaminophen 5-325 MG per tablet                Protect others around you: Learn how to safely use, store and throw away your medicines at www.disposemymeds.org.        Information about OPIOIDS     PRESCRIPTION OPIOIDS: WHAT YOU NEED TO KNOW   We gave you an opioid (narcotic) pain medicine. It is important to manage your pain, but opioids are not always the best choice. You should first try all the other options your care team gave you. Take this medicine for as short a time (and as few doses) as possible.     These medicines have risks:    DO NOT drive when on new or higher doses of pain medicine. These medicines can affect your alertness and reaction times, and you could be arrested for driving under the influence (DUI). If you need to use opioids long-term, talk to your care team about driving.    DO NOT operate heave machinery    DO NOT do any other dangerous activities while taking these medicines.     DO NOT drink any alcohol while taking these medicines.      If the opioid prescribed includes acetaminophen, DO NOT take with any other medicines that contain acetaminophen. Read all labels carefully. Look for the  word  acetaminophen  or  Tylenol.  Ask your pharmacist if you have questions or are unsure.    You can get addicted to pain medicines, especially if you have a history of addiction (chemical, alcohol or substance dependence). Talk to your care team about ways to reduce this risk.    Store your pills in a secure place, locked if possible. We will not replace any lost or stolen medicine. If you don t finish your medicine, please throw away (dispose) as directed by your pharmacist. The [place] Pollution Control Agency has more information about safe disposal: https://www.pca.[facility].[place].us/living-green/managing-unwanted-medications.     All opioids tend to cause constipation. Drink plenty of water and eat foods that have a lot of fiber, such as fruits, vegetables, prune juice, apple juice and high-fiber cereal. Take a laxative (Miralax, milk of magnesia, Colace, Senna) if you don t move your bowels at least every other day.              Medication List: This is a list of all your medications and when to take them. Check marks below indicate your daily home schedule. Keep this list as a reference.      Medications           Morning Afternoon Evening Bedtime As Needed    ALPRAZolam 0.5 MG tablet   Commonly known as:  XANAX   TAKE 1 TABLET BY MOUTH DAILY AS NEEDED FOR ANXIETY                                cetirizine 10 MG tablet   Commonly known as:  zyrTEC   Take 1 tablet (10 mg) by mouth every evening                                EPINEPHrine 0.3 MG/0.3ML injection 2-pack   Commonly known as:  EPIPEN/ADRENACLICK/or ANY BX GENERIC EQUIV   Inject 0.3 mLs (0.3 mg) into the muscle once as needed for anaphylaxis                                fluticasone 50 MCG/ACT spray   Commonly known as:  FLONASE   Spray 1-2 sprays into both nostrils daily                                gabapentin 600 MG tablet   Commonly known as:  NEURONTIN   TAKE ONE TABLET BY MOUTH 3 TIMES DAILY.                                hydrochlorothiazide  12.5 MG capsule   Commonly known as:  MICROZIDE   Take 1 capsule (12.5 mg) by mouth daily                                HYDROcodone-acetaminophen 5-325 MG per tablet   Commonly known as:  NORCO   Take 1-2 tablets by mouth every 4 hours as needed for other (Moderate to Severe Pain)                                lisinopril 20 MG tablet   Commonly known as:  PRINIVIL/ZESTRIL   TAKE ONE TABLET BY MOUTH DAILY                                methylphenidate 20 MG tablet   Commonly known as:  RITALIN   Take 1 tablet (20 mg) by mouth 2 times daily                                mometasone 0.1 % cream   Commonly known as:  ELOCON   Apply sparingly to affected area twice daily as needed.  Do not apply to face.                                MULTIPLE VITAMIN PO   Take 1 tablet by mouth daily                                simvastatin 20 MG tablet   Commonly known as:  ZOCOR   Take 1 tablet (20 mg) by mouth At Bedtime

## 2018-07-06 NOTE — ANESTHESIA PREPROCEDURE EVALUATION
Anesthesia Evaluation     . Pt has had prior anesthetic. Type: General    No history of anesthetic complications          ROS/MED HX    ENT/Pulmonary:     (+)NAHID risk factors hypertension, , . .   (-) tobacco use, asthma, COPD and recent URI   Neurologic:     (+)other neuro ADD   (-) seizures and CVA   Cardiovascular:     (+) Dyslipidemia, hypertension----. : . . . :. . Previous cardiac testing date:results:date: results:ECG reviewed date:6/18 results:NSR date: results:         (-) CAD, arrhythmias and valvular problems/murmurs   METS/Exercise Tolerance:     Hematologic: Comments: Lab Test        09/21/17     03/22/16     10/22/15      --          06/24/10                       1713          0752          0720           --           0750          WBC          6.9          6.3          6.0            < >         --           HGB          13.2         14.4         15.1           < >        13.6          MCV          91           87           86             < >         --           PLT          238          287          318            < >         --           INR           --           --           --           --          0.91           < > = values in this interval not displayed.                  Lab Test        07/03/18 09/21/17 08/02/17                       0824          1713          0844          NA           141          140          142           POTASSIUM    3.8          3.7          3.9           CHLORIDE     105          103          105           CO2          29           28           29            BUN          20           20           16            CR           0.91         0.92         1.02          ANIONGAP     7            9            8             ROLANDO          8.9          9.2          9.4           GLC          93           88           91           - neg hematologic  ROS       Musculoskeletal:  - neg musculoskeletal ROS       GI/Hepatic:  - neg GI/hepatic ROS      (-) GERD, hepatitis and  liver disease   Renal/Genitourinary:  - ROS Renal section negative       Endo:     (+) Obesity, .   (-) Type I DM, Type II DM, thyroid disease and chronic steroid usage   Psychiatric:     (+) psychiatric history anxiety      Infectious Disease:  - neg infectious disease ROS       Malignancy:      - no malignancy   Other:    - neg other ROS                 Physical Exam  Normal systems: cardiovascular, pulmonary and dental    Airway   Mallampati: I  TM distance: >3 FB  Neck ROM: full    Dental     Cardiovascular   Rhythm and rate: regular and normal  (-) no friction rub, no systolic click and no murmur    Pulmonary    breath sounds clear to auscultation(-) no rhonchi, no decreased breath sounds, no wheezes, no rales and no stridor                    Anesthesia Plan      History & Physical Review  History and physical reviewed and following examination; no interval change.    ASA Status:  2 .    NPO Status:  > 8 hours    Plan for MAC Reason for MAC:  Deep or markedly invasive procedure (G8)         Postoperative Care  Postoperative pain management:  IV analgesics.      Consents  Anesthetic plan, risks, benefits and alternatives discussed with:  Patient..                          .

## 2018-07-06 NOTE — DISCHARGE INSTRUCTIONS
HOME CARE FOLLOWING MINOR SURGERY        DRESSING  Keep dressing dry and in place until your doctor instructs you to remove the dressing.    DRAINAGE  There should be minimal drainage. If bleeding should occur and soaks through the dressing apply a sterile, dry dressing over it and tape in place. If bleeding persists, apply gentle, steady pressure with your hand over the dressing for 5 minutes. If the bleeding does not stop, call your doctor.    SKIN CLOSURE  You may have stitches or special skin closures. You will be given an appointment for the removal of any external stitches. You may have stitches under the skin which will absorb. You may have steri-strips over the incision. These look like thin tapes and will peel off in 5-7 days. If they don t after 7 days, you may carefully remove them. You may shower with the steri-strips but do not soak them, as with swimming or taking a bath. A protective covering of plastic may be placed over external stitches for showering.    NOTIFY YOUR PHYSICIAN IF YOU HAVE ANY OF THE FOLLOWING SYMPTOMS:    1. Fever  2. Excessive bleeding or drainage  3. Disruption of the skin closure  4. Swelling, redness or excessive tenderness at the site  5. Severe pain  6. Drainage that is green, yellow, thick white or has a bad odor    Caring For Your Cast     This information was prepared for you to help you take care of your cast. Please read each section carefully and ask your physician if you have questions.    HOW TO CARE FOR YOUR CAST    If your cast is made of plaster of Zohreh and it becomes soiled, clean using a damp cloth with dry cleanser or use white shoe polish sparingly. If your cast is made of fiberglass, clean with a damp cloth with a small amount of mild soap.    Avoid bumping or knocking the cast against any hard object. Cover rough areas with tape.    Never trim or cut down the length of your cast. Please call your physician if the cast becomes loose, broken or cracked.    If  skin under the cast begins to itch, do not use anything to scratch under the cast since it may break the skin and cause an infection. Parents should be alert to prevent children from sticking forks, sticks or other objects inside the cast.    If your physician orders a cast boot, be sure to wear it.    You may wash areas around the cast, but do not saturate the cast in the process. Some coverings can be used to protect your cast. Please ask you physician to recommend one.  WHAT TO CHECK FOR    Check your fingers or toes for color changes, swelling, loss of motion, numbness, tingling or severe pain. In infants or young children, check for crying that cannot be soothed by usual methods. Call your physician if these symptoms occur.    If swelling is noted during the first 24 to 48 hours, elevate the extremity higher than your head. After this time, elevate it whenever you are in bed.    Check cast for undesirable odors or drainage. Also check for any areas of local pain under your cast. These may be signs of an area of pressure under the cast.    Be sure any padding used is well anchored to the cast. Be careful not to pull padding out. Without the padding. Loose material may slip into cast and cause irritation.    WHEN TO CALL YOUR PHYSICIAN  You should call IF you notice:    A bluish color, increased swelling, loss of motion, increased numbness/tingling or severe pain of fingers or toes.    Unusually foul odor from cast.    Unusual drainage (blood is expected if there has been surgery).    Broken, cracked or very loose cast.    Localized pain under cast.  CAST REMOVAL AND POST CAST CARE    A specially designed saw will be used to remove your cast. Cast removal is fast and painless.    Use of skin lotion or bathing with bath oil may eliminate some of the dry, flaky skin which is present after your cast is removed. Do not scrub your skin since this may cause skin breakdown.     Elevate your extremity if you notice any  swelling after your cast is removed.    Your arm or leg may appear thinner and lighter because you haven t been using it. Your physician will determine how much physical activity is advisable following removal of your cast.    REMEMBER: Cast care must continue as long as your cast is on.   GENERAL ANESTHESIA OR SEDATION ADULT DISCHARGE INSTRUCTIONS   SPECIAL PRECAUTIONS FOR 24 HOURS AFTER SURGERY    IT IS NOT UNUSUAL TO FEEL LIGHT-HEADED OR FAINT, UP TO 24 HOURS AFTER SURGERY OR WHILE TAKING PAIN MEDICATION.  IF YOU HAVE THESE SYMPTOMS; SIT FOR A FEW MINUTES BEFORE STANDING AND HAVE SOMEONE ASSIST YOU WHEN YOU GET UP TO WALK OR USE THE BATHROOM.    YOU SHOULD REST AND RELAX FOR THE NEXT 24 HOURS AND YOU MUST MAKE ARRANGEMENTS TO HAVE SOMEONE STAY WITH YOU FOR AT LEAST 24 HOURS AFTER YOUR DISCHARGE.  AVOID HAZARDOUS AND STRENUOUS ACTIVITIES.  DO NOT MAKE IMPORTANT DECISIONS FOR 24 HOURS.    DO NOT DRIVE ANY VEHICLE OR OPERATE MECHANICAL EQUIPMENT FOR 24 HOURS FOLLOWING THE END OF YOUR SURGERY.  EVEN THOUGH YOU MAY FEEL NORMAL, YOUR REACTIONS MAY BE AFFECTED BY THE MEDICATION YOU HAVE RECEIVED.    DO NOT DRINK ALCOHOLIC BEVERAGES FOR 24 HOURS FOLLOWING YOUR SURGERY.    DRINK CLEAR LIQUIDS (APPLE JUICE, GINGER ALE, 7-UP, BROTH, ETC.).  PROGRESS TO YOUR REGULAR DIET AS YOU FEEL ABLE.    YOU MAY HAVE A DRY MOUTH, A SORE THROAT, MUSCLES ACHES OR TROUBLE SLEEPING.  THESE SHOULD GO AWAY AFTER 24 HOURS.    CALL YOUR DOCTOR FOR ANY OF THE FOLLOWING:  SIGNS OF INFECTION (FEVER, GROWING TENDERNESS AT THE SURGERY SITE, A LARGE AMOUNT OF DRAINAGE OR BLEEDING, SEVERE PAIN, FOUL-SMELLING DRAINAGE, REDNESS OR SWELLING.    IT HAS BEEN OVER 8 TO 10 HOURS SINCE SURGERY AND YOU ARE STILL NOT ABLE TO URINATE (PASS WATER).

## 2018-07-06 NOTE — OP NOTE
Procedure Date: 07/06/2018      SURGEON:  Eliezer Parra DPM      ASSISTANT:  None.      PREOPERATIVE DIAGNOSIS:  Hallux valgus, left foot.      POSTOPERATIVE DIAGNOSIS:  Hallux valgus, left foot.      PROCEDURES:   1.  Closing base osteotomy, first metatarsal, left foot.   2.  Akin osteotomy, left great toe.      ANESTHESIA:  MAC.      DESCRIPTION OF PROCEDURE:  The patient presents to the hospital having been n.p.o. since midnight.  History and physical and all other preop studies were reviewed and there are no contraindications to the proposed procedure.  While in the preop holding area, the appropriate IV was instituted and the patient was taken to the OR in the supine position where additional anesthetics were administered.  This was supplemented with a total of 13 mL of 0.5% Marcaine administered in a Ballesteros block fashion to the left foot.  The left foot was then prepped aseptically and draped in the usual sterile manner.  The left foot was then elevated, exsanguinated, and pneumatic ankle tourniquet was inflated to 250 mmHg.  The left lower extremity was lowered to the table and sterile draping was completed.      Attention was directed to the dorsal aspect of the base of the first metatarsal where a 5 cm longitudinal linear incision was made just medial to the long extensor tendon.  The incision was deepened with care to clamp and cauterize all bleeders deemed necessary.  The incision was taken down through the subcutaneous tissues to the level of the periosteum.  Subcutaneous tissues were reflected off the periosteum.  At this time, a linear periosteal incision was made.  Periosteum was reflected off the dorsal and medial aspects of the first metatarsal base.  A 0.045 guidewire was placed at the dorsomedial aspect of the proximal part of the first metatarsal base.  This guidewire was oriented from a dorsal to plantar orientation such that it was perpendicular to the weight bearing plane.  Next, osteotomy was  created to dorsal metatarsal, parallel to this guidewire and extended from the guidewire and was oriented distally and laterally.  A second osteotomy was created at the same apex and was also oriented distally and laterally but the finish of the cut ended up being approximately 4 mm distal to the first cut.  The wedge of bone created from these 2 osteotomies was removed.  The osteotomy was then reduced with bone reduction forceps.        The osteotomy was then permanently fixated with two 2.7 mm cortical screws.  They were oriented from medial to lateral across the osteotomy.  Intraoperative fluoroscopy was used and confirmed good placement of the fixative with adequate reduction of deformity.  The wound was copiously irrigated with sterile saline.      A separate incision was made over the dorsal aspect of the first metatarsal head extending all the way to the distal part of the proximal phalanx.  The incision measured approximately 4 cm.  The incision was made medial to the long extensor tendon.  The incision was deepened through the subcutaneous tissues to the level of the first MPJ capsule.  Subcutaneous tissues were reflected off the capsule.  A linear capsular incision was made and capsular structures were reflected off the dorsal and medial aspects of the metatarsal head.  At this time, there was noted to be enlarged eminence at the medial first metatarsal head.  Using a sagittal saw, this eminence was resected from a dorsal to plantar orientation.  The resected bone was removed from the field and placed on the back table.  The periosteum was then reflected off the dorsal aspect of the proximal phalanx.  Osteotomy was created just at the dorsal aspect of the proximal base of the first proximal phalanx.  This osteotomy was oriented in a distal plantar orientation such that it was nearly parallel with the weightbearing surface.  The osteotomy was a complete osteotomy.  The dorsal aspect of the osteotomy was  rotated laterally approximately 3 mm.  The osteotomy was then permanently fixated with two 0.045 K-wires.        Intraoperative fluoroscopy was used at this time to confirm good placement of the K-wires and adequate reduction of deformity with good bony apposition.  This wound was copiously irrigated with sterile saline.  The capsule and periosteum was repaired in the first incision at the first metatarsal using 3-0 Vicryl in a simple interrupted suture fashion.  Capsule of the first MPJ and periosteum at the dorsal first proximal phalanx were repaired using 3-0 Vicryl in a simple interrupted suture fashion.  Subcutaneous tissues for both incisions were reapproximated using 4-0 Vicryl in a running subcutaneous suture fashion.  Skin was coapted using 5-0 Vicryl in a running subcuticular suture fashion.  The wound was dressed aseptically with Steri-Strips, Adaptic, 4 x 4s and Roopa.  A Morales dressing and posterior splint was applied to the left lower extremity.  The pneumatic ankle tourniquet was deflated and there was noted to be instantaneous perfusion and hyperemia to all digits of the left foot.      The patient tolerated the procedure and anesthesia well and was taken from OR to recovery with vital signs stable and vascular status intact to the left foot.  While in recovery, the patient received oral and written postop instructions.  The patient will be nonweightbearing on the left foot with crutch assist.  The patient was dispensed a prescription for Norco 5/325, #50, 1-2 p.o. q.4 hours p.r.n. pain.  The patient will follow with Dr. Meza on 2018 in the [facility] for postop management.         BETTYE MEZA DPM             D: 2018   T: 2018   MT: ANGÉLICA      Name:     LIZA RAMIREZ   MRN:      [id number]        Account:        [id number]   :      1965           Procedure Date: 2018      Document: [id number]

## 2018-07-06 NOTE — IP AVS SNAPSHOT
[facility] PreOP/PostOP    [address]    [facility] [address]    Phone:  [phone]    Fax:  [phone]                                       After Visit Summary   7/6/2018    Jayleen Lang    MRN: [id number]           After Visit Summary Signature Page     I have received my discharge instructions, and my questions have been answered. I have discussed any challenges I see with this plan with the nurse or doctor.    ..........................................................................................................................................  Patient/Patient Representative Signature      ..........................................................................................................................................  Patient Representative Print Name and Relationship to Patient    ..................................................               ................................................  Date                                            Time    ..........................................................................................................................................  Reviewed by Signature/Title    ...................................................              ..............................................  Date                                                            Time

## 2018-07-07 ENCOUNTER — TELEPHONE (OUTPATIENT)
Dept: INTERNAL MEDICINE | Facility: CLINIC | Age: 53
End: 2018-07-07

## 2018-07-07 DIAGNOSIS — F98.8 ATTENTION DEFICIT DISORDER, UNSPECIFIED HYPERACTIVITY PRESENCE: ICD-10-CM

## 2018-07-07 NOTE — TELEPHONE ENCOUNTER
Jayleen requesting refill of   methylphenidate (RITALIN) 20 MG tablet, Take 1 tablet (20 mg) by mouth 2 times daily.  Will need on Tuesday (7/10/18), states script is normally walked over to [facility] pharmacy.        Nel Freeman RN  FNA

## 2018-07-09 DIAGNOSIS — F41.1 GAD (GENERALIZED ANXIETY DISORDER): ICD-10-CM

## 2018-07-09 RX ORDER — METHYLPHENIDATE HYDROCHLORIDE 20 MG/1
20 TABLET ORAL 2 TIMES DAILY
Qty: 60 TABLET | Refills: 0 | Status: SHIPPED | OUTPATIENT
Start: 2018-07-09 | End: 2018-08-06

## 2018-07-09 NOTE — TELEPHONE ENCOUNTER
Last Written Prescription Date:  6/13/18  Last Fill Quantity: 30,  # refills: 0   Last office visit: 6/12/2018 with prescribing provider:     KASSY on file

## 2018-07-09 NOTE — TELEPHONE ENCOUNTER
Hand carry to  pharmacy.    Ritalin      Last Written Prescription Date:  6-8-18  Last Fill Quantity: 60,   # refills: 0  Last Office Visit: 6-12-18  Future Office visit:       Routing refill request to provider for review/approval because:  Drug not on the [facility] [place] [facility], [facility] or [facility] refill protocol or controlled substance    Signed CSA form in chart.    [place]: access not granted by primary care provider.    [place]  [email or web address]    Please advise, thanks.    This request forwarded to Dr Hernandez who covers Dr Mcdonald and Dr Castellanos patients with the letters T-Z

## 2018-07-10 RX ORDER — ALPRAZOLAM 0.5 MG
TABLET ORAL
Qty: 30 TABLET | Refills: 0 | Status: SHIPPED | OUTPATIENT
Start: 2018-07-10 | End: 2018-08-06

## 2018-07-11 ENCOUNTER — TRANSFERRED RECORDS (OUTPATIENT)
Dept: HEALTH INFORMATION MANAGEMENT | Facility: CLINIC | Age: 53
End: 2018-07-11

## 2018-08-03 ENCOUNTER — OFFICE VISIT (OUTPATIENT)
Dept: INTERNAL MEDICINE | Facility: CLINIC | Age: 53
End: 2018-08-03
Payer: COMMERCIAL

## 2018-08-03 ENCOUNTER — HOSPITAL ENCOUNTER (OUTPATIENT)
Dept: MAMMOGRAPHY | Facility: CLINIC | Age: 53
Discharge: HOME | End: 2018-08-03
Attending: INTERNAL MEDICINE | Admitting: INTERNAL MEDICINE
Payer: COMMERCIAL

## 2018-08-03 VITALS
OXYGEN SATURATION: 97 % | HEART RATE: 100 BPM | DIASTOLIC BLOOD PRESSURE: 60 MMHG | SYSTOLIC BLOOD PRESSURE: 110 MMHG | BODY MASS INDEX: 27.49 KG/M2 | WEIGHT: 165 LBS | HEIGHT: 65 IN

## 2018-08-03 DIAGNOSIS — F41.1 GAD (GENERALIZED ANXIETY DISORDER): Primary | ICD-10-CM

## 2018-08-03 DIAGNOSIS — Z12.31 VISIT FOR SCREENING MAMMOGRAM: ICD-10-CM

## 2018-08-03 DIAGNOSIS — Z98.890 STATUS POST BUNIONECTOMY: ICD-10-CM

## 2018-08-03 PROCEDURE — 99213 OFFICE O/P EST LOW 20 MIN: CPT | Performed by: INTERNAL MEDICINE

## 2018-08-03 PROCEDURE — 77063 BREAST TOMOSYNTHESIS BI: CPT

## 2018-08-03 NOTE — NURSING NOTE
"Vital signs:      BP: 110/60 Pulse: 100     SpO2: 97 %     Height: 5' 5\" (165.1 cm) Weight: 165 lb (74.8 kg)  Estimated body mass index is 27.46 kg/(m^2) as calculated from the following:    Height as of this encounter: 5' 5\" (1.651 m).    Weight as of this encounter: 165 lb (74.8 kg).          "

## 2018-08-03 NOTE — PROGRESS NOTES
SUBJECTIVE:   Jayleen Lang is a 52 year old female who presents to clinic today for the following health issues:      Patient concerned for possible insect bite.   Had mammogram, when taking off her bra something fell off her skin. Feels anxious, wants to be checked.   Has had recent left foot surgery. Concern for skin darker discoloration after the surgery.   Has anxiety and these problems have aggravated it.       Problem list and histories reviewed & adjusted, as indicated.  Additional history: none    Patient Active Problem List   Diagnosis     Rash     Menorrhagia     Obesity     REBEKAH (generalized anxiety disorder)     Essential hypertension, benign     Allergic reaction     Hyperlipidemia LDL goal <130     Chronic neck pain     Controlled substance agreement signed     Attention deficit disorder, unspecified hyperactivity presence     Past Surgical History:   Procedure Laterality Date     ARTHROPLASTY HIP  2017    left hip replaced     ARTHROSCOPIC REPAIR POSTERIOR CRUCIATE LIGAMENT       BUNIONECTOMY Left 2018    Procedure: BUNIONECTOMY;  1.  Closing base osteotomy, first metatarsal, left foot.   2.  Akin osteotomy, left great toe. ;  Surgeon: Eliezer Parra DPM;  Location: [facility] OR      DELIVERY ONLY  10/1998     COLONOSCOPY  06/15/2018    Dr. So [facility]     COLONOSCOPY N/A 6/15/2018    Procedure: COLONOSCOPY;  Colonoscopy ;  Surgeon: Luis Fernando So MD;  Location:  GI     Foot surgery - bone spurs  2002     GYN SURGERY       x 2     LAMINECT/DISCECTOMY, CERVICAL      C6-C7 Fusion.     OSTEOTOMY FOOT Left 2018    Procedure: OSTEOTOMY FOOT;;  Surgeon: Eliezer Parra DPM;  Location:  OR       Social History   Substance Use Topics     Smoking status: Never Smoker     Smokeless tobacco: Never Used     Alcohol use Yes      Comment: 5 beers a week     Family History   Problem Relation Age of Onset     Cancer Mother      lung     HEART DISEASE Father      congestive heart  "failure     Colon Cancer No family hx of          Current Outpatient Prescriptions   Medication Sig Dispense Refill     ALPRAZolam (XANAX) 0.5 MG tablet TAKE 1 TABLET BY MOUTH DAILY AS NEEDED FOR ANXIETY 30 tablet 0     cetirizine (ZYRTEC) 10 MG tablet Take 1 tablet (10 mg) by mouth every evening 90 tablet 3     EPINEPHrine (EPIPEN/ADRENACLICK/OR ANY BX GENERIC EQUIV) 0.3 MG/0.3ML injection 2-pack Inject 0.3 mLs (0.3 mg) into the muscle once as needed for anaphylaxis 0.3 mL 1     fluticasone (FLONASE) 50 MCG/ACT nasal spray Spray 1-2 sprays into both nostrils daily (Patient taking differently: Spray 1-2 sprays into both nostrils daily as needed ) 48 g 3     gabapentin (NEURONTIN) 600 MG tablet TAKE ONE TABLET BY MOUTH 3 TIMES DAILY. 90 tablet 3     hydrochlorothiazide (MICROZIDE) 12.5 MG capsule Take 1 capsule (12.5 mg) by mouth daily 90 capsule 3     HYDROcodone-acetaminophen (NORCO) 5-325 MG per tablet Take 1-2 tablets by mouth every 4 hours as needed for other (Moderate to Severe Pain) 50 tablet 0     lisinopril (PRINIVIL/ZESTRIL) 20 MG tablet TAKE ONE TABLET BY MOUTH DAILY 90 tablet 3     methylphenidate (RITALIN) 20 MG tablet Take 1 tablet (20 mg) by mouth 2 times daily 60 tablet 0     mometasone (ELOCON) 0.1 % cream Apply sparingly to affected area twice daily as needed.  Do not apply to face. 45 g 0     MULTIPLE VITAMIN PO Take 1 tablet by mouth daily       simvastatin (ZOCOR) 20 MG tablet Take 1 tablet (20 mg) by mouth At Bedtime 90 tablet 2       Reviewed and updated as needed this visit by clinical staff       Reviewed and updated as needed this visit by Provider         ROS:  CONSTITUTIONAL:NEGATIVE for fever, chills, change in weight  MUSCULOSKELETAL: NEGATIVE for significant arthralgias or myalgia    OBJECTIVE:     /60  Pulse 100  Ht 5' 5\" (1.651 m)  Wt 165 lb (74.8 kg)  LMP  (LMP Unknown)  SpO2 97%  BMI 27.46 kg/m2  Body mass index is 27.46 kg/(m^2).   GENERAL: healthy, alert and no " distress  MS: no gross musculoskeletal defects noted, no edema  Left foot in a cast , toes are darker discolored compared with the right foot   SKIN: no suspicious lesions or rashes    Diagnostic Test Results:  none     ASSESSMENT/PLAN:     Problem List Items Addressed This Visit     REBEKAH (generalized anxiety disorder) - Primary      Other Visit Diagnoses     Status post bunionectomy               Checked for insects bites, nothing on exam in the breast,   She brought the particle that fell off her - this seems to be piece of food   Foot discoloration is likely from recent surgical antiseptic prep   Patient reassured     Follow-Up:as needed     New Castellanos MD  [facility]

## 2018-08-03 NOTE — MR AVS SNAPSHOT
"              After Visit Summary   8/3/2018    Jayleen Lang    MRN: [id number]           Patient Information     Date Of Birth          1965        Visit Information        Provider Department      8/3/2018 2:20 PM New Castellanos MD [facility]        Today's Diagnoses     REBEKAH (generalized anxiety disorder)    -  1    Status post bunionectomy           Follow-ups after your visit        Who to contact     If you have questions or need follow up information about today's clinic visit or your schedule please contact [facility] directly at [phone].  Normal or non-critical lab and imaging results will be communicated to you by MyChart, letter or phone within 4 business days after the clinic has received the results. If you do not hear from us within 7 days, please contact the clinic through MyChart or phone. If you have a critical or abnormal lab result, we will notify you by phone as soon as possible.  Submit refill requests through [employer] or call your pharmacy and they will forward the refill request to us. Please allow 3 business days for your refill to be completed.          Additional Information About Your Visit        Care EveryWhere ID     This is your Care EveryWhere ID. This could be used by other organizations to access your [facility] medical records  [id number]        Your Vitals Were     Pulse Height Last Period Pulse Oximetry BMI (Body Mass Index)       100 5' 5\" (1.651 m) (LMP Unknown) 97% 27.46 kg/m2        Blood Pressure from Last 3 Encounters:   08/03/18 110/60   07/06/18 (!) 137/98   06/15/18 123/88    Weight from Last 3 Encounters:   08/03/18 165 lb (74.8 kg)   07/02/18 182 lb 8.7 oz (82.8 kg)   06/15/18 182 lb (82.6 kg)              Today, you had the following     No orders found for display         Today's Medication Changes          These changes are accurate as of 8/3/18  2:43 PM.  If you have any questions, ask your nurse or doctor.             "   These medicines have changed or have updated prescriptions.        Dose/Directions    fluticasone 50 MCG/ACT spray   Commonly known as:  FLONASE   This may have changed:    - when to take this  - reasons to take this   Used for:  Pollen allergies        Dose:  1-2 spray   Spray 1-2 sprays into both nostrils daily   Quantity:  48 g   Refills:  3                Primary Care Provider Office Phone # Fax #    New Castellanos -[phone]       Syed CARRANZAMARCO [facility] [address]        Equal Access to Services     [facility] : Hadii aad ku hadasho Soomaali, waaxda luqadaha, qaybta kaalmada adeegyada, waxay idiin hayaan aderobbie martin . So [facility] [phone].    ATENCIÓN: Si habla español, tiene a villagomez disposición servicios gratuitos de asistencia lingüística. [facility] [phone].    We comply with applicable federal civil rights laws and [place] laws. We do not discriminate on the basis of race, color, national origin, age, disability, sex, sexual orientation, or gender identity.            Thank you!     Thank you for choosing [facility]  for your care. Our goal is always to provide you with excellent care. Hearing back from our patients is one way we can continue to improve our services. Please take a few minutes to complete the written survey that you may receive in the mail after your visit with us. Thank you!             Your Updated Medication List - Protect others around you: Learn how to safely use, store and throw away your medicines at www.disposemymeds.org.          This list is accurate as of 8/3/18  2:43 PM.  Always use your most recent med list.                   Brand Name Dispense Instructions for use Diagnosis    ALPRAZolam 0.5 MG tablet    XANAX    30 tablet    TAKE 1 TABLET BY MOUTH DAILY AS NEEDED FOR ANXIETY    REBEKAH (generalized anxiety disorder)       cetirizine 10 MG tablet    zyrTEC    90 tablet    Take 1 tablet (10 mg) by mouth every evening     Chronic seasonal allergic rhinitis, unspecified trigger       EPINEPHrine 0.3 MG/0.3ML injection 2-pack    EPIPEN/ADRENACLICK/or ANY BX GENERIC EQUIV    0.3 mL    Inject 0.3 mLs (0.3 mg) into the muscle once as needed for anaphylaxis    History of bee sting allergy       fluticasone 50 MCG/ACT spray    FLONASE    48 g    Spray 1-2 sprays into both nostrils daily    Pollen allergies       gabapentin 600 MG tablet    NEURONTIN    90 tablet    TAKE ONE TABLET BY MOUTH 3 TIMES DAILY.    Neuropathy       hydrochlorothiazide 12.5 MG capsule    MICROZIDE    90 capsule    Take 1 capsule (12.5 mg) by mouth daily    Essential hypertension       HYDROcodone-acetaminophen 5-325 MG per tablet    NORCO    50 tablet    Take 1-2 tablets by mouth every 4 hours as needed for other (Moderate to Severe Pain)    Hallux valgus (acquired), left foot       lisinopril 20 MG tablet    PRINIVIL/ZESTRIL    90 tablet    TAKE ONE TABLET BY MOUTH DAILY    Essential hypertension       methylphenidate 20 MG tablet    RITALIN    60 tablet    Take 1 tablet (20 mg) by mouth 2 times daily    Attention deficit disorder, unspecified hyperactivity presence       mometasone 0.1 % cream    ELOCON    45 g    Apply sparingly to affected area twice daily as needed.  Do not apply to face.    Angular cheilitis       MULTIPLE VITAMIN PO      Take 1 tablet by mouth daily        simvastatin 20 MG tablet    ZOCOR    90 tablet    Take 1 tablet (20 mg) by mouth At Bedtime    Mixed hyperlipidemia

## 2018-08-06 ENCOUNTER — TELEPHONE (OUTPATIENT)
Dept: INTERNAL MEDICINE | Facility: CLINIC | Age: 53
End: 2018-08-06

## 2018-08-06 DIAGNOSIS — F41.1 GAD (GENERALIZED ANXIETY DISORDER): ICD-10-CM

## 2018-08-06 DIAGNOSIS — F98.8 ATTENTION DEFICIT DISORDER, UNSPECIFIED HYPERACTIVITY PRESENCE: ICD-10-CM

## 2018-08-06 RX ORDER — ALPRAZOLAM 0.5 MG
TABLET ORAL
Qty: 30 TABLET | Refills: 0 | Status: SHIPPED | OUTPATIENT
Start: 2018-08-06 | End: 2018-09-03

## 2018-08-06 NOTE — TELEPHONE ENCOUNTER
Reason for Call:  Medication or medication refill:    Do you use a [facility]?  Name of the [facility] and [facility] the [facility] - [phone]    Name of the medication requested: ritalin    Other request: none    Can we leave a detailed message on this number? YES    Phone number patient can be reached at: Home number on file [phone] (home)    Best Time: any    Call taken on 8/6/2018 at 9:31 AM by Keturah Zamorano

## 2018-08-06 NOTE — TELEPHONE ENCOUNTER
Patient wants prescription brought down to [facility] pharmacy         Last Written Prescription Date:  7/9/18  Last Fill Quantity: 60,  # refills: 0   Last office visit: 8/3/2018 with prescribing provider:     KASSY on file

## 2018-08-06 NOTE — TELEPHONE ENCOUNTER
Last Written Prescription Date:  7/10/18  Last Fill Quantity: 30,  # refills: 0   Last office visit: 8/3/2018 with prescribing provider:     KASSY on file

## 2018-08-08 RX ORDER — METHYLPHENIDATE HYDROCHLORIDE 20 MG/1
20 TABLET ORAL 2 TIMES DAILY
Qty: 60 TABLET | Refills: 0 | Status: SHIPPED | OUTPATIENT
Start: 2018-08-08 | End: 2018-09-05

## 2018-08-11 DIAGNOSIS — I10 ESSENTIAL HYPERTENSION: ICD-10-CM

## 2018-08-13 NOTE — TELEPHONE ENCOUNTER
"Requested Prescriptions   Pending Prescriptions Disp Refills     hydrochlorothiazide (MICROZIDE) 12.5 MG capsule [Pharmacy Med Name: HydroCHLOROthiazide Oral Capsule 12.5 MG] 90 capsule 2    Last Written Prescription Date:  08/09/2017  Last Fill Quantity: 90,  # refills: 3   Last office visit: 8/3/2018 with prescribing provider:     Future Office Visit:   Sig: TAKE ONE CAPSULE BY MOUTH EVERY DAY    Diuretics (Including Combos) Protocol Passed    8/11/2018  7:02 AM       Passed - Blood pressure under 140/90 in past 12 months    BP Readings from Last 3 Encounters:   08/03/18 110/60   07/06/18 (!) 137/98   06/15/18 123/88                Passed - Recent (12 mo) or future (30 days) visit within the authorizing provider's specialty    Patient had office visit in the last 12 months or has a visit in the next 30 days with authorizing provider or within the authorizing provider's specialty.  See \"Patient Info\" tab in inbasket, or \"Choose Columns\" in Meds & Orders section of the refill encounter.           Passed - Patient is age 18 or older       Passed - No active pregancy on record       Passed - Normal serum creatinine on file in past 12 months    Recent Labs   Lab Test  07/03/18   0824   CR  0.91             Passed - Normal serum potassium on file in past 12 months    Recent Labs   Lab Test  07/03/18   0824   POTASSIUM  3.8                   Passed - Normal serum sodium on file in past 12 months    Recent Labs   Lab Test  07/03/18   0824   NA  141             Passed - No positive pregnancy test in past 12 months        lisinopril (PRINIVIL/ZESTRIL) 20 MG tablet [Pharmacy Med Name: Lisinopril Oral Tablet 20 MG] 90 tablet 2    Last Written Prescription Date:  08/09/2017  Last Fill Quantity: 90,  # refills: 3   Last office visit: 8/3/2018 with prescribing provider:     Future Office Visit:   Sig: TAKE ONE TABLET BY MOUTH EVERY DAY    ACE Inhibitors (Including Combos) Protocol Passed    8/11/2018  7:02 AM       Passed - " "Blood pressure under 140/90 in past 12 months    BP Readings from Last 3 Encounters:   08/03/18 110/60   07/06/18 (!) 137/98   06/15/18 123/88                Passed - Recent (12 mo) or future (30 days) visit within the authorizing provider's specialty    Patient had office visit in the last 12 months or has a visit in the next 30 days with authorizing provider or within the authorizing provider's specialty.  See \"Patient Info\" tab in inbasket, or \"Choose Columns\" in Meds & Orders section of the refill encounter.           Passed - Patient is age 18 or older       Passed - No active pregnancy on record       Passed - Normal serum creatinine on file in past 12 months    Recent Labs   Lab Test  07/03/18   0824   CR  0.91            Passed - Normal serum potassium on file in past 12 months    Recent Labs   Lab Test  07/03/18   0824   POTASSIUM  3.8            Passed - No positive pregnancy test in past 12 months        "

## 2018-08-14 RX ORDER — HYDROCHLOROTHIAZIDE 12.5 MG/1
CAPSULE ORAL
Qty: 90 CAPSULE | Refills: 3 | Status: SHIPPED | OUTPATIENT
Start: 2018-08-14 | End: 2019-03-06

## 2018-08-14 RX ORDER — LISINOPRIL 20 MG/1
TABLET ORAL
Qty: 90 TABLET | Refills: 3 | Status: SHIPPED | OUTPATIENT
Start: 2018-08-14 | End: 2019-08-21

## 2018-08-29 ENCOUNTER — TELEPHONE (OUTPATIENT)
Dept: PHARMACY | Facility: OTHER | Age: 53
End: 2018-08-29

## 2018-08-29 NOTE — TELEPHONE ENCOUNTER
MTM referral from: [facility] recruitment    MTM referral outreach attempt #1 on August 29, 2018 at 3:47 PM      Outcome: Spoke with patient and she asked that we call back in about 2 weeks. 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     Kaye Owens, MTM Coordinator Intern

## 2018-09-03 DIAGNOSIS — F41.1 GAD (GENERALIZED ANXIETY DISORDER): ICD-10-CM

## 2018-09-03 DIAGNOSIS — F98.8 ATTENTION DEFICIT DISORDER, UNSPECIFIED HYPERACTIVITY PRESENCE: ICD-10-CM

## 2018-09-05 NOTE — TELEPHONE ENCOUNTER
Requested Prescriptions   Pending Prescriptions Disp Refills     ALPRAZolam (XANAX) 0.5 MG tablet [Pharmacy Med Name: ALPRAZolam Oral Tablet 0.5 MG] 30 tablet 0     Sig: TAKE 1 TABLET BY MOUTH EVERY DAY AS NEEDED FOR ANXIETY    There is no refill protocol information for this order      Last Written Prescription Date:  08/06/2018  Last Fill Quantity: 30,  # refills: 0   Last office visit: 8/3/2018 with prescribing provider:     Future Office Visit:

## 2018-09-05 NOTE — TELEPHONE ENCOUNTER
Alprazolam to [facility] but Ritalin to [facility].  Patient states she needs the Ritalin by Fri. 9/7/18.      Ritalin  20mg     Last Written Prescription Date:  8/8/18  Last Fill Quantity: 60,   # refills: 0  Last Office Visit: 8/3/18  Future Office visit:       Routing refill request to provider for review/approval because:  Drug not on the [facility], [facility] or [facility] refill protocol or controlled substance  Signed CSA on file  RX [place] ([place] not reviewed as no access granted by this provider.  Please consider granting access to triage RNs.  MISBAH Flor R.N.

## 2018-09-06 RX ORDER — METHYLPHENIDATE HYDROCHLORIDE 20 MG/1
20 TABLET ORAL 2 TIMES DAILY
Qty: 60 TABLET | Refills: 0 | Status: SHIPPED | OUTPATIENT
Start: 2018-09-06 | End: 2018-10-05

## 2018-09-06 RX ORDER — ALPRAZOLAM 0.5 MG
TABLET ORAL
Qty: 30 TABLET | Refills: 0 | Status: SHIPPED | OUTPATIENT
Start: 2018-09-06 | End: 2018-10-11

## 2018-09-06 NOTE — TELEPHONE ENCOUNTER
Patient calling again to check the status of the request.  She is leaving tomorrow morning on a plane and is wondering if a partner could fill her medications.  Marla Green RN

## 2018-09-06 NOTE — TELEPHONE ENCOUNTER
Done, advise patient that she needs to call use 3 business days before she needs the medication. She should not call the pharmacy for controlled substances.

## 2018-10-03 ENCOUNTER — TRANSFERRED RECORDS (OUTPATIENT)
Dept: HEALTH INFORMATION MANAGEMENT | Facility: CLINIC | Age: 53
End: 2018-10-03

## 2018-10-04 ENCOUNTER — TELEPHONE (OUTPATIENT)
Dept: INTERNAL MEDICINE | Facility: CLINIC | Age: 53
End: 2018-10-04

## 2018-10-04 DIAGNOSIS — F98.8 ATTENTION DEFICIT DISORDER, UNSPECIFIED HYPERACTIVITY PRESENCE: ICD-10-CM

## 2018-10-04 NOTE — TELEPHONE ENCOUNTER
Ritalin      Last Written Prescription Date: 9/6/18   Last Fill Quantity: 60,   # refills: 0  Last Office Visit: 8/3/18  Future Office visit:       Routing refill request to provider for review/approval because:  Drug not on the [facility], [facility] or [facility] refill protocol or controlled substance

## 2018-10-04 NOTE — TELEPHONE ENCOUNTER
Reason for Call:  Medication or medication refill:    Do you use a [facility]?  Name of the [facility] and phone [facility] for the current request[facility] - [phone]    Name of the medication requested: Ritalin    Other request: none    Can we leave a detailed message on this number? YES    Phone number patient can be reached at: Cell number on file:    Telephone Information:   Mobile [phone]       Best Time: anytime    Call taken on 10/4/2018 at 7:35 AM by Ada Mazariegos

## 2018-10-05 RX ORDER — METHYLPHENIDATE HYDROCHLORIDE 20 MG/1
20 TABLET ORAL 2 TIMES DAILY
Qty: 60 TABLET | Refills: 0 | Status: SHIPPED | OUTPATIENT
Start: 2018-10-05 | End: 2018-11-01

## 2018-10-05 NOTE — TELEPHONE ENCOUNTER
Rx brought down to  pharmacy- pt advised to call pharmacy to make sure it has been process and ready for  before coming in.  Left detail message.

## 2018-10-05 NOTE — TELEPHONE ENCOUNTER
Patient calls to check on status of refill request. Wants to make sure she has before weekend. Patient states she was informed it was going to be brought to provider. Informed patient that Dr. Castellanos is not in today, but that it has been routed to covering providers to review.

## 2018-10-10 ENCOUNTER — TRANSFERRED RECORDS (OUTPATIENT)
Dept: HEALTH INFORMATION MANAGEMENT | Facility: CLINIC | Age: 53
End: 2018-10-10

## 2018-10-11 DIAGNOSIS — F41.1 GAD (GENERALIZED ANXIETY DISORDER): ICD-10-CM

## 2018-10-12 ENCOUNTER — TRANSFERRED RECORDS (OUTPATIENT)
Dept: HEALTH INFORMATION MANAGEMENT | Facility: CLINIC | Age: 53
End: 2018-10-12

## 2018-10-12 RX ORDER — ALPRAZOLAM 0.5 MG
TABLET ORAL
Qty: 30 TABLET | Refills: 0 | Status: SHIPPED | OUTPATIENT
Start: 2018-10-12 | End: 2018-11-12

## 2018-10-12 NOTE — TELEPHONE ENCOUNTER
Requested Prescriptions   Pending Prescriptions Disp Refills     ALPRAZolam (XANAX) 0.5 MG tablet [Pharmacy Med Name: ALPRAZolam Oral Tablet 0.5 MG] 30 tablet 0     Sig: TAKE 1 TABLET BY MOUTH EVERY DAY AS NEEDED FOR ANXIETY    There is no refill protocol information for this order      Last Written Prescription Date:  09/06/2018  Last Fill Quantity: 30,  # refills: 0   Last office visit: 8/3/2018 with prescribing provider:     Future Office Visit:

## 2018-11-01 DIAGNOSIS — F98.8 ATTENTION DEFICIT DISORDER, UNSPECIFIED HYPERACTIVITY PRESENCE: ICD-10-CM

## 2018-11-01 NOTE — TELEPHONE ENCOUNTER
Reason for Call:  Medication or medication refill:    Do you use a [facility]?  Yes     Name of the pharmacy and phone number for the current request[facility]     Name of the medication requested: methlphenidate    Other request: none     Can we leave a detailed message on this number? YES    Phone number patient can be reached at: Home number on file [phone] (home)    Best Time: asap    Call taken on 11/1/2018 at 9:40 AM by Johanne Luong

## 2018-11-01 NOTE — TELEPHONE ENCOUNTER
Ritalin      Last Written Prescription Date:  10/5/18  Last Fill Quantity: 60,   # refills: 0  Last Office Visit: 8/3/18  Future Office visit:       Routing refill request to provider for review/approval because:  Drug not on the [facility], [facility] or [facility] refill protocol or controlled substance  Marla Green RN

## 2018-11-02 ENCOUNTER — TRANSFERRED RECORDS (OUTPATIENT)
Dept: HEALTH INFORMATION MANAGEMENT | Facility: CLINIC | Age: 53
End: 2018-11-02

## 2018-11-02 RX ORDER — METHYLPHENIDATE HYDROCHLORIDE 20 MG/1
20 TABLET ORAL 2 TIMES DAILY
Qty: 60 TABLET | Refills: 0 | Status: SHIPPED | OUTPATIENT
Start: 2018-11-02 | End: 2018-11-30

## 2018-11-08 DIAGNOSIS — G62.9 NEUROPATHY: ICD-10-CM

## 2018-11-08 NOTE — TELEPHONE ENCOUNTER
Requested Prescriptions   Pending Prescriptions Disp Refills     gabapentin (NEURONTIN) 600 MG tablet [Pharmacy Med Name: Gabapentin Oral Tablet 600 MG]      Last Written Prescription Date:  5/25/2018  Last Fill Quantity: 90 tablet,   # refills: 3  Last Office Visit: 8/3/2018 with New Castellanos  Future Office visit:       Routing refill request to provider for review/approval because:  Drug not on the [facility], [facility] or  Health refill protocol or controlled substance   90 tablet 2     Sig: TAKE ONE TABLET BY MOUTH THREE TIMES DAILY    There is no refill protocol information for this order

## 2018-11-09 RX ORDER — GABAPENTIN 600 MG/1
TABLET ORAL
Qty: 90 TABLET | Refills: 2 | Status: SHIPPED | OUTPATIENT
Start: 2018-11-09 | End: 2019-03-19

## 2018-11-12 DIAGNOSIS — F41.1 GAD (GENERALIZED ANXIETY DISORDER): ICD-10-CM

## 2018-11-13 RX ORDER — ALPRAZOLAM 0.5 MG
TABLET ORAL
Qty: 30 TABLET | Refills: 0 | Status: SHIPPED | OUTPATIENT
Start: 2018-11-13 | End: 2018-12-11

## 2018-11-13 NOTE — TELEPHONE ENCOUNTER
Requested Prescriptions   Pending Prescriptions Disp Refills     ALPRAZolam (XANAX) 0.5 MG tablet [Pharmacy Med Name: ALPRAZolam Oral Tablet 0.5 MG]  Last Written Prescription Date:  10/12/18  Last Fill Quantity: 30,  # refills: 0   Last office visit: 8/3/2018 with prescribing provider:  Emanuel   Future Office Visit:     30 tablet 0     Sig: TAKE ONE TABLET BY MOUTH DAILY AS NEEDED FOR ANXIETY    There is no refill protocol information for this order

## 2018-11-14 ENCOUNTER — THERAPY VISIT (OUTPATIENT)
Dept: PHYSICAL THERAPY | Facility: CLINIC | Age: 53
End: 2018-11-14
Payer: COMMERCIAL

## 2018-11-14 DIAGNOSIS — M79.675 PAIN OF TOE OF LEFT FOOT: Primary | ICD-10-CM

## 2018-11-14 DIAGNOSIS — M79.672 LEFT FOOT PAIN: ICD-10-CM

## 2018-11-14 DIAGNOSIS — Z98.890 S/P BUNIONECTOMY: ICD-10-CM

## 2018-11-14 PROCEDURE — 97140 MANUAL THERAPY 1/> REGIONS: CPT | Mod: GP | Performed by: PHYSICAL THERAPIST

## 2018-11-14 PROCEDURE — 97161 PT EVAL LOW COMPLEX 20 MIN: CPT | Mod: GP | Performed by: PHYSICAL THERAPIST

## 2018-11-14 PROCEDURE — 97110 THERAPEUTIC EXERCISES: CPT | Mod: GP | Performed by: PHYSICAL THERAPIST

## 2018-11-15 PROBLEM — M79.675 PAIN OF TOE OF LEFT FOOT: Status: ACTIVE | Noted: 2018-11-15

## 2018-11-15 PROBLEM — M79.672 LEFT FOOT PAIN: Status: ACTIVE | Noted: 2018-11-15

## 2018-11-15 PROBLEM — Z98.890 S/P BUNIONECTOMY: Status: ACTIVE | Noted: 2018-11-15

## 2018-11-15 NOTE — PROGRESS NOTES
[facility] Initial Evaluation  Subjective:  Patient is a 53 year old female presenting with rehab left ankle/foot hpi.           Pt presents to PT with concerns of ongoing left foot pain and limited ROM in her left big toe following a bunionectomy procedure that was performed on 7/6/18.  She locates the pain on the dorsal side of the MTP joint in the area of the incision as well as the lateral foot and ankle.  Felt with walking, especially prolonged walking or on uneven terrain.  Sought a second opinion due to her ongoing sxs and a CT scan was done.  Results unknown presently.  She has secondary concerns of weakness of the left ankle, quad, and hip.  Hx of left THR in 2017..    Patient reports pain:  Great toe and lateral.    Pain is described as aching and sharp and is intermittent and reported as 3/10.  Associated symptoms:  Loss of strength and loss of motion/stiffness. Pain is the same all the time.  Symptoms are exacerbated by walking and relieved by rest.  Since onset symptoms are unchanged.  Special tests:  CT scan.  Previous treatment: none.    General health as reported by patient is good.                  Barriers include:  None as reported by patient.    Red flags:  None as reported by patient.                        Objective:  Standing Alignment:                Ankle/Foot:  Hallux valgus R    Gait:  Pt does not move her weight to the medial foot, keeping the weight to the lateral foot.  Left great toe does not contact the ground due to lack of MTP flexion and no medial weight transfer.      Deviations:  Ankle:  Pronation decr L and push off decr L          Ankle/Foot Evaluation  ROM:    AROM:    Dorsiflexion:  Left:   8  Right:   12  Plantarflexion:  Left:  Full    Right:  Full  Inversion:  Left:  Full     Right:  Full  Eversion:  Full     Right:  Full  Great toe flexion:  Left:  Limited     Right:  Full  Great Toe Extension:  Left:  Slight limtation     Right: Full      LIGAMENT  TESTING: normal                PALPATION: Palpation of ankle: No pain at the 5th metatarsal.  Slight pain at the incision in the area of the 1st MTP.      EDEMA: normal          MOBILITY TESTING:             First Ray Left: hypomobile    First Ray Right: hypomobile                                                    General     ROS    Assessment/Plan:    Patient is a 53 year old female with left foot/toe complaints.    Patient has the following significant findings with corresponding treatment plan.                Diagnosis 1:  Left foot pain s/p bunionectomy  Pain -  hot/cold therapy, manual therapy, education and home program  Decreased ROM/flexibility - manual therapy, therapeutic exercise and home program  Decreased joint mobility - manual therapy, therapeutic exercise and home program  Impaired gait - gait training    Therapy Evaluation Codes:   1) History comprised of:   Personal factors that impact the plan of care:      None.    Comorbidity factors that impact the plan of care are:      None.     Medications impacting care: None.  2) Examination of Body Systems comprised of:   Body structures and functions that impact the plan of care:      Toes.   Activity limitations that impact the plan of care are:      Walking.  3) Clinical presentation characteristics are:   Stable/Uncomplicated.  4) Decision-Making    Low complexity using standardized patient assessment instrument and/or measureable assessment of functional outcome.  Cumulative Therapy Evaluation is: Low complexity.    Previous and current functional limitations:  (See Goal Flow Sheet for this information)    Short term and Long term goals: (See Goal Flow Sheet for this information)     Communication ability:  Patient appears to be able to clearly communicate and understand verbal and written communication and follow directions correctly.  Treatment Explanation - The following has been discussed with the patient:   RX ordered/plan of care  Anticipated  outcomes  Possible risks and side effects  This patient would benefit from PT intervention to resume normal activities.   Rehab potential is good.    Frequency:  1 X week, once daily  Duration:  for 6 weeks  Discharge Plan:  Achieve all LTG.  Independent in home treatment program.  Reach maximal therapeutic benefit.      Please refer to the daily flowsheet for treatment today, total treatment time and time spent performing 1:1 timed codes.

## 2018-11-15 NOTE — PROGRESS NOTES
[facility] Initial Evaluation  Subjective:  Patient is a 53 year old female presenting with rehab left ankle/foot hpi.                                      Pertinent medical history includes:  Numbness/tingling.    Surgical history: bunion, 2 acl, 2 neck.  Current medications:  High blood pressure medication.  Current occupation is teacher.    Primary job tasks include:  Lifting and prolonged standing (computer work, carrying).        Red flags:  Cold/hot extremity and pain at night/rest.                        Objective:  System    Physical Exam    General     ROS    Assessment/Plan:

## 2018-11-20 ENCOUNTER — THERAPY VISIT (OUTPATIENT)
Dept: PHYSICAL THERAPY | Facility: CLINIC | Age: 53
End: 2018-11-20
Payer: COMMERCIAL

## 2018-11-20 DIAGNOSIS — M79.672 LEFT FOOT PAIN: ICD-10-CM

## 2018-11-20 DIAGNOSIS — M79.675 PAIN OF TOE OF LEFT FOOT: ICD-10-CM

## 2018-11-20 DIAGNOSIS — Z98.890 S/P BUNIONECTOMY: ICD-10-CM

## 2018-11-20 PROCEDURE — 97140 MANUAL THERAPY 1/> REGIONS: CPT | Mod: GP

## 2018-11-20 PROCEDURE — 97110 THERAPEUTIC EXERCISES: CPT | Mod: GP

## 2018-11-20 NOTE — MR AVS SNAPSHOT
After Visit Summary   11/20/2018    Jayleen Lang    MRN: [id number]           Patient Information     Date Of Birth          1965        Visit Information        Provider Department      11/20/2018 4:40 PM Carisa Chacko [facility]        Today's Diagnoses     Pain of toe of left foot        Left foot pain        S/P bunionectomy           Follow-ups after your visit        Your next 10 appointments already scheduled     Nov 23, 2018  4:20 PM CST   JESSI Extremity with Carisa Chacko [facility] ([facility]    [address] [address]   [phone]            Nov 28, 2018  4:40 PM CST   JESSI Extremity with Carisa Chacko [facility] ([facility])    [address] [address]   [phone]            Dec 06, 2018  4:20 PM CST   JESSI Extremity with Twan Justice [facility] ([facility])    [address] [address] [phone]              Who to contact     If you have questions or need follow up information about today's clinic visit or your schedule please contact [facility] directly at [phone].  Normal or non-critical lab and imaging results will be communicated to you by MyChart, letter or phone within 4 business days after the clinic has received the results. If you do not hear from us within 7 days, please contact the clinic through MyChart or phone. If you have a critical or abnormal lab result, we will notify you by phone as soon as possible.  Submit refill requests through ReVera or call your pharmacy and they will forward the refill request to us. Please allow 3 business days for your refill to be completed.          Additional Information About Your Visit        ReVera Information     ReVera gives you secure access to your electronic health record. If you see a primary  care provider, you can also send messages to your care team and make appointments. If you have questions, please call your primary care clinic.  If you do not have a primary care provider, please call [phone] and they will assist you.        Care EveryWhere ID     This is your Care EveryWhere ID. This could be used by other organizations to access your [facility] medical records  [id number]        Your Vitals Were     Last Period                   (LMP Unknown)            Blood Pressure from Last 3 Encounters:   08/03/18 110/60   07/06/18 (!) 137/98   06/15/18 123/88    Weight from Last 3 Encounters:   08/03/18 74.8 kg (165 lb)   07/02/18 82.8 kg (182 lb 8.7 oz)   06/15/18 82.6 kg (182 lb)              We Performed the Following     Manual Ther Tech, 1+Regions, EA 15 min     Therapeutic Exercises          Today's Medication Changes          These changes are accurate as of 11/20/18  6:06 PM.  If you have any questions, ask your nurse or doctor.               These medicines have changed or have updated prescriptions.        Dose/Directions    fluticasone 50 MCG/ACT spray   Commonly known as:  FLONASE   This may have changed:    - when to take this  - reasons to take this   Used for:  Pollen allergies        Dose:  1-2 spray   Spray 1-2 sprays into both nostrils daily   Quantity:  48 g   Refills:  3                Primary Care Provider Office Phone # Fax #    New Castellanos [phone]       [address] [facility] [address]        Equal Access to Services     BOUCHRA OSORIO AH: Hadii aad ku hadasho Soalbaali, waaxda luqadaha, qaybta kaalmada chavoyafaisal, mendez armstrong. So [facility] [phone].    ATENCIÓN: Si habla español, tiene a villagomez disposición servicios gratuitos de asistencia lingüística. Llame al [phone].    We comply with applicable federal civil rights laws and [place] laws. We do not discriminate on the basis of race, color, national origin, age,  disability, sex, sexual orientation, or gender identity.            Thank you!     Thank you for choosing [facility] [place]  for your care. Our goal is always to provide you with excellent care. Hearing back from our patients is one way we can continue to improve our services. Please take a few minutes to complete the written survey that you may receive in the mail after your visit with us. Thank you!             Your Updated Medication List - Protect others around you: Learn how to safely use, store and throw away your medicines at www.disposemymeds.org.          This list is accurate as of 11/20/18  6:06 PM.  Always use your most recent med list.                   Brand Name Dispense Instructions for use Diagnosis    ALPRAZolam 0.5 MG tablet    XANAX    30 tablet    TAKE ONE TABLET BY MOUTH DAILY AS NEEDED FOR ANXIETY    REBEKAH (generalized anxiety disorder)       cetirizine 10 MG tablet    zyrTEC    90 tablet    Take 1 tablet (10 mg) by mouth every evening    Chronic seasonal allergic rhinitis, unspecified trigger       EPINEPHrine 0.3 MG/0.3ML injection 2-pack    EPIPEN/ADRENACLICK/or ANY BX GENERIC EQUIV    0.3 mL    Inject 0.3 mLs (0.3 mg) into the muscle once as needed for anaphylaxis    History of bee sting allergy       fluticasone 50 MCG/ACT spray    FLONASE    48 g    Spray 1-2 sprays into both nostrils daily    Pollen allergies       gabapentin 600 MG tablet    NEURONTIN    90 tablet    TAKE ONE TABLET BY MOUTH THREE TIMES DAILY    Neuropathy       hydrochlorothiazide 12.5 MG capsule    MICROZIDE    90 capsule    TAKE ONE CAPSULE BY MOUTH EVERY DAY    Essential hypertension       HYDROcodone-acetaminophen 5-325 MG per tablet    NORCO    50 tablet    Take 1-2 tablets by mouth every 4 hours as needed for other (Moderate to Severe Pain)    Hallux valgus (acquired), left foot       lisinopril 20 MG tablet    PRINIVIL/ZESTRIL    90 tablet    TAKE ONE TABLET BY MOUTH EVERY DAY    Essential  hypertension       methylphenidate 20 MG tablet    RITALIN    60 tablet    Take 1 tablet (20 mg) by mouth 2 times daily    Attention deficit disorder, unspecified hyperactivity presence       mometasone 0.1 % cream    ELOCON    45 g    Apply sparingly to affected area twice daily as needed.  Do not apply to face.    Angular cheilitis       MULTIPLE VITAMIN PO      Take 1 tablet by mouth daily        simvastatin 20 MG tablet    ZOCOR    90 tablet    Take 1 tablet (20 mg) by mouth At Bedtime    Mixed hyperlipidemia

## 2018-11-27 NOTE — TELEPHONE ENCOUNTER
MTM referral from: [facility] recruitment    MTM referral outreach attempt #2 on November 27, 2018 at 3:30 PM      Outcome: Left Message    Kaye Owens, MTM Coordinator Intern

## 2018-11-27 NOTE — TELEPHONE ENCOUNTER
Patient returned call and is not interested at this time. Will call in the future if/ when interested.

## 2018-11-30 DIAGNOSIS — F98.8 ATTENTION DEFICIT DISORDER, UNSPECIFIED HYPERACTIVITY PRESENCE: ICD-10-CM

## 2018-11-30 RX ORDER — METHYLPHENIDATE HYDROCHLORIDE 20 MG/1
20 TABLET ORAL 2 TIMES DAILY
Qty: 60 TABLET | Refills: 0 | Status: SHIPPED | OUTPATIENT
Start: 2018-11-30 | End: 2018-12-31

## 2018-11-30 NOTE — TELEPHONE ENCOUNTER
Patient calls to request refill of Ritalin.   Fill at [facility].     Requested Prescriptions   Pending Prescriptions Disp Refills     methylphenidate (RITALIN) 20 MG tablet  Last Written Prescription Date:  11/2/18  Last Fill Quantity: 60,  # refills: 0   Last office visit: 8/3/2018 with prescribing provider:  Dr. Castellanos   Future Office Visit:    60 tablet 0     Sig: Take 1 tablet (20 mg) by mouth 2 times daily    There is no refill protocol information for this order      Signed CSA on file.     Routing refill request to provider for review/approval because:  Drug not on the [facility] refill protocol

## 2018-12-03 ENCOUNTER — TRANSFERRED RECORDS (OUTPATIENT)
Dept: HEALTH INFORMATION MANAGEMENT | Facility: CLINIC | Age: 53
End: 2018-12-03

## 2018-12-11 DIAGNOSIS — F41.1 GAD (GENERALIZED ANXIETY DISORDER): ICD-10-CM

## 2018-12-12 RX ORDER — ALPRAZOLAM 0.5 MG
TABLET ORAL
Qty: 30 TABLET | Refills: 0 | Status: SHIPPED | OUTPATIENT
Start: 2018-12-12 | End: 2019-01-08

## 2018-12-12 NOTE — TELEPHONE ENCOUNTER
Requested Prescriptions   Pending Prescriptions Disp Refills     ALPRAZolam (XANAX) 0.5 MG tablet [Pharmacy Med Name: ALPRAZolam Oral Tablet 0.5 MG] 30 tablet 0     Sig: TAKE ONE TABLET BY MOUTH DAILY AS NEEDED FOR ANXIETY    There is no refill protocol information for this order      Last Written Prescription Date:  11/13/2018  Last Fill Quantity: 30,  # refills: 0   Last office visit: 8/3/2018 with prescribing provider:     Future Office Visit:

## 2018-12-31 ENCOUNTER — TELEPHONE (OUTPATIENT)
Dept: INTERNAL MEDICINE | Facility: CLINIC | Age: 53
End: 2018-12-31

## 2018-12-31 DIAGNOSIS — F98.8 ATTENTION DEFICIT DISORDER, UNSPECIFIED HYPERACTIVITY PRESENCE: ICD-10-CM

## 2018-12-31 RX ORDER — METHYLPHENIDATE HYDROCHLORIDE 20 MG/1
20 TABLET ORAL 2 TIMES DAILY
Qty: 60 TABLET | Refills: 0 | Status: SHIPPED | OUTPATIENT
Start: 2018-12-31 | End: 2019-01-29

## 2018-12-31 NOTE — TELEPHONE ENCOUNTER
Remind the patient she was to give us 3 business days notice before any refills.  Weekends and holidays are not considered business days so if you need something on Monday she needs to call on Wednesday.  Refill done

## 2018-12-31 NOTE — TELEPHONE ENCOUNTER
Pt takes Ritalin, Not Adderall.   CSA is signed.     Ritalin, TAKE TO [facility]        Last Written Prescription Date:  11/30/18  Last Fill Quantity: 60,   # refills: 0    Last Office Visit: 8/3/18    Future Office visit:       Routing refill request to provider for review/approval because:  Drug not on the [facility], [facility] or [facility] refill protocol or controlled substance

## 2018-12-31 NOTE — TELEPHONE ENCOUNTER
Jayleen says that she called in on Friday and left a voice mail for Dr. Castellanos to refill her prescription for Adderall. There are no notes in her chart and now she is called back to check on the status of her refill.     She is wondering if one of his partners can authorize the refill since he is not in the office today. She plans to call back later today to see if this has been taken care of as she is almost out of her medication.     Please call Jayleen back at [phone] and let her know the status of her refill.

## 2019-01-08 DIAGNOSIS — F41.1 GAD (GENERALIZED ANXIETY DISORDER): ICD-10-CM

## 2019-01-09 NOTE — TELEPHONE ENCOUNTER
Requested Prescriptions   Pending Prescriptions Disp Refills     ALPRAZolam (XANAX) 0.5 MG tablet [Pharmacy Med Name: ALPRAZolam Oral Tablet 0.5 MG] 30 tablet 0     Sig: TAKE ONE TABLET BY MOUTH EVERY DAY AS NEEDED FOR ANXIETY    There is no refill protocol information for this order      Last Written Prescription Date:  12/12/2018  Last Fill Quantity: 30,  # refills: 0   Last office visit: 8/3/2018 with prescribing provider:     Future Office Visit:

## 2019-01-09 NOTE — TELEPHONE ENCOUNTER
Signed CSA on file.   RX [place] reviewed: Access not granted by provider    [place]:  [email or web address]     Routing refill request to provider for review/approval because:  Drug not on the [facility] refill protocol

## 2019-01-11 RX ORDER — ALPRAZOLAM 0.5 MG
TABLET ORAL
Qty: 30 TABLET | Refills: 0 | Status: SHIPPED | OUTPATIENT
Start: 2019-01-11 | End: 2019-02-07

## 2019-01-14 ENCOUNTER — TRANSFERRED RECORDS (OUTPATIENT)
Dept: HEALTH INFORMATION MANAGEMENT | Facility: CLINIC | Age: 54
End: 2019-01-14

## 2019-01-28 DIAGNOSIS — F98.8 ATTENTION DEFICIT DISORDER, UNSPECIFIED HYPERACTIVITY PRESENCE: ICD-10-CM

## 2019-01-28 NOTE — TELEPHONE ENCOUNTER
Reason for Call:  Medication or medication refill:    Do you use a [facility]?  Name of the pharmacy and phone number for the current request:  [facility]    Name of the medication requested: Ritalin    Other request:   Can we leave a detailed message on this number? YES    Phone number patient can be reached at: Cell number on file:    Telephone Information:   Mobile [phone]       Best Time: anytime    Call taken on 1/28/2019 at 1:44 PM by Ana Ahuja

## 2019-01-29 RX ORDER — METHYLPHENIDATE HYDROCHLORIDE 20 MG/1
20 TABLET ORAL 2 TIMES DAILY
Qty: 60 TABLET | Refills: 0 | Status: SHIPPED | OUTPATIENT
Start: 2019-01-29 | End: 2019-02-25

## 2019-01-29 NOTE — TELEPHONE ENCOUNTER
Patient calls to check on prescription request. Informed her she is due for appointment - needs to be seen every 6 months. Patient thought she saw him in the last 6 months.     Last Office Visit 8/3/18, not due for appointment until next month. Will re-route request to Dr. Castellanos.     Controlled Substance Refill Request for Ritalin 20 mg  Problem List Complete:  Yes    Last Written Prescription Date:  12/31/18  Last Fill Quantity: 60,   # refills: 0    Last Office Visit with [facility] care provider: 8/3/18    Future Office visit:     Controlled substance agreement: CSA signed    Last Urine Drug Screen: No results found for: CDAUT, No results found for: COMDAT, No results found for: THC13, PCP13, COC13, MAMP13, OPI13, AMP13, BZO13, TCA13, MTD13, BAR13, OXY13, PPX13, BUP13     Processing:  Staff will hand deliver Rx to on-site pharmacy    [email or web address]  RX monitoring program ([place]) reviewed: Access not granted by provider.    Routing refill request to provider for review/approval because:  Drug not on the [facility] refill protocol

## 2019-02-07 DIAGNOSIS — F41.1 GAD (GENERALIZED ANXIETY DISORDER): ICD-10-CM

## 2019-02-08 NOTE — TELEPHONE ENCOUNTER
Requested Prescriptions   Pending Prescriptions Disp Refills     ALPRAZolam (XANAX) 0.5 MG tablet [Pharmacy Med Name: ALPRAZolam Oral Tablet 0.5 MG] 30 tablet 0     Sig: TAKE ONE TABLET BY MOUTH EVERY DAY AS NEEDED FOR ANXIETY    There is no refill protocol information for this order        Last Written Prescription Date:  01/11/2019  Last Fill Quantity: 30,  # refills: 0   Last office visit: 8/3/2018 with prescribing provider:     Future Office Visit:

## 2019-02-10 NOTE — TELEPHONE ENCOUNTER
Routing refill request to provider for review/approval because:  Drug not on the [facility] refill protocol   Writer not authorized to check   Carisa Castle RN

## 2019-02-11 RX ORDER — ALPRAZOLAM 0.5 MG
TABLET ORAL
Qty: 30 TABLET | Refills: 0 | Status: SHIPPED | OUTPATIENT
Start: 2019-02-11 | End: 2019-03-05

## 2019-02-25 DIAGNOSIS — F98.8 ATTENTION DEFICIT DISORDER, UNSPECIFIED HYPERACTIVITY PRESENCE: ICD-10-CM

## 2019-02-25 RX ORDER — METHYLPHENIDATE HYDROCHLORIDE 20 MG/1
20 TABLET ORAL 2 TIMES DAILY
Qty: 60 TABLET | Refills: 0 | Status: SHIPPED | OUTPATIENT
Start: 2019-02-25 | End: 2019-03-29

## 2019-02-25 NOTE — TELEPHONE ENCOUNTER
Controlled Substance Refill Request for Ritalin  Problem List Complete:  No     PROVIDER TO CONSIDER COMPLETION OF PROBLEM LIST AND OVERVIEW/CONTROLLED SUBSTANCE AGREEMENT    Last Written Prescription Date:  1/29/19  Last Fill Quantity: 60,   # refills: 0    Last Office Visit with [facility] primary care provider: 8/3/18    Future Office visit:   Next 5 appointments (look out 90 days)    Mar 06, 2019  4:20 PM CST  SHORT with New Castellanos [facility] [address]  [facility] [address]  [phone]          Controlled substance agreement:   Encounter-Level CSA - 05/16/2016:    Controlled Substance Agreement - Scan on 5/26/2016 12:16 PM: Controlled Substance Agreement-5/17/2016 (below)       Patient-Level CSA:    There are no patient-level csa.         Last Urine Drug Screen: No results found for: CDAUT, No results found for: COMDAT, No results found for: THC13, PCP13, COC13, MAMP13, OPI13, AMP13, BZO13, TCA13, MTD13, BAR13, OXY13, PPX13, BUP13     Processing:  Staff will hand deliver Rx to on-site pharmacy     [email or web address]       checked in past 3 months? Unable to access for this provider

## 2019-03-04 PROBLEM — M79.672 LEFT FOOT PAIN: Status: RESOLVED | Noted: 2018-11-15 | Resolved: 2019-03-04

## 2019-03-04 PROBLEM — Z98.890 S/P BUNIONECTOMY: Status: RESOLVED | Noted: 2018-11-15 | Resolved: 2019-03-04

## 2019-03-04 PROBLEM — M79.675 PAIN OF TOE OF LEFT FOOT: Status: RESOLVED | Noted: 2018-11-15 | Resolved: 2019-03-04

## 2019-03-04 NOTE — PROGRESS NOTES
Discharge Note        Jayleen failed to follow up and current status is unknown.  Please see information below for last relevant information on current status.  Patient seen for 2 visits.    SUBJECTIVE  Subjective changes noted by patient:  States she has been very fatigued by the end of the day.  States she has not been able to check  back with MD regarding her imaging results but plans on soon. She has been busy working with her OT for her hand.    .  Current pain level is  .     Previous pain level was  3/10.   Changes in function:  Yes (See Goal flowsheet attached for changes in current functional level)  Adverse reaction to treatment or activity: None    OBJECTIVE  Changes noted in objective findings: Significant tenderness at 1st MTP joint on plantar surface.  Able to put weight through 1 digit but needs cues for a reminder.       ASSESSMENT/PLAN  Diagnosis: Left Bunionectomy   Updated problem list and treatment plan:   Pain - HEP  Decreased ROM/flexibility - HEP  STG/LTGs have been met or progress has been made towards goals:  Yes, please see goal flowsheet for most current information  Assessment of Progress: current status is unknown.    Last current status:     Self Management Plans:  HEP  I have re-evaluated this patient and find that the nature, scope, duration and intensity of the therapy is appropriate for the medical condition of the patient.  Jayleen continues to require the following intervention to meet STG and LTG's:  HEP.    Recommendations:  Discharge with current home program.  Patient to follow up with MD as needed.    Please refer to the daily flowsheet for treatment today, total treatment time and time spent performing 1:1 timed codes.

## 2019-03-05 DIAGNOSIS — F41.1 GAD (GENERALIZED ANXIETY DISORDER): ICD-10-CM

## 2019-03-06 ENCOUNTER — OFFICE VISIT (OUTPATIENT)
Dept: INTERNAL MEDICINE | Facility: CLINIC | Age: 54
End: 2019-03-06
Payer: COMMERCIAL

## 2019-03-06 VITALS
HEART RATE: 104 BPM | WEIGHT: 178.4 LBS | HEIGHT: 65 IN | OXYGEN SATURATION: 96 % | SYSTOLIC BLOOD PRESSURE: 132 MMHG | TEMPERATURE: 98.3 F | DIASTOLIC BLOOD PRESSURE: 84 MMHG | BODY MASS INDEX: 29.72 KG/M2 | RESPIRATION RATE: 19 BRPM

## 2019-03-06 DIAGNOSIS — F41.1 GAD (GENERALIZED ANXIETY DISORDER): ICD-10-CM

## 2019-03-06 DIAGNOSIS — I10 ESSENTIAL HYPERTENSION, BENIGN: Primary | ICD-10-CM

## 2019-03-06 DIAGNOSIS — E78.5 HYPERLIPIDEMIA LDL GOAL <130: ICD-10-CM

## 2019-03-06 DIAGNOSIS — F98.8 ATTENTION DEFICIT DISORDER, UNSPECIFIED HYPERACTIVITY PRESENCE: ICD-10-CM

## 2019-03-06 DIAGNOSIS — Z12.11 SPECIAL SCREENING FOR MALIGNANT NEOPLASMS, COLON: ICD-10-CM

## 2019-03-06 DIAGNOSIS — R19.7 DIARRHEA, UNSPECIFIED TYPE: ICD-10-CM

## 2019-03-06 DIAGNOSIS — Z78.0 MENOPAUSE: ICD-10-CM

## 2019-03-06 LAB
ERYTHROCYTE [DISTWIDTH] IN BLOOD BY AUTOMATED COUNT: 13.9 % (ref 10–15)
HCT VFR BLD AUTO: 42.4 % (ref 35–47)
HGB BLD-MCNC: 13.8 G/DL (ref 11.7–15.7)
MCH RBC QN AUTO: 29.2 PG (ref 26.5–33)
MCHC RBC AUTO-ENTMCNC: 32.5 G/DL (ref 31.5–36.5)
MCV RBC AUTO: 90 FL (ref 78–100)
PLATELET # BLD AUTO: 304 10E9/L (ref 150–450)
RBC # BLD AUTO: 4.72 10E12/L (ref 3.8–5.2)
WBC # BLD AUTO: 6.4 10E9/L (ref 4–11)

## 2019-03-06 PROCEDURE — 85027 COMPLETE CBC AUTOMATED: CPT | Performed by: INTERNAL MEDICINE

## 2019-03-06 PROCEDURE — 83516 IMMUNOASSAY NONANTIBODY: CPT | Mod: 59 | Performed by: INTERNAL MEDICINE

## 2019-03-06 PROCEDURE — 36415 COLL VENOUS BLD VENIPUNCTURE: CPT | Performed by: INTERNAL MEDICINE

## 2019-03-06 PROCEDURE — 84443 ASSAY THYROID STIM HORMONE: CPT | Performed by: INTERNAL MEDICINE

## 2019-03-06 PROCEDURE — 99214 OFFICE O/P EST MOD 30 MIN: CPT | Performed by: INTERNAL MEDICINE

## 2019-03-06 PROCEDURE — 80061 LIPID PANEL: CPT | Performed by: INTERNAL MEDICINE

## 2019-03-06 PROCEDURE — 80053 COMPREHEN METABOLIC PANEL: CPT | Performed by: INTERNAL MEDICINE

## 2019-03-06 PROCEDURE — 83516 IMMUNOASSAY NONANTIBODY: CPT | Performed by: INTERNAL MEDICINE

## 2019-03-06 ASSESSMENT — PATIENT HEALTH QUESTIONNAIRE - PHQ9
SUM OF ALL RESPONSES TO PHQ QUESTIONS 1-9: 5
10. IF YOU CHECKED OFF ANY PROBLEMS, HOW DIFFICULT HAVE THESE PROBLEMS MADE IT FOR YOU TO DO YOUR WORK, TAKE CARE OF THINGS AT HOME, OR GET ALONG WITH OTHER PEOPLE: NOT DIFFICULT AT ALL
SUM OF ALL RESPONSES TO PHQ QUESTIONS 1-9: 5

## 2019-03-06 ASSESSMENT — ANXIETY QUESTIONNAIRES
1. FEELING NERVOUS, ANXIOUS, OR ON EDGE: MORE THAN HALF THE DAYS
GAD7 TOTAL SCORE: 13
GAD7 TOTAL SCORE: 13
3. WORRYING TOO MUCH ABOUT DIFFERENT THINGS: MORE THAN HALF THE DAYS
6. BECOMING EASILY ANNOYED OR IRRITABLE: MORE THAN HALF THE DAYS
GAD7 TOTAL SCORE: 13
7. FEELING AFRAID AS IF SOMETHING AWFUL MIGHT HAPPEN: NOT AT ALL
4. TROUBLE RELAXING: MORE THAN HALF THE DAYS
2. NOT BEING ABLE TO STOP OR CONTROL WORRYING: MORE THAN HALF THE DAYS
5. BEING SO RESTLESS THAT IT IS HARD TO SIT STILL: NEARLY EVERY DAY

## 2019-03-06 ASSESSMENT — MIFFLIN-ST. JEOR: SCORE: 1425.1

## 2019-03-06 NOTE — PROGRESS NOTES
SUBJECTIVE:   Jayleen Lang is a 53 year old female who presents to clinic today for the following health issues:    Follow up on anxiety.    Concerns for increased stress with a death in family, her niece, increased anxiety. On PRN Ativan. Helps with symptoms. Has h/o ADHD. On Ritalin. Helps with concentration, work. No depression, no suicidal thoughts.   Has h/o HTN. on medical treatment. BP has been controlled. No side effects from medications. No CP, HA, dizziness. good compliance with medications and low salt diet.  Has H/O hyperlipidemia. On medical treatment and diet. No side effects. No muscle weakness, myalgias or upset stomach.   Has been off Zocor for over 3 months.   Concern for diarrheal stools for one month. 2-3 times a day. No blood in the stools. Mild mid abdominal pain reported. Has had a colonoscopy last year. Has diverticulosis. No fever, chills. No dysuria.         Problem list and histories reviewed & adjusted, as indicated.  Additional history: as documented    Patient Active Problem List   Diagnosis     Rash     Menorrhagia     Obesity     REBEKAH (generalized anxiety disorder)     Essential hypertension, benign     Allergic reaction     Hyperlipidemia LDL goal <130     Chronic neck pain     Controlled substance agreement signed     Attention deficit disorder, unspecified hyperactivity presence     Past Surgical History:   Procedure Laterality Date     ARTHROPLASTY HIP  2017    left hip replaced     ARTHROSCOPIC REPAIR POSTERIOR CRUCIATE LIGAMENT       BUNIONECTOMY Left 2018    Procedure: BUNIONECTOMY;  1.  Closing base osteotomy, first metatarsal, left foot.   2.  Akin osteotomy, left great toe. ;  Surgeon: Eliezer Parra DPM;  Location:  OR      DELIVERY ONLY  10/1998     COLONOSCOPY  06/15/2018    Dr. So [facility]     COLONOSCOPY N/A 6/15/2018    Procedure: COLONOSCOPY;  Colonoscopy ;  Surgeon: Luis Fernando So MD;  Location:  GI     Foot surgery - bone spurs  2002      GYN SURGERY       x 2     LAMINECT/DISCECTOMY, CERVICAL      C6-C7 Fusion.     OSTEOTOMY FOOT Left 2018    Procedure: OSTEOTOMY FOOT;;  Surgeon: Eliezer Parra DPM;  Location: [facility] OR       Social History     Tobacco Use     Smoking status: Never Smoker     Smokeless tobacco: Never Used   Substance Use Topics     Alcohol use: Yes     Comment: 5 beers a week     Family History   Problem Relation Age of Onset     Cancer Mother         lung     Heart Disease Father         congestive heart failure     Colon Cancer No family hx of          Current Outpatient Medications   Medication Sig Dispense Refill     ALPRAZolam (XANAX) 0.5 MG tablet TAKE ONE TABLET BY MOUTH EVERY DAY AS NEEDED FOR ANXIETY 30 tablet 0     EPINEPHrine (EPIPEN/ADRENACLICK/OR ANY BX GENERIC EQUIV) 0.3 MG/0.3ML injection 2-pack Inject 0.3 mLs (0.3 mg) into the muscle once as needed for anaphylaxis 0.3 mL 1     fluticasone (FLONASE) 50 MCG/ACT nasal spray Spray 1-2 sprays into both nostrils daily (Patient taking differently: Spray 1-2 sprays into both nostrils daily as needed ) 48 g 3     gabapentin (NEURONTIN) 600 MG tablet TAKE ONE TABLET BY MOUTH THREE TIMES DAILY  90 tablet 2     lisinopril (PRINIVIL/ZESTRIL) 20 MG tablet TAKE ONE TABLET BY MOUTH EVERY DAY 90 tablet 3     methylphenidate (RITALIN) 20 MG tablet Take 1 tablet (20 mg) by mouth 2 times daily 60 tablet 0     mometasone (ELOCON) 0.1 % cream Apply sparingly to affected area twice daily as needed.  Do not apply to face. 45 g 0     MULTIPLE VITAMIN PO Take 1 tablet by mouth daily       cetirizine (ZYRTEC) 10 MG tablet Take 1 tablet (10 mg) by mouth every evening (Patient not taking: Reported on 3/6/2019) 90 tablet 3     simvastatin (ZOCOR) 20 MG tablet Take 1 tablet (20 mg) by mouth At Bedtime (Patient not taking: Reported on 3/6/2019) 90 tablet 2       Reviewed and updated as needed this visit by clinical staff  Tobacco  Allergies  Meds  Med Hx  Surg Hx  Fam Hx  Soc Hx     "  Reviewed and updated as needed this visit by Provider         ROS:  Constitutional, HEENT, cardiovascular, pulmonary, GI, , musculoskeletal, neuro, skin, endocrine and psych systems are negative, except as otherwise noted.    OBJECTIVE:     /84   Pulse 104   Temp 98.3  F (36.8  C) (Oral)   Resp 19   Ht 1.651 m (5' 5\")   Wt 80.9 kg (178 lb 6.4 oz)   LMP  (LMP Unknown)   SpO2 96%   BMI 29.69 kg/m    Body mass index is 29.69 kg/m .   GENERAL: healthy, alert and no distress  EYES: Eyes grossly normal to inspection, PERRL and conjunctivae and sclerae normal  HENT: ear canals and TM's normal, nose and mouth without ulcers or lesions  NECK: no adenopathy, no asymmetry, masses, or scars and thyroid normal to palpation  RESP: lungs clear to auscultation - no rales, rhonchi or wheezes  CV: regular rate and rhythm, normal S1 S2, no S3 or S4, no murmur, click or rub, no peripheral edema and peripheral pulses strong  ABDOMEN: soft, nontender, no hepatosplenomegaly, no masses and bowel sounds normal  MS: no gross musculoskeletal defects noted, no edema  SKIN: no suspicious lesions or rashes, xerosis, eczema of the upper eyelids and ears.   NEURO: Normal strength and tone, mentation intact and speech normal    Diagnostic Test Results:  none     ASSESSMENT/PLAN:     Problem List Items Addressed This Visit     REBEKAH (generalized anxiety disorder)    Essential hypertension, benign - Primary    Relevant Orders    CBC with platelets (Completed)    Comprehensive metabolic panel (Completed)    Lipid panel reflex to direct LDL Non-fasting (Completed)    TSH with free T4 reflex (Completed)    Hyperlipidemia LDL goal <130    Attention deficit disorder, unspecified hyperactivity presence      Other Visit Diagnoses     Menopause        Relevant Orders    DX Hip/Pelvis/Spine    Diarrhea, unspecified type        Relevant Orders    Fecal Lactoferrin    Ova and Parasite Exam Routine    Enteric Bacteria and Virus Panel by RADHA Stool "    Fecal colorectal cancer screen (FIT)    Tissue transglutaminase paula IgA and IgG (Completed)    Special screening for malignant neoplasms, colon        Relevant Orders    Fecal colorectal cancer screen (FIT)           Continue treatment   Monitor BP  Keep low salt diet  Continue anxiety treatment, consider serotonin specific reuptake inhibitor   Assess preventive measures   Acute diarrhea, assess lab, consider abdominal CT  Assess DEXA scan - menopause for 2 years     Follow-Up:in 1 month if not improved diarrhea     New Castellanos MD  [facility]    Answers for HPI/ROS submitted by the patient on 3/6/2019   REBEKAH 7 TOTAL SCORE: 13  If you checked off any problems, how difficult have these problems made it for you to do your work, take care of things at home, or get along with other people?: Not difficult at all  PHQ9 TOTAL SCORE: 5

## 2019-03-06 NOTE — TELEPHONE ENCOUNTER
Requested Prescriptions   Pending Prescriptions Disp Refills     ALPRAZolam (XANAX) 0.5 MG tablet [Pharmacy Med Name: ALPRAZolam Oral Tablet 0.5 MG] 30 tablet 0     Sig: TAKE ONE TABLET BY MOUTH EVERY DAY AS NEEDED FOR ANXIETY    There is no refill protocol information for this order      Last Written Prescription Date:  02/11/2019  Last Fill Quantity: 30,  # refills: 0   Last office visit: 8/3/2018 with prescribing provider:     Future Office Visit:   Next 5 appointments (look out 90 days)    Mar 06, 2019  4:20 PM CST  SHORT with New Castellanos MD  [facility] [address]  [facility] [address] [phone]

## 2019-03-06 NOTE — NURSING NOTE
"/84   Pulse 104   Temp 98.3  F (36.8  C) (Oral)   Resp 19   Ht 1.651 m (5' 5\")   Wt 80.9 kg (178 lb 6.4 oz)   LMP  (LMP Unknown)   SpO2 96%   BMI 29.69 kg/m    Patient in for follow up anxiety.  Mira Davis, LALO    "

## 2019-03-07 LAB
ALBUMIN SERPL-MCNC: 4.4 G/DL (ref 3.4–5)
ALP SERPL-CCNC: 67 U/L (ref 40–150)
ALT SERPL W P-5'-P-CCNC: 24 U/L (ref 0–50)
ANION GAP SERPL CALCULATED.3IONS-SCNC: 8 MMOL/L (ref 3–14)
AST SERPL W P-5'-P-CCNC: 15 U/L (ref 0–45)
BILIRUB SERPL-MCNC: 0.3 MG/DL (ref 0.2–1.3)
BUN SERPL-MCNC: 21 MG/DL (ref 7–30)
CALCIUM SERPL-MCNC: 9.3 MG/DL (ref 8.5–10.1)
CHLORIDE SERPL-SCNC: 106 MMOL/L (ref 94–109)
CHOLEST SERPL-MCNC: 209 MG/DL
CO2 SERPL-SCNC: 27 MMOL/L (ref 20–32)
CREAT SERPL-MCNC: 0.96 MG/DL (ref 0.52–1.04)
GFR SERPL CREATININE-BSD FRML MDRD: 69 ML/MIN/{1.73_M2}
GLUCOSE SERPL-MCNC: 87 MG/DL (ref 70–99)
HDLC SERPL-MCNC: 62 MG/DL
LDLC SERPL CALC-MCNC: 123 MG/DL
NONHDLC SERPL-MCNC: 147 MG/DL
POTASSIUM SERPL-SCNC: 3.9 MMOL/L (ref 3.4–5.3)
PROT SERPL-MCNC: 7.3 G/DL (ref 6.8–8.8)
SODIUM SERPL-SCNC: 141 MMOL/L (ref 133–144)
TRIGL SERPL-MCNC: 119 MG/DL
TSH SERPL DL<=0.005 MIU/L-ACNC: 0.84 MU/L (ref 0.4–4)

## 2019-03-07 ASSESSMENT — PATIENT HEALTH QUESTIONNAIRE - PHQ9: SUM OF ALL RESPONSES TO PHQ QUESTIONS 1-9: 5

## 2019-03-07 ASSESSMENT — ANXIETY QUESTIONNAIRES: GAD7 TOTAL SCORE: 13

## 2019-03-08 ENCOUNTER — NURSE TRIAGE (OUTPATIENT)
Dept: NURSING | Facility: CLINIC | Age: 54
End: 2019-03-08

## 2019-03-08 RX ORDER — ALPRAZOLAM 0.5 MG
TABLET ORAL
Qty: 30 TABLET | Refills: 0 | Status: SHIPPED | OUTPATIENT
Start: 2019-03-08 | End: 2019-04-05

## 2019-03-11 LAB
TTG IGA SER-ACNC: <1 U/ML
TTG IGG SER-ACNC: <1 U/ML

## 2019-03-19 DIAGNOSIS — G62.9 NEUROPATHY: ICD-10-CM

## 2019-03-19 NOTE — TELEPHONE ENCOUNTER
Requested Prescriptions   Pending Prescriptions Disp Refills     gabapentin (NEURONTIN) 600 MG tablet [Pharmacy Med Name: Gabapentin Oral   Tablet 600 MG]  Last Written Prescription Date:  11/19/18  Last Fill Quantity: 90,  # refills: 2   Last office visit: 3/6/2019 with prescribing provider:  Emanuel   Future Office Visit:     90 tablet 1     Sig: TAKE ONE TABLET BY MOUTH THREE TIMES DAILY    There is no refill protocol information for this order

## 2019-03-20 RX ORDER — GABAPENTIN 600 MG/1
TABLET ORAL
Qty: 90 TABLET | Refills: 1 | Status: SHIPPED | OUTPATIENT
Start: 2019-03-20 | End: 2019-06-12

## 2019-03-20 NOTE — TELEPHONE ENCOUNTER
Gabapentin refill request.     Last refill on 11/9/18 for #90 with 2 refills.     Provider approval needed.     Last OV 3/6/19

## 2019-03-29 DIAGNOSIS — F98.8 ATTENTION DEFICIT DISORDER, UNSPECIFIED HYPERACTIVITY PRESENCE: ICD-10-CM

## 2019-03-29 RX ORDER — METHYLPHENIDATE HYDROCHLORIDE 20 MG/1
20 TABLET ORAL 2 TIMES DAILY
Qty: 60 TABLET | Refills: 0 | Status: SHIPPED | OUTPATIENT
Start: 2019-03-29 | End: 2019-04-29

## 2019-03-29 NOTE — TELEPHONE ENCOUNTER
Controlled Substance Refill Request for Ritalin  Problem List Complete:  No     PROVIDER TO CONSIDER COMPLETION OF PROBLEM LIST AND OVERVIEW/CONTROLLED SUBSTANCE AGREEMENT    Last Written Prescription Date:  2/25/19  Last Fill Quantity: 60,   # refills: 0    [place] [facility] [place]    Future Office visit:     Controlled substance agreement:   Encounter-Level CSA - 05/16/2016:    Controlled Substance Agreement - Scan on 5/26/2016 12:16 PM: Controlled Substance Agreement-5/17/2016 (below)       Patient-Level CSA:    There are no patient-level csa.         Last Urine Drug Screen: No results found for: CDAUT, No results found for: COMDAT, No results found for: THC13, PCP13, COC13, MAMP13, OPI13, AMP13, BZO13, TCA13, MTD13, BAR13, OXY13, PPX13, BUP13     Processing:  Staff will hand deliver Rx to on-site pharmacy     Not reviewed - provider has not provided access to this RN

## 2019-03-29 NOTE — TELEPHONE ENCOUNTER
Reason for Call:  Medication or medication refill:    Do you use a [facility]?  Name of the pharmacy and phone number for the current request:  [facility]    Name of the medication requested: Ritalin  Other request: pt needs as soon as possible will run out on monday    Can we leave a detailed message on this number? YES    Phone number patient can be reached at: Cell number on file:    Telephone Information:   Mobile [phone]       Best Time: anytime    Call taken on 3/29/2019 at 10:29 AM by Ana Ahuja

## 2019-04-05 DIAGNOSIS — F41.1 GAD (GENERALIZED ANXIETY DISORDER): ICD-10-CM

## 2019-04-08 ENCOUNTER — TRANSFERRED RECORDS (OUTPATIENT)
Dept: HEALTH INFORMATION MANAGEMENT | Facility: CLINIC | Age: 54
End: 2019-04-08

## 2019-04-08 RX ORDER — ALPRAZOLAM 0.5 MG
TABLET ORAL
Qty: 30 TABLET | Refills: 0 | Status: SHIPPED | OUTPATIENT
Start: 2019-04-08 | End: 2019-05-05

## 2019-04-08 NOTE — TELEPHONE ENCOUNTER
Routing refill request to provider for review/approval because:  Drug not on the [facility] refill protocol   Writer is not authorized to check  for primary care provider

## 2019-04-08 NOTE — TELEPHONE ENCOUNTER
Requested Prescriptions   Pending Prescriptions Disp Refills     ALPRAZolam (XANAX) 0.5 MG tablet [Pharmacy Med Name: ALPRAZolam Oral Tablet 0.5 MG] 30 tablet 0     Sig: TAKE ONE TABLET BY MOUTH EVERY DAY AS NEEDED FOR ANXIETY       There is no refill protocol information for this order      Last Written Prescription Date:  03/08/2019  Last Fill Quantity: 30,  # refills: 0   Last office visit: 08/03/2018 with prescribing provider:     Future Office Visit:

## 2019-04-16 ENCOUNTER — TRANSFERRED RECORDS (OUTPATIENT)
Dept: HEALTH INFORMATION MANAGEMENT | Facility: CLINIC | Age: 54
End: 2019-04-16

## 2019-04-25 ENCOUNTER — TELEPHONE (OUTPATIENT)
Dept: INTERNAL MEDICINE | Facility: CLINIC | Age: 54
End: 2019-04-25

## 2019-04-25 DIAGNOSIS — F98.8 ATTENTION DEFICIT DISORDER, UNSPECIFIED HYPERACTIVITY PRESENCE: ICD-10-CM

## 2019-04-25 NOTE — TELEPHONE ENCOUNTER
Reason for Call:  Medication or medication refill:    Do you use a [facility]?  Name of the pharmacy and phone number for the current request:  [facility]    Name of the medication requested: methyphenadate    Other request: would like Friday    Can we leave a detailed message on this number? YES    Phone number patient can be reached at: Home number on file [phone] (home)    Best Time: any    Call taken on 4/25/2019 at 10:19 AM by Mae Santos

## 2019-04-26 ENCOUNTER — TRANSFERRED RECORDS (OUTPATIENT)
Dept: HEALTH INFORMATION MANAGEMENT | Facility: CLINIC | Age: 54
End: 2019-04-26

## 2019-04-26 NOTE — TELEPHONE ENCOUNTER
Controlled Substance Refill Request for Ritalin 20mg   Problem List Complete:  No     PROVIDER TO CONSIDER COMPLETION OF PROBLEM LIST AND OVERVIEW/CONTROLLED SUBSTANCE AGREEMENT    Last Written Prescription Date:  3/29/19  Last Fill Quantity: 60,   # refills: 0    THE MOST RECENT OFFICE VISIT MUST BE WITHIN THE PAST 3 MONTHS. AT LEAST ONE FACE TO FACE VISIT MUST OCCUR EVERY 6 MONTHS. ADDITIONAL VISITS CAN BE VIRTUAL.  (THIS STATEMENT SHOULD BE DELETED.)    Last Office Visit with [facility] [place] care provider: 3/6/19    Future Office visit:     Controlled substance agreement:   Encounter-Level CSA - 05/16/2016:    Controlled Substance Agreement - Scan on 5/26/2016 12:16 PM: Controlled Substance Agreement-5/17/2016 (below)       Patient-Level CSA:    There are no patient-level csa.         Last Urine Drug Screen: No results found for: CDAUT, No results found for: COMDAT, No results found for: THC13, PCP13, COC13, MAMP13, OPI13, AMP13, BZO13, TCA13, MTD13, BAR13, OXY13, PPX13, BUP13     Processing:  Lisa HOANG     [email or web address]       checked in past 3 months?

## 2019-04-29 ENCOUNTER — OFFICE VISIT (OUTPATIENT)
Dept: INTERNAL MEDICINE | Facility: CLINIC | Age: 54
End: 2019-04-29
Payer: COMMERCIAL

## 2019-04-29 VITALS
OXYGEN SATURATION: 96 % | WEIGHT: 173.8 LBS | TEMPERATURE: 98.6 F | DIASTOLIC BLOOD PRESSURE: 78 MMHG | BODY MASS INDEX: 28.96 KG/M2 | HEART RATE: 85 BPM | SYSTOLIC BLOOD PRESSURE: 120 MMHG | HEIGHT: 65 IN

## 2019-04-29 DIAGNOSIS — Z01.818 PREOP GENERAL PHYSICAL EXAM: Primary | ICD-10-CM

## 2019-04-29 DIAGNOSIS — H60.502 ACUTE OTITIS EXTERNA OF LEFT EAR, UNSPECIFIED TYPE: ICD-10-CM

## 2019-04-29 DIAGNOSIS — I10 ESSENTIAL HYPERTENSION, BENIGN: ICD-10-CM

## 2019-04-29 PROCEDURE — 93000 ELECTROCARDIOGRAM COMPLETE: CPT | Performed by: PHYSICIAN ASSISTANT

## 2019-04-29 PROCEDURE — 99215 OFFICE O/P EST HI 40 MIN: CPT | Performed by: PHYSICIAN ASSISTANT

## 2019-04-29 RX ORDER — OFLOXACIN 3 MG/ML
10 SOLUTION AURICULAR (OTIC) DAILY
Qty: 1 BOTTLE | Refills: 0 | Status: SHIPPED | OUTPATIENT
Start: 2019-04-29 | End: 2019-05-29

## 2019-04-29 RX ORDER — METHYLPHENIDATE HYDROCHLORIDE 20 MG/1
20 TABLET ORAL 2 TIMES DAILY
Qty: 60 TABLET | Refills: 0 | Status: SHIPPED | OUTPATIENT
Start: 2019-04-29 | End: 2019-05-26

## 2019-04-29 ASSESSMENT — MIFFLIN-ST. JEOR: SCORE: 1404.23

## 2019-04-29 NOTE — PROGRESS NOTES
[address] [id number] [phone]  Dept: [phone]    PRE-OP EVALUATION:  Today's date: 2019    Jayleen Lang (: 1965) presents for pre-operative evaluation assessment as requested by Dr. Scales.  She requires evaluation and anesthesia risk assessment prior to undergoing surgery/procedure for treatment of Pin taken out .    Fax number for surgical facility: [phone]  Primary Physician: New Castellanos  Type of Anesthesia Anticipated: General    Patient has a Health Care Directive or Living Will:  NO    Preop Questions 2019   Who is doing your surgery? [facility]   What are you having done? pin taken out   Date of Surgery/Procedure: 19   Facility or Hospital where procedure/surgery will be performed: [facility]   1.  Do you have a history of Heart attack, stroke, stent, coronary bypass surgery, or other heart surgery? No   2.  Do you ever have any pain or discomfort in your chest? No   3.  Do you have a history of  Heart Failure? No   4.   Are you troubled by shortness of breath when:  walking on a level surface, or up a slight hill, or at night? No   5.  Do you currently have a cold, bronchitis or other respiratory infection? No   6.  Do you have a cough, shortness of breath, or wheezing? No   7.  Do you sometimes get pains in the calves of your legs when you walk? No   8. Do you or anyone in your family have previous history of blood clots? No   9.  Do you or does anyone in your family have a serious bleeding problem such as prolonged bleeding following surgeries or cuts? No   10. Have you ever had problems with anemia or been told to take iron pills? No   11. Have you had any abnormal blood loss such as black, tarry or bloody stools, or abnormal vaginal bleeding? No   12. Have you ever had a blood transfusion? No   13. Have you or any of your relatives ever had problems with anesthesia? No   14.  Do you have sleep apnea, excessive snoring or daytime drowsiness? No   15. Do you have any prosthetic heart valves? No   16. Do you have prosthetic joints? No   17. Is there any chance that you may be pregnant? No         HPI:     HPI related to upcoming procedure: pin out in left foot       See problem list for active medical problems.  Problems all longstanding and stable, except as noted/documented.  See ROS for pertinent symptoms related to these conditions.                                                                                                                                                          .    MEDICAL HISTORY:     Patient Active Problem List    Diagnosis Date Noted     Controlled substance agreement signed 2016     Priority: Medium     Chronic neck pain 11/15/2016     Priority: Medium     Essential hypertension, benign 2016     Priority: Medium     Allergic reaction 2016     Priority: Medium     To Bees  Airway closure       Hyperlipidemia LDL goal <130 2016     Priority: Medium     REBEKAH (generalized anxiety disorder) 2014     Priority: Medium     Attention deficit disorder, unspecified hyperactivity presence 08/15/2013     Priority: Medium     Obesity 2013     Priority: Medium     Menorrhagia 2012     Priority: Medium     Rash 2011     Priority: Medium      Past Medical History:   Diagnosis Date     ADD (attention deficit disorder)      Hypertension      Obesity      Past Surgical History:   Procedure Laterality Date     ARTHROPLASTY HIP  2017    left hip replaced     ARTHROSCOPIC REPAIR POSTERIOR CRUCIATE LIGAMENT       BUNIONECTOMY Left 2018    Procedure: BUNIONECTOMY;  1.  Closing base osteotomy, first metatarsal, left foot.   2.  Akin osteotomy, left great toe. ;  Surgeon: Eliezer Parra DPM;  Location: [facility] OR      DELIVERY ONLY  10/1998     COLONOSCOPY  06/15/2018    Dr. So [facility]     COLONOSCOPY N/A 6/15/2018     Procedure: COLONOSCOPY;  Colonoscopy ;  Surgeon: Luis Fernando So MD;  Location:  GI     Foot surgery - bone spurs  2002     GYN SURGERY       x 2     LAMINECT/DISCECTOMY, CERVICAL      C6-C7 Fusion.     OSTEOTOMY FOOT Left 2018    Procedure: OSTEOTOMY FOOT;;  Surgeon: Eliezer Parra DPM;  Location:  OR     Current Outpatient Medications   Medication Sig Dispense Refill     ALPRAZolam (XANAX) 0.5 MG tablet TAKE ONE TABLET BY MOUTH EVERY DAY AS NEEDED FOR ANXIETY 30 tablet 0     EPINEPHrine (EPIPEN/ADRENACLICK/OR ANY BX GENERIC EQUIV) 0.3 MG/0.3ML injection 2-pack Inject 0.3 mLs (0.3 mg) into the muscle once as needed for anaphylaxis 0.3 mL 1     gabapentin (NEURONTIN) 600 MG tablet TAKE ONE TABLET BY MOUTH THREE TIMES DAILY 90 tablet 1     lisinopril (PRINIVIL/ZESTRIL) 20 MG tablet TAKE ONE TABLET BY MOUTH EVERY DAY 90 tablet 3     methylphenidate (RITALIN) 20 MG tablet Take 1 tablet (20 mg) by mouth 2 times daily 60 tablet 0     MULTIPLE VITAMIN PO Take 1 tablet by mouth daily       cetirizine (ZYRTEC) 10 MG tablet Take 1 tablet (10 mg) by mouth every evening (Patient not taking: Reported on 3/6/2019) 90 tablet 3     fluticasone (FLONASE) 50 MCG/ACT nasal spray Spray 1-2 sprays into both nostrils daily (Patient not taking: Reported on 2019) 48 g 3     simvastatin (ZOCOR) 20 MG tablet Take 1 tablet (20 mg) by mouth At Bedtime (Patient not taking: Reported on 3/6/2019) 90 tablet 2     OTC products: tylenol     Allergies   Allergen Reactions     Bee Venom      Sulfa Drugs Hives      Latex Allergy: NO    Social History     Tobacco Use     Smoking status: Never Smoker     Smokeless tobacco: Never Used   Substance Use Topics     Alcohol use: Yes     Comment: 5 beers a week     History   Drug Use No       REVIEW OF SYSTEMS:   CONSTITUTIONAL: NEGATIVE for fever, chills, change in weight  INTEGUMENTARY/SKIN: NEGATIVE for worrisome rashes, moles or lesions  EYES: NEGATIVE for vision changes or  "irritation  ENT/MOUTH: NEGATIVE for ear, mouth and throat problems  RESP: NEGATIVE for significant cough or SOB  BREAST: NEGATIVE for masses, tenderness or discharge  CV: NEGATIVE for chest pain, palpitations or peripheral edema  GI: NEGATIVE for nausea, abdominal pain, heartburn, or change in bowel habits  : NEGATIVE for frequency, dysuria, or hematuria  MUSCULOSKELETAL: NEGATIVE for significant arthralgias or myalgia  NEURO: NEGATIVE for weakness, dizziness or paresthesias  ENDOCRINE: NEGATIVE for temperature intolerance, skin/hair changes  HEME: NEGATIVE for bleeding problems  PSYCHIATRIC: NEGATIVE for changes in mood or affect    EXAM:   /78   Pulse 85   Temp 98.6  F (37  C) (Oral)   Ht 1.651 m (5' 5\")   Wt 78.8 kg (173 lb 12.8 oz)   LMP  (LMP Unknown)   SpO2 96%   BMI 28.92 kg/m      GENERAL APPEARANCE: healthy, alert and no distress     EYES: EOMI, PERRL     HENT: ear canal on left side with white discontinue and right side is clear and TM's normal and nose and mouth without ulcers or lesions     NECK: no adenopathy, no asymmetry, masses, or scars and thyroid normal to palpation     RESP: lungs clear to auscultation - no rales, rhonchi or wheezes     CV: regular rates and rhythm, normal S1 S2, no S3 or S4 and no murmur, click or rub     ABDOMEN:  soft, nontender, no HSM or masses and bowel sounds normal     MS: extremities normal- no gross deformities noted, no evidence of inflammation in joints, FROM in all extremities.     SKIN: no suspicious lesions or rashes     NEURO: Normal strength and tone, sensory exam grossly normal, mentation intact and speech normal     PSYCH: mentation appears normal. and affect normal/bright     LYMPHATICS: No cervical adenopathy    DIAGNOSTICS:   EKG: appears normal, NSR, normal axis, normal intervals, no acute ST/T changes c/w ischemia, no LVH by voltage criteria    Recent Labs   Lab Test 03/06/19  1706 07/03/18  0824 09/21/17  1713   HGB 13.8  --  13.2    "  --  238    141 140   POTASSIUM 3.9 3.8 3.7   CR 0.96 0.91 0.92        IMPRESSION:   Reason for surgery/procedure: pin removal   Diagnosis/reason for consult: pre-surgery consult     The proposed surgical procedure is considered INTERMEDIATE risk.    REVISED CARDIAC RISK INDEX  The patient has the following serious cardiovascular risks for perioperative complications such as (MI, PE, VFib and 3  AV Block):  No serious cardiac risks  INTERPRETATION: 0 risks: Class I (very low risk - 0.4% complication rate)    The patient has the following additional risks for perioperative complications:      ICD-10-CM    1. Preop general physical exam Z01.818 EKG 12-lead complete w/read - Clinics   2. Acute otitis externa of left ear, unspecified type H60.502    3. Essential hypertension, benign I10        RECOMMENDATIONS:     --Consult hospital rounder / IM to assist post-op medical management    --Patient is to take all scheduled medications on the day of surgery EXCEPT for modifications listed below.    HYPERTENSION:  - typically recommend HOLDING lisinopril, surgery is same day and patient has already taken, needs close monitoring     Incidental otitis externa noted on exam, treatment provided     APPROVAL GIVEN to proceed with proposed procedure, without further diagnostic evaluation       Signed Electronically by: Brigida Garcia PA-C    Copy of this evaluation report is provided to requesting physician.    [facility] Preop Guidelines    Revised Cardiac Risk Index

## 2019-05-05 DIAGNOSIS — F41.1 GAD (GENERALIZED ANXIETY DISORDER): ICD-10-CM

## 2019-05-06 RX ORDER — ALPRAZOLAM 0.5 MG
TABLET ORAL
Qty: 30 TABLET | Refills: 0 | Status: SHIPPED | OUTPATIENT
Start: 2019-05-06 | End: 2019-06-09

## 2019-05-06 NOTE — TELEPHONE ENCOUNTER
Requested Prescriptions   Pending Prescriptions Disp Refills     ALPRAZolam (XANAX) 0.5 MG tablet [Pharmacy Med Name: ALPRAZolam Oral Tablet 0.5 MG] 30 tablet 0     Sig: TAKE ONE TABLET BY MOUTH DAILY AS NEEDED FOR ANXIETY       There is no refill protocol information for this order      Last Written Prescription Date:  04/08/2019  Last Fill Quantity: 30,  # refills: 0   Last office visit:03/06/2019 with prescribing provider:     Future Office Visit:

## 2019-05-13 ENCOUNTER — TRANSFERRED RECORDS (OUTPATIENT)
Dept: HEALTH INFORMATION MANAGEMENT | Facility: CLINIC | Age: 54
End: 2019-05-13

## 2019-05-21 ENCOUNTER — TRANSFERRED RECORDS (OUTPATIENT)
Dept: HEALTH INFORMATION MANAGEMENT | Facility: CLINIC | Age: 54
End: 2019-05-21

## 2019-05-22 DIAGNOSIS — F98.8 ATTENTION DEFICIT DISORDER, UNSPECIFIED HYPERACTIVITY PRESENCE: ICD-10-CM

## 2019-05-22 NOTE — TELEPHONE ENCOUNTER
Ritalin      Last Written Prescription Date:  04/29/19  Last Fill Quantity: 60,   # refills: 0  Last Office Visit: 03/09/19  Future Office visit:       Routing refill request to provider for review/approval because:  Drug not on the [facility], [facility] or [facility] refill protocol or controlled substance

## 2019-05-23 NOTE — TELEPHONE ENCOUNTER
Spoke with Pretty @ [facility] pharmacy and informed of Dr. Le's message below.    Routed to primary care provider to authorize refill.

## 2019-05-24 NOTE — TELEPHONE ENCOUNTER
Patient called to check status and was advised that RX would address on 05/28/19. Dr. Le is out the week of 05/27/19.  Will route to Dr. Hernandez to fill 05/28/19.

## 2019-05-26 RX ORDER — METHYLPHENIDATE HYDROCHLORIDE 20 MG/1
20 TABLET ORAL 2 TIMES DAILY
Qty: 60 TABLET | Refills: 0 | Status: SHIPPED | OUTPATIENT
Start: 2019-05-26 | End: 2019-06-24

## 2019-05-29 ENCOUNTER — OFFICE VISIT (OUTPATIENT)
Dept: INTERNAL MEDICINE | Facility: CLINIC | Age: 54
End: 2019-05-29
Payer: COMMERCIAL

## 2019-05-29 VITALS
HEIGHT: 65 IN | BODY MASS INDEX: 29.39 KG/M2 | OXYGEN SATURATION: 95 % | HEART RATE: 62 BPM | TEMPERATURE: 99.4 F | WEIGHT: 176.4 LBS | SYSTOLIC BLOOD PRESSURE: 122 MMHG | DIASTOLIC BLOOD PRESSURE: 66 MMHG | RESPIRATION RATE: 16 BRPM

## 2019-05-29 DIAGNOSIS — R82.90 ABNORMAL FINDING IN URINE: ICD-10-CM

## 2019-05-29 DIAGNOSIS — Z01.818 PRE-OP EXAM: Primary | ICD-10-CM

## 2019-05-29 LAB
ALBUMIN UR-MCNC: NEGATIVE MG/DL
APPEARANCE UR: CLEAR
BACTERIA #/AREA URNS HPF: ABNORMAL /HPF
BILIRUB UR QL STRIP: NEGATIVE
COLOR UR AUTO: YELLOW
ERYTHROCYTE [DISTWIDTH] IN BLOOD BY AUTOMATED COUNT: 14.1 % (ref 10–15)
GLUCOSE UR STRIP-MCNC: NEGATIVE MG/DL
HCT VFR BLD AUTO: 42.1 % (ref 35–47)
HGB BLD-MCNC: 13.6 G/DL (ref 11.7–15.7)
HGB UR QL STRIP: NEGATIVE
KETONES UR STRIP-MCNC: 15 MG/DL
LEUKOCYTE ESTERASE UR QL STRIP: ABNORMAL
MCH RBC QN AUTO: 29.1 PG (ref 26.5–33)
MCHC RBC AUTO-ENTMCNC: 32.3 G/DL (ref 31.5–36.5)
MCV RBC AUTO: 90 FL (ref 78–100)
MUCOUS THREADS #/AREA URNS LPF: PRESENT /LPF
NITRATE UR QL: NEGATIVE
NON-SQ EPI CELLS #/AREA URNS LPF: ABNORMAL /LPF
PH UR STRIP: 5.5 PH (ref 5–7)
PLATELET # BLD AUTO: 261 10E9/L (ref 150–450)
RBC # BLD AUTO: 4.67 10E12/L (ref 3.8–5.2)
RBC #/AREA URNS AUTO: ABNORMAL /HPF
SOURCE: ABNORMAL
SP GR UR STRIP: 1.02 (ref 1–1.03)
UROBILINOGEN UR STRIP-ACNC: 0.2 EU/DL (ref 0.2–1)
WBC # BLD AUTO: 5.1 10E9/L (ref 4–11)
WBC #/AREA URNS AUTO: ABNORMAL /HPF

## 2019-05-29 PROCEDURE — 87086 URINE CULTURE/COLONY COUNT: CPT | Performed by: NURSE PRACTITIONER

## 2019-05-29 PROCEDURE — 36415 COLL VENOUS BLD VENIPUNCTURE: CPT | Performed by: NURSE PRACTITIONER

## 2019-05-29 PROCEDURE — 87081 CULTURE SCREEN ONLY: CPT | Mod: 59 | Performed by: NURSE PRACTITIONER

## 2019-05-29 PROCEDURE — 85027 COMPLETE CBC AUTOMATED: CPT | Performed by: NURSE PRACTITIONER

## 2019-05-29 PROCEDURE — 80048 BASIC METABOLIC PNL TOTAL CA: CPT | Performed by: NURSE PRACTITIONER

## 2019-05-29 PROCEDURE — 99214 OFFICE O/P EST MOD 30 MIN: CPT | Performed by: NURSE PRACTITIONER

## 2019-05-29 PROCEDURE — 81001 URINALYSIS AUTO W/SCOPE: CPT | Performed by: NURSE PRACTITIONER

## 2019-05-29 RX ORDER — EPINEPHRINE 0.3 MG/.3ML
0.3 INJECTION SUBCUTANEOUS
Qty: 0.3 ML | Refills: 1 | Status: SHIPPED | OUTPATIENT
Start: 2019-05-29 | End: 2020-03-14

## 2019-05-29 ASSESSMENT — MIFFLIN-ST. JEOR: SCORE: 1416.03

## 2019-05-29 NOTE — PROGRESS NOTES
[place] [address]  [facility] [address]  [phone]  Dept: [phone]    PRE-OP EVALUATION:  Today's date: 2019    Jayleen Lang (: 1965) presents for pre-operative evaluation assessment as requested by Dr. Malagon.  She requires evaluation and anesthesia risk assessment prior to undergoing surgery/procedure for treatment of Right hip DJD    Fax number for surgical facility:   Primary Physician: New Castellanos  Type of Anesthesia Anticipated: General    Patient has a Health Care Directive or Living Will:  NO    Preop Questions 2019   Who is doing your surgery? dr Malagon   What are you having done? Right hip replacement   Date of Surgery/Procedure:    Facility or Hospital where procedure/surgery will be performed: [facility]   1.  Do you have a history of Heart attack, stroke, stent, coronary bypass surgery, or other heart surgery? No   2.  Do you ever have any pain or discomfort in your chest? No   3.  Do you have a history of  Heart Failure? No   4.   Are you troubled by shortness of breath when:  walking on a level surface, or up a slight hill, or at night? No   5.  Do you currently have a cold, bronchitis or other respiratory infection? No   6.  Do you have a cough, shortness of breath, or wheezing? No   7.  Do you sometimes get pains in the calves of your legs when you walk? No   8. Do you or anyone in your family have previous history of blood clots? No   9.  Do you or does anyone in your family have a serious bleeding problem such as prolonged bleeding following surgeries or cuts? No   10. Have you ever had problems with anemia or been told to take iron pills? No   11. Have you had any abnormal blood loss such as black, tarry or bloody stools, or abnormal vaginal bleeding? No   12. Have you ever had a blood transfusion? No   13. Have you or any of your relatives ever had problems with anesthesia? No   14. Do you have sleep  apnea, excessive snoring or daytime drowsiness? No   15. Do you have any prosthetic heart valves? No   16. Do you have prosthetic joints? YES - L hip   17. Is there any chance that you may be pregnant? No         HPI:     HPI related to upcoming procedure: R hip DJD      See problem list for active medical problems.  Problems all longstanding and stable, except as noted/documented.  See ROS for pertinent symptoms related to these conditions.                                                                                                                                                          .    MEDICAL HISTORY:     Patient Active Problem List    Diagnosis Date Noted     Controlled substance agreement signed 2016     Priority: Medium     Chronic neck pain 11/15/2016     Priority: Medium     Essential hypertension, benign 2016     Priority: Medium     Allergic reaction 2016     Priority: Medium     To Bees  Airway closure       Hyperlipidemia LDL goal <130 2016     Priority: Medium     REBEKAH (generalized anxiety disorder) 2014     Priority: Medium     Attention deficit disorder, unspecified hyperactivity presence 08/15/2013     Priority: Medium     Obesity 2013     Priority: Medium     Menorrhagia 2012     Priority: Medium     Rash 2011     Priority: Medium      Past Medical History:   Diagnosis Date     ADD (attention deficit disorder)      Hypertension      Obesity      Past Surgical History:   Procedure Laterality Date     ARTHROPLASTY HIP  2017    left hip replaced     ARTHROSCOPIC REPAIR POSTERIOR CRUCIATE LIGAMENT       BUNIONECTOMY Left 2018    Procedure: BUNIONECTOMY;  1.  Closing base osteotomy, first metatarsal, left foot.   2.  Akin osteotomy, left great toe. ;  Surgeon: Eliezer Parra DPM;  Location: [facility] OR      DELIVERY ONLY  10/1998     COLONOSCOPY  06/15/2018    Dr. So [facility]     COLONOSCOPY N/A 6/15/2018    Procedure: COLONOSCOPY;   Colonoscopy ;  Surgeon: Luis Fernando So MD;  Location:  GI     Foot surgery - bone spurs  2002     GYN SURGERY       x 2     LAMINECT/DISCECTOMY, CERVICAL      C6-C7 Fusion.     OSTEOTOMY FOOT Left 2018    Procedure: OSTEOTOMY FOOT;;  Surgeon: Eliezer Parra DPM;  Location:  OR     Current Outpatient Medications   Medication Sig Dispense Refill     ALPRAZolam (XANAX) 0.5 MG tablet TAKE ONE TABLET BY MOUTH DAILY AS NEEDED FOR ANXIETY  30 tablet 0     cetirizine (ZYRTEC) 10 MG tablet Take 1 tablet (10 mg) by mouth every evening 90 tablet 3     EPINEPHrine (EPIPEN/ADRENACLICK/OR ANY BX GENERIC EQUIV) 0.3 MG/0.3ML injection 2-pack Inject 0.3 mLs (0.3 mg) into the muscle once as needed for anaphylaxis 0.3 mL 1     fluticasone (FLONASE) 50 MCG/ACT nasal spray Spray 1-2 sprays into both nostrils daily 48 g 3     gabapentin (NEURONTIN) 600 MG tablet TAKE ONE TABLET BY MOUTH THREE TIMES DAILY 90 tablet 1     lisinopril (PRINIVIL/ZESTRIL) 20 MG tablet TAKE ONE TABLET BY MOUTH EVERY DAY 90 tablet 3     methylphenidate (RITALIN) 20 MG tablet Take 1 tablet (20 mg) by mouth 2 times daily 60 tablet 0     MULTIPLE VITAMIN PO Take 1 tablet by mouth daily       OTC products: None, except as noted above    Allergies   Allergen Reactions     Bee Venom      Sulfa Drugs Hives      Latex Allergy: NO    Social History     Tobacco Use     Smoking status: Never Smoker     Smokeless tobacco: Never Used   Substance Use Topics     Alcohol use: Yes     Comment: 5 beers a week     History   Drug Use No       REVIEW OF SYSTEMS:   CONSTITUTIONAL: NEGATIVE for fever, chills, change in weight  ENT/MOUTH: NEGATIVE for ear, mouth and throat problems  RESP: NEGATIVE for significant cough or SOB  CV: NEGATIVE for chest pain, palpitations or peripheral edema  GI: NEGATIVE for nausea, abdominal pain, heartburn, or change in bowel habits  : NEGATIVE for frequency, dysuria, or hematuria  MUSCULOSKELETAL: NEGATIVE for significant  "arthralgias or myalgia  NEURO: NEGATIVE for weakness, dizziness or paresthesias  ENDOCRINE: NEGATIVE for temperature intolerance, skin/hair changes  HEME: NEGATIVE for bleeding problems  PSYCHIATRIC: NEGATIVE for changes in mood or affect    EXAM:   /66 (BP Location: Right arm, Patient Position: Sitting, Cuff Size: Adult Regular)   Pulse 62   Temp 99.4  F (37.4  C) (Oral)   Resp 16   Ht 1.651 m (5' 5\")   Wt 80 kg (176 lb 6.4 oz)   LMP  (LMP Unknown)   SpO2 95%   BMI 29.35 kg/m      GENERAL APPEARANCE: healthy, alert and no distress     NECK: no adenopathy, no asymmetry, masses, or scars and thyroid normal to palpation     RESP: lungs clear to auscultation - no rales, rhonchi or wheezes     CV: regular rates and rhythm, normal S1 S2, no S3 or S4 and no murmur, click or rub     ABDOMEN:  soft, nontender, no HSM or masses and bowel sounds normal     MS: extremities normal- no gross deformities noted, no evidence of inflammation in joints, FROM in all extremities.     SKIN: no suspicious lesions or rashes     NEURO: Normal strength and tone, sensory exam grossly normal, mentation intact and speech normal     PSYCH: mentation appears normal. and affect normal/bright     LYMPHATICS: No cervical adenopathy    DIAGNOSTICS:   EKG: appears normal, NSR form 4/29/19    Recent Labs   Lab Test 03/06/19  1706 07/03/18  0824 09/21/17  1713   HGB 13.8  --  13.2     --  238    141 140   POTASSIUM 3.9 3.8 3.7   CR 0.96 0.91 0.92        IMPRESSION:   Reason for surgery/procedure: R hip DJD    The proposed surgical procedure is considered INTERMEDIATE risk.    REVISED CARDIAC RISK INDEX  The patient has the following serious cardiovascular risks for perioperative complications such as (MI, PE, VFib and 3  AV Block):  No serious cardiac risks  INTERPRETATION: 0 risks: Class I (very low risk - 0.4% complication rate)    The patient has the following additional risks for perioperative complications:  No " identified additional risks    (Z01.818) Pre-op exam  (primary encounter diagnosis)  Comment:   Plan: Methicillin resistant staph aureus cult, CBC         with platelets, Basic metabolic panel, UA         reflex to Microscopic and Culture              RECOMMENDATIONS:     --Consult hospital rounder / IM to assist post-op medical management    --Patient is to take all scheduled medications on the day of surgery EXCEPT for modifications listed below.    APPROVAL GIVEN to proceed with proposed procedure, without further diagnostic evaluation       Signed Electronically by: Gladys Higuera NP    Copy of this evaluation report is provided to requesting physician.    [facility] Preop Guidelines    Revised Cardiac Risk Index

## 2019-05-30 ENCOUNTER — TELEPHONE (OUTPATIENT)
Dept: INTERNAL MEDICINE | Facility: CLINIC | Age: 54
End: 2019-05-30

## 2019-05-30 DIAGNOSIS — N30.00 ACUTE CYSTITIS WITHOUT HEMATURIA: Primary | ICD-10-CM

## 2019-05-30 LAB
ANION GAP SERPL CALCULATED.3IONS-SCNC: 7 MMOL/L (ref 3–14)
BACTERIA SPEC CULT: NORMAL
BUN SERPL-MCNC: 18 MG/DL (ref 7–30)
CALCIUM SERPL-MCNC: 8.8 MG/DL (ref 8.5–10.1)
CHLORIDE SERPL-SCNC: 107 MMOL/L (ref 94–109)
CO2 SERPL-SCNC: 27 MMOL/L (ref 20–32)
CREAT SERPL-MCNC: 0.9 MG/DL (ref 0.52–1.04)
GFR SERPL CREATININE-BSD FRML MDRD: 74 ML/MIN/{1.73_M2}
GLUCOSE SERPL-MCNC: 88 MG/DL (ref 70–99)
POTASSIUM SERPL-SCNC: 3.7 MMOL/L (ref 3.4–5.3)
SODIUM SERPL-SCNC: 141 MMOL/L (ref 133–144)
SPECIMEN SOURCE: NORMAL

## 2019-05-30 RX ORDER — CIPROFLOXACIN 250 MG/1
250 TABLET, FILM COATED ORAL 2 TIMES DAILY
Qty: 6 TABLET | Refills: 0 | Status: SHIPPED | OUTPATIENT
Start: 2019-05-30 | End: 2019-10-29

## 2019-05-30 NOTE — TELEPHONE ENCOUNTER
Please advise pt UA positive for UTI, eRx sent cipro to be taken prior to surgery  Gladys Higuera CNP

## 2019-06-01 LAB
BACTERIA SPEC CULT: NORMAL
Lab: NORMAL
SPECIMEN SOURCE: NORMAL

## 2019-06-09 DIAGNOSIS — F41.1 GAD (GENERALIZED ANXIETY DISORDER): ICD-10-CM

## 2019-06-10 RX ORDER — ALPRAZOLAM 0.5 MG
TABLET ORAL
Qty: 30 TABLET | Refills: 0 | Status: SHIPPED | OUTPATIENT
Start: 2019-06-10 | End: 2019-07-08

## 2019-06-10 NOTE — TELEPHONE ENCOUNTER
Requested Prescriptions   Pending Prescriptions Disp Refills     ALPRAZolam (XANAX) 0.5 MG tablet [Pharmacy Med Name: ALPRAZolam Oral  Last Written Prescription Date:  5/6/2019  Last Fill Quantity: 30,  # refills: 0   Last office visit: 5/29/2019 with prescribing provider:     Future Office Visit:   Tablet 0.5 MG] 30 tablet 0     Sig: TAKE ONE TABLET BY MOUTH DAILY AS NEEDED FOR ANXIETY       There is no refill protocol information for this order

## 2019-06-10 NOTE — TELEPHONE ENCOUNTER
Controlled Substance Refill Request for Xanax  Problem List Complete:  No     PROVIDER TO CONSIDER COMPLETION OF PROBLEM LIST AND OVERVIEW/CONTROLLED SUBSTANCE AGREEMENT    Last Written Prescription Date:  5-6-19  Last Fill Quantity: 30,   # refills: 0    THE MOST RECENT OFFICE VISIT MUST BE WITHIN THE PAST 3 MONTHS. AT LEAST ONE FACE TO FACE VISIT MUST OCCUR EVERY 6 MONTHS. ADDITIONAL VISITS CAN BE VIRTUAL.  (THIS STATEMENT SHOULD BE DELETED.)    Last Office Visit with [facility] primary care provider: 5-29-19    Future Office visit:     Controlled substance agreement:   Encounter-Level CSA - 05/16/2016:    Controlled Substance Agreement - Scan on 5/26/2016 12:16 PM: Controlled Substance Agreement-5/17/2016 (below)       Patient-Level CSA:    There are no patient-level csa.         Last Urine Drug Screen: No results found for: CDAUT, No results found for: COMDAT, No results found for: THC13, PCP13, COC13, MAMP13, OPI13, AMP13, BZO13, TCA13, MTD13, BAR13, OXY13, PPX13, BUP13     Processing:  Fax Rx to [facility] pharmacy     [email or web address]       checked in past 3 months?  Access not granted by provider.    Please advise, thanks.

## 2019-06-11 ENCOUNTER — MYC REFILL (OUTPATIENT)
Dept: INTERNAL MEDICINE | Facility: CLINIC | Age: 54
End: 2019-06-11

## 2019-06-11 DIAGNOSIS — F41.1 GAD (GENERALIZED ANXIETY DISORDER): ICD-10-CM

## 2019-06-11 RX ORDER — ALPRAZOLAM 0.5 MG
TABLET ORAL
Qty: 30 TABLET | Refills: 0 | Status: CANCELLED | OUTPATIENT
Start: 2019-06-11

## 2019-06-12 DIAGNOSIS — G62.9 NEUROPATHY: ICD-10-CM

## 2019-06-12 RX ORDER — GABAPENTIN 600 MG/1
TABLET ORAL
Qty: 270 TABLET | Refills: 1 | Status: SHIPPED | OUTPATIENT
Start: 2019-06-12 | End: 2020-02-25

## 2019-06-19 ENCOUNTER — TRANSFERRED RECORDS (OUTPATIENT)
Dept: HEALTH INFORMATION MANAGEMENT | Facility: CLINIC | Age: 54
End: 2019-06-19

## 2019-06-24 DIAGNOSIS — F98.8 ATTENTION DEFICIT DISORDER, UNSPECIFIED HYPERACTIVITY PRESENCE: ICD-10-CM

## 2019-06-24 RX ORDER — METHYLPHENIDATE HYDROCHLORIDE 20 MG/1
20 TABLET ORAL 2 TIMES DAILY
Qty: 60 TABLET | Refills: 0 | Status: SHIPPED | OUTPATIENT
Start: 2019-06-24 | End: 2019-07-23

## 2019-06-24 NOTE — TELEPHONE ENCOUNTER
RITALIN      Last Written Prescription Date:  05/26/19  Last Fill Quantity: 60,   # refills: 0  Last Office Visit: 05/29/19  Future Office visit:       Routing refill request to provider for review/approval because:  Drug not on the [facility], P or [facility] refill protocol or controlled substance

## 2019-07-08 DIAGNOSIS — F41.1 GAD (GENERALIZED ANXIETY DISORDER): ICD-10-CM

## 2019-07-09 RX ORDER — ALPRAZOLAM 0.5 MG
TABLET ORAL
Qty: 30 TABLET | Refills: 0 | Status: SHIPPED | OUTPATIENT
Start: 2019-07-09 | End: 2019-08-11

## 2019-07-09 NOTE — TELEPHONE ENCOUNTER
Xanax         Last Written Prescription Date:  6/10/19  Last Fill Quantity: 30,   # refills: 0    Last Office Visit: 5/29/19    Future Office visit:       Routing refill request to provider for review/approval because:  Drug not on the [facility], P or [facility] refill protocol or controlled substance

## 2019-07-09 NOTE — TELEPHONE ENCOUNTER
Requested Prescriptions   Pending Prescriptions Disp Refills     ALPRAZolam (XANAX) 0.5 MG tablet [Pharmacy Med Name: ALPRAZolam Oral  Last Written Prescription Date:  6/10/2019  Last Fill Quantity: 30,  # refills: 0   Last office visit: 5/29/2019 with prescribing provider:     Future Office Visit:   Tablet 0.5 MG] 30 tablet 0     Sig: TAKE ONE TABLET BY MOUTH EVERY DAY AS NEEDED FOR ANXIETY       There is no refill protocol information for this order

## 2019-07-19 ENCOUNTER — TRANSFERRED RECORDS (OUTPATIENT)
Dept: HEALTH INFORMATION MANAGEMENT | Facility: CLINIC | Age: 54
End: 2019-07-19

## 2019-07-19 ENCOUNTER — TELEPHONE (OUTPATIENT)
Dept: INTERNAL MEDICINE | Facility: CLINIC | Age: 54
End: 2019-07-19

## 2019-07-19 ENCOUNTER — NURSE TRIAGE (OUTPATIENT)
Dept: NURSING | Facility: CLINIC | Age: 54
End: 2019-07-19

## 2019-07-19 DIAGNOSIS — F98.8 ATTENTION DEFICIT DISORDER, UNSPECIFIED HYPERACTIVITY PRESENCE: ICD-10-CM

## 2019-07-19 RX ORDER — METHYLPHENIDATE HYDROCHLORIDE 20 MG/1
20 TABLET ORAL 2 TIMES DAILY
Qty: 60 TABLET | Refills: 0 | Status: CANCELLED | OUTPATIENT
Start: 2019-07-19

## 2019-07-19 NOTE — TELEPHONE ENCOUNTER
Clinic Action Needed: Please call back pt when hard copy Rx of Ritalin  sent [facility] pharmacy for refill.   Reason for Call: Pt calling for refill of Ritalin Rx .  Routed to provider's pool .  Please do not route back to FNA but  Close Epic  encounter when you are  finished . Caller verbalizes understanding and denies further questions and will call back if  triage requested or other problems not responded to in a timely way .   Jessenia Piña RN [place] [facility] .

## 2019-07-19 NOTE — TELEPHONE ENCOUNTER
This prescription is not due to be filled until July 24.  It was filled on June 24 for 60 which is supposed to last 30 days.  With the controlled substance agreement, the medication cannot be filled early for any reason whatsoever.  It will be filled on July 24.  We request only 3 business day notice if the prescription is to go to the [place] pharmacy, 7 days if it is supposed to be mailed to her pharmacy.

## 2019-07-19 NOTE — TELEPHONE ENCOUNTER
Controlled Substance Refill Request for Ritalin  Problem List Complete:  No     PROVIDER TO CONSIDER COMPLETION OF PROBLEM LIST AND OVERVIEW/CONTROLLED SUBSTANCE AGREEMENT    Last Written Prescription Date:  6-24-19  Last Fill Quantity: 60,   # refills: 0    THE MOST RECENT OFFICE VISIT MUST BE WITHIN THE PAST 3 MONTHS. AT LEAST ONE FACE TO FACE VISIT MUST OCCUR EVERY 6 MONTHS. ADDITIONAL VISITS CAN BE VIRTUAL.  (THIS STATEMENT SHOULD BE DELETED.)    [place] [facility] [place]: 3-6-19    Future Office visit:     Controlled substance agreement:   Encounter-Level CSA - 05/16/2016:    Controlled Substance Agreement - Scan on 5/26/2016 12:16 PM: Controlled Substance Agreement-5/17/2016 (below)       Patient-Level CSA:    There are no patient-level csa.         Last Urine Drug Screen: No results found for: CDAUT, No results found for: COMDAT, No results found for: THC13, PCP13, COC13, MAMP13, OPI13, AMP13, BZO13, TCA13, MTD13, BAR13, OXY13, PPX13, BUP13     Processing:  Staff will hand deliver Rx to on-site pharmacy     [email or web address]       checked in past 3 months?  No d/t access not granted by primary care provider.    Please advise, thanks.  (Routing to covering provider.)

## 2019-07-19 NOTE — TELEPHONE ENCOUNTER
Patient calling back stating she was told to request medication 4 days ahead of time.  Patient just wants it to be ready by the time she can pick it up.  Informed patient that we need only 3 days notice.  Patient verbalized understanding.     Please advise, thanks.  Are you able to pre date prescription for the 24th, so patient doesn't have to call again on Monday for prescription? Patient aware she can't pick it up until the 24th

## 2019-07-23 ENCOUNTER — MYC REFILL (OUTPATIENT)
Dept: INTERNAL MEDICINE | Facility: CLINIC | Age: 54
End: 2019-07-23

## 2019-07-23 ENCOUNTER — TELEPHONE (OUTPATIENT)
Dept: INTERNAL MEDICINE | Facility: CLINIC | Age: 54
End: 2019-07-23

## 2019-07-23 ENCOUNTER — NURSE TRIAGE (OUTPATIENT)
Dept: NURSING | Facility: CLINIC | Age: 54
End: 2019-07-23

## 2019-07-23 DIAGNOSIS — F98.8 ATTENTION DEFICIT DISORDER, UNSPECIFIED HYPERACTIVITY PRESENCE: ICD-10-CM

## 2019-07-23 RX ORDER — METHYLPHENIDATE HYDROCHLORIDE 20 MG/1
20 TABLET ORAL 2 TIMES DAILY
Qty: 60 TABLET | Refills: 0 | Status: SHIPPED | OUTPATIENT
Start: 2019-07-23 | End: 2019-08-20

## 2019-07-23 RX ORDER — METHYLPHENIDATE HYDROCHLORIDE 20 MG/1
20 TABLET ORAL 2 TIMES DAILY
Qty: 60 TABLET | Refills: 0 | Status: CANCELLED | OUTPATIENT
Start: 2019-07-23

## 2019-07-23 NOTE — TELEPHONE ENCOUNTER
Patient calls, flight leaving at 3pm today, needs to  prescription this morning. This RN will walk prescription to [facility] now for patient as morning  isn't until 10 am.

## 2019-07-23 NOTE — TELEPHONE ENCOUNTER
Patient calling requesting refill of Ritalin. Reviewed with patient Ritalin refill has been completed.   Patient is requesting that it be walked down to pharmacy.  Attempted to reach [facility] directly to request prescription be walked down to pharmacy.  Patient hung up while waiting on line.    Please walk Ritalin prescription to pharmacy as soon as possible patient is leaving out of town.    Caller verbalized understanding. Denies further questions.     Marilou Madrid RN  [place] [facility]

## 2019-07-23 NOTE — TELEPHONE ENCOUNTER
Patient calling requesting refill of Ritalin. Reviewed with patient Ritalin refill has been completed.   Patient is requesting that it be walked down to pharmacy.  Attempted to reach [facility] directly to request prescription be walked down to pharmacy.  Patient hung up while waiting on line.    Please walk Ritalin prescription to pharmacy as soon as possible patient is leaving out of town.    Caller verbalized understanding. Denies further questions.     Marilou Madrid RN  [place] [facility]

## 2019-08-11 DIAGNOSIS — F41.1 GAD (GENERALIZED ANXIETY DISORDER): ICD-10-CM

## 2019-08-12 ENCOUNTER — MYC REFILL (OUTPATIENT)
Dept: INTERNAL MEDICINE | Facility: CLINIC | Age: 54
End: 2019-08-12

## 2019-08-12 DIAGNOSIS — F41.1 GAD (GENERALIZED ANXIETY DISORDER): ICD-10-CM

## 2019-08-12 NOTE — TELEPHONE ENCOUNTER
Requested Prescriptions   Pending Prescriptions Disp Refills     ALPRAZolam (XANAX) 0.5 MG tablet [Pharmacy Med Name: ALPRAZolam Oral  Last Written Prescription Date:  7/9/2019  Last Fill Quantity: 30,  # refills: 0   Last office visit: 5/29/2019 with prescribing provider:     Future Office Visit:   Tablet 0.5 MG] 30 tablet 0     Sig: TAKE ONE TABLET BY MOUTH DAILY AS NEEDED FOR ANXIETY       There is no refill protocol information for this order

## 2019-08-13 RX ORDER — ALPRAZOLAM 0.5 MG
TABLET ORAL
Qty: 30 TABLET | Refills: 0 | Status: SHIPPED | OUTPATIENT
Start: 2019-08-13 | End: 2019-09-05

## 2019-08-14 RX ORDER — ALPRAZOLAM 0.5 MG
TABLET ORAL
Qty: 30 TABLET | Refills: 0 | OUTPATIENT
Start: 2019-08-14

## 2019-08-19 DIAGNOSIS — F98.8 ATTENTION DEFICIT DISORDER, UNSPECIFIED HYPERACTIVITY PRESENCE: ICD-10-CM

## 2019-08-20 RX ORDER — METHYLPHENIDATE HYDROCHLORIDE 20 MG/1
20 TABLET ORAL 2 TIMES DAILY
Qty: 60 TABLET | Refills: 0 | Status: SHIPPED | OUTPATIENT
Start: 2019-08-20 | End: 2019-09-17

## 2019-08-21 DIAGNOSIS — I10 ESSENTIAL HYPERTENSION: ICD-10-CM

## 2019-08-22 RX ORDER — LISINOPRIL 20 MG/1
TABLET ORAL
Qty: 90 TABLET | Refills: 2 | Status: SHIPPED | OUTPATIENT
Start: 2019-08-22 | End: 2020-03-16

## 2019-08-22 NOTE — TELEPHONE ENCOUNTER
"LOV 5/25/19.  Prescription approved per [facility] Refill Protocol. MISBAH Flor R.N.    Requested Prescriptions   Pending Prescriptions Disp Refills     lisinopril (PRINIVIL/ZESTRIL) 20 MG tablet [Pharmacy Med Name: Lisinopril Oral Tablet 20 MG] 90 tablet 2     Sig: TAKE ONE TABLET BY MOUTH EVERY DAY       ACE Inhibitors (Including Combos) Protocol Passed - 8/21/2019  7:02 PM        Passed - Blood pressure under 140/90 in past 12 months     BP Readings from Last 3 Encounters:   05/29/19 122/66   04/29/19 120/78   03/06/19 132/84                 Passed - Recent (12 mo) or future (30 days) visit within the authorizing provider's specialty     Patient had office visit in the last 12 months or has a visit in the next 30 days with authorizing provider or within the authorizing provider's specialty.  See \"Patient Info\" tab in inbasket, or \"Choose Columns\" in Meds & Orders section of the refill encounter.              Passed - Medication is active on med list        Passed - Patient is age 18 or older        Passed - No active pregnancy on record        Passed - Normal serum creatinine on file in past 12 months     Recent Labs   Lab Test 05/29/19  1620   CR 0.90             Passed - Normal serum potassium on file in past 12 months     Recent Labs   Lab Test 05/29/19  1620   POTASSIUM 3.7             Passed - No positive pregnancy test within past 12 months          "

## 2019-08-26 ENCOUNTER — TELEPHONE (OUTPATIENT)
Dept: INTERNAL MEDICINE | Facility: CLINIC | Age: 54
End: 2019-08-26

## 2019-08-26 DIAGNOSIS — Z11.9 SCREENING EXAMINATION FOR INFECTIOUS DISEASE: Primary | ICD-10-CM

## 2019-08-26 NOTE — TELEPHONE ENCOUNTER
Pt calling requesting titers for measles, mumps and rubella. She stated she needs to prove immunity for school. Pt can be reached at [phone]. Please advise. Thanks.

## 2019-09-05 DIAGNOSIS — F41.1 GAD (GENERALIZED ANXIETY DISORDER): ICD-10-CM

## 2019-09-06 RX ORDER — ALPRAZOLAM 0.5 MG
TABLET ORAL
Qty: 30 TABLET | Refills: 0 | Status: SHIPPED | OUTPATIENT
Start: 2019-09-06 | End: 2019-10-01

## 2019-09-06 NOTE — TELEPHONE ENCOUNTER
Requested Prescriptions   Pending Prescriptions Disp Refills     ALPRAZolam (XANAX) 0.5 MG tablet [Pharmacy Med Name: ALPRAZolam Oral Tablet 0.5 MG] 30 tablet 0     Sig: TAKE ONE TABLET BY MOUTH DAILY AS NEEDED FOR ANXIETY       There is no refill protocol information for this order      Last Written Prescription Date:  08/13/2019  Last Fill Quantity: 30,  # refills: 0   Last office visit: 5/29/2019 with prescribing provider:     Future Office Visit:

## 2019-09-06 NOTE — TELEPHONE ENCOUNTER
Routing refill request to provider for review/approval because:  Drug not on the [facility] refill protocol     Last refill 8/13/19

## 2019-09-16 DIAGNOSIS — F98.8 ATTENTION DEFICIT DISORDER, UNSPECIFIED HYPERACTIVITY PRESENCE: ICD-10-CM

## 2019-09-16 NOTE — TELEPHONE ENCOUNTER
Controlled Substance Refill Request for ritalin  Problem List Complete:  No     PROVIDER TO CONSIDER COMPLETION OF PROBLEM LIST AND OVERVIEW/CONTROLLED SUBSTANCE AGREEMENT    Last Written Prescription Date:  8/20/19  Last Fill Quantity: 60,   # refills: 0      [place] [facility] [place]: 5/29/19 [place]    Future Office visit:     Controlled substance agreement:   Encounter-Level CSA - 05/16/2016:    Controlled Substance Agreement - Scan on 5/26/2016 12:16 PM: Controlled Substance Agreement-5/17/2016 (below)       Patient-Level CSA:    There are no patient-level csa.         Last Urine Drug Screen: No results found for: CDAUT, No results found for: COMDAT, No results found for: THC13, PCP13, COC13, MAMP13, OPI13, AMP13, BZO13, TCA13, MTD13, BAR13, OXY13, PPX13, BUP13     Processing:  e scribe     [email or web address]       checked in past 3 months?  No, route to RN

## 2019-09-17 RX ORDER — METHYLPHENIDATE HYDROCHLORIDE 20 MG/1
20 TABLET ORAL 2 TIMES DAILY
Qty: 60 TABLET | Refills: 0 | Status: SHIPPED | OUTPATIENT
Start: 2019-09-17 | End: 2019-10-16

## 2019-09-17 NOTE — TELEPHONE ENCOUNTER
Routing refill request to provider for review/approval because:  Drug not on the [facility] refill protocol   Writer is not authorized to check  for provider

## 2019-10-01 DIAGNOSIS — F41.1 GAD (GENERALIZED ANXIETY DISORDER): ICD-10-CM

## 2019-10-02 NOTE — TELEPHONE ENCOUNTER
Requested Prescriptions   Pending Prescriptions Disp Refills     ALPRAZolam (XANAX) 0.5 MG tablet [Pharmacy Med Name: ALPRAZolam Oral Tablet 0.5 MG] 30 tablet 0     Sig: TAKE ONE TABLET BY MOUTH DAILY AS NEEDED FOR ANXIETY       There is no refill protocol information for this order      Last Written Prescription Date:  09/06/2019  Last Fill Quantity: 30,  # refills: 0   Last office visit: 5/29/2019 with prescribing provider:     Future Office Visit:

## 2019-10-03 NOTE — TELEPHONE ENCOUNTER
Controlled Substance Refill Request for xanax  Problem List Complete:  No     PROVIDER TO CONSIDER COMPLETION OF PROBLEM LIST AND OVERVIEW/CONTROLLED SUBSTANCE AGREEMENT    Last Written Prescription Date:  9/6/19  Last Fill Quantity: 30,   # refills: 0    THE MOST RECENT OFFICE VISIT MUST BE WITHIN THE PAST 3 MONTHS. AT LEAST ONE FACE TO FACE VISIT MUST OCCUR EVERY 6 MONTHS. ADDITIONAL VISITS CAN BE VIRTUAL.  (THIS STATEMENT SHOULD BE DELETED.)    Last Office Visit with [facility] primary care provider: 3/6/19    Future Office visit:     Controlled substance agreement:   Encounter-Level CSA - 05/16/2016:    Controlled Substance Agreement - Scan on 5/26/2016 12:16 PM: Controlled Substance Agreement-5/17/2016     Patient-Level CSA:    There are no patient-level csa.         Last Urine Drug Screen: No results found for: CDAUT, No results found for: COMDAT, No results found for: THC13, PCP13, COC13, MAMP13, OPI13, AMP13, BZO13, TCA13, MTD13, BAR13, OXY13, PPX13, BUP13     Processing:  as selected     [email or web address]    Writer unable to check -not authorized by primary care provider.

## 2019-10-04 RX ORDER — ALPRAZOLAM 0.5 MG
TABLET ORAL
Qty: 30 TABLET | Refills: 0 | Status: SHIPPED | OUTPATIENT
Start: 2019-10-04 | End: 2019-10-29

## 2019-10-14 DIAGNOSIS — F98.8 ATTENTION DEFICIT DISORDER, UNSPECIFIED HYPERACTIVITY PRESENCE: ICD-10-CM

## 2019-10-14 RX ORDER — METHYLPHENIDATE HYDROCHLORIDE 20 MG/1
20 TABLET ORAL 2 TIMES DAILY
Qty: 60 TABLET | Refills: 0 | Status: CANCELLED | OUTPATIENT
Start: 2019-10-14

## 2019-10-14 NOTE — TELEPHONE ENCOUNTER
Requested Prescriptions   Pending Prescriptions Disp Refills     methylphenidate (RITALIN) 20 MG tablet Last Written Prescription Date:  9/17/2019  Last Fill Quantity: 60 tablet,  # refills: 0   Last office visit: 5/29/2019 with prescribing provider:  5/29/2019  Future Office Visit:   60 tablet 0     Sig: Take 1 tablet (20 mg) by mouth 2 times daily       There is no refill protocol information for this order

## 2019-10-16 ENCOUNTER — TELEPHONE (OUTPATIENT)
Dept: INTERNAL MEDICINE | Facility: CLINIC | Age: 54
End: 2019-10-16

## 2019-10-16 DIAGNOSIS — F98.8 ATTENTION DEFICIT DISORDER, UNSPECIFIED HYPERACTIVITY PRESENCE: ICD-10-CM

## 2019-10-16 RX ORDER — METHYLPHENIDATE HYDROCHLORIDE 20 MG/1
20 TABLET ORAL 2 TIMES DAILY
Qty: 60 TABLET | Refills: 0 | Status: SHIPPED | OUTPATIENT
Start: 2019-10-16 | End: 2019-11-15

## 2019-10-16 NOTE — TELEPHONE ENCOUNTER
Patient waiting for RX she needs to day and requesting that I send his triage nurse a message. Ok to call and mak [phone]

## 2019-10-16 NOTE — TELEPHONE ENCOUNTER
Patient called again to check on medication. She needs it by 4:00 today as she is leaving town. Please call her when Rx sent to pharmacy.

## 2019-10-16 NOTE — TELEPHONE ENCOUNTER
Pt calls again for her Ritalin refill.     Provider approval needed.     Last refill on 9/17/19 for #60     Last OV 5/29/19    Routing refill request to provider for review/approval because:  Drug not on the [facility] refill protocol     CSA signed.

## 2019-10-16 NOTE — TELEPHONE ENCOUNTER
Controlled Substance Refill Request for methylphenidate (RITALIN) 20 MG tablet  Problem List Complete:  No     PROVIDER TO CONSIDER COMPLETION OF PROBLEM LIST AND OVERVIEW/CONTROLLED SUBSTANCE AGREEMENT    Last Written Prescription Date:  9/17/19  Last Fill Quantity: 60,   # refills: 0    Last Office Visit with [facility] primary care provider: 3/6/19 Emanuel    Future Office visit:     Controlled substance agreement:   Encounter-Level CSA - 05/16/2016:    Controlled Substance Agreement - Scan on 5/26/2016 12:16 PM: Controlled Substance Agreement-5/17/2016     Patient-Level CSA:    There are no patient-level csa.         Last Urine Drug Screen: No results found for: CDAUT, No results found for: COMDAT, No results found for: THC13, PCP13, COC13, MAMP13, OPI13, AMP13, BZO13, TCA13, MTD13, BAR13, OXY13, PPX13, BUP13     Processing:  Rx to be electronically transmitted to pharmacy by provider      [email or web address]       checked in past 3 months?  No, access not granted by provider.

## 2019-10-16 NOTE — TELEPHONE ENCOUNTER
Patient calling again. Needs Rx before she leaves at 4:30 pm. Patient would like someone to walk it to [facility] asap

## 2019-10-29 ENCOUNTER — HOSPITAL ENCOUNTER (EMERGENCY)
Facility: CLINIC | Age: 54
Discharge: HOME | End: 2019-10-29
Attending: EMERGENCY MEDICINE | Admitting: EMERGENCY MEDICINE
Payer: COMMERCIAL

## 2019-10-29 VITALS
SYSTOLIC BLOOD PRESSURE: 147 MMHG | DIASTOLIC BLOOD PRESSURE: 99 MMHG | TEMPERATURE: 97.8 F | RESPIRATION RATE: 18 BRPM | OXYGEN SATURATION: 99 % | HEART RATE: 97 BPM

## 2019-10-29 DIAGNOSIS — H66.002 ACUTE SUPPURATIVE OTITIS MEDIA OF LEFT EAR WITHOUT SPONTANEOUS RUPTURE OF TYMPANIC MEMBRANE, RECURRENCE NOT SPECIFIED: ICD-10-CM

## 2019-10-29 DIAGNOSIS — F41.1 GAD (GENERALIZED ANXIETY DISORDER): ICD-10-CM

## 2019-10-29 PROCEDURE — 99283 EMERGENCY DEPT VISIT LOW MDM: CPT

## 2019-10-29 RX ORDER — AMOXICILLIN 875 MG
875 TABLET ORAL 2 TIMES DAILY
Qty: 20 TABLET | Refills: 0 | Status: SHIPPED | OUTPATIENT
Start: 2019-10-29 | End: 2020-01-06

## 2019-10-29 NOTE — LETTER
October 29, 2019      To Whom It May Concern:      Jayleen Lang was seen in our Emergency Department today, 10/29/19.  I expect her condition to improve over the next 1-2 days.  She may return to work/school when improved.    Sincerely,        Ana Cox RN

## 2019-10-29 NOTE — ED AVS SNAPSHOT
[facility] Emergency Department  [address]  [facility] [address]  Phone:  [phone]  Fax:  [phone]                                    Jayleen Lang   MRN: [id number]    Department:  [facility] Emergency Department   Date of Visit:  10/29/2019           After Visit Summary Signature Page    I have received my discharge instructions, and my questions have been answered. I have discussed any challenges I see with this plan with the nurse or doctor.    ..........................................................................................................................................  Patient/Patient Representative Signature      ..........................................................................................................................................  Patient Representative Print Name and Relationship to Patient    ..................................................               ................................................  Date                                   Time    ..........................................................................................................................................  Reviewed by Signature/Title    ...................................................              ..............................................  Date                                               Time          22EPIC Rev 08/18

## 2019-10-29 NOTE — ED PROVIDER NOTES
"  History     Chief Complaint:  Otalgia    The history is provided by the patient.      Jayleen Lang is a 54 year old female, with the below past medical history, who presents alone for evaluation of ongoing left ear pain and pressure for the last two weeks with associated lightheadedness and dizziness. Patient reports that she is due to go on a field trip later today and feels like she is \"off balance\" and \"doesn't want other people to think [she] is crazy\". Patient denies any fever or sore throat.     Allergies:  Sulfa drugs     Medications:    Xanax  Zyrtec  EpiPen  Flonase   Neurontin  Lisinopril  Ritalin    Past Medical History:    ADD  Generalized anxiety disorder  Hypertension  Hyperlipidemia    Past Surgical History:    Arthroplasty hip - left  Arthroscopic repair posterior cruciate ligament  Bunionectomy - left   x2  Colonoscopy x2  Foot surgery - bone spurs  Laminect/discectomy, cervical  Osteotomy foot    Family History:    Mother - lung cancer  Father - heart disease, congestive heart    Social History:  The patient was accompanied to the ED alone.  Smoking Status: No  Smokeless Tobacco: No  Alcohol Use: Yes  Drug Use: No   Marital Status:       Review of Systems   All other systems reviewed and are negative.      Physical Exam   Vitals:  Patient Vitals for the past 24 hrs:   BP Temp Temp src Pulse Heart Rate Resp SpO2   10/29/19 0519 (!) 147/99 -- -- 97 -- -- 99 %   10/29/19 0457 (!) 157/114 97.8  F (36.6  C) Oral 92 92 18 98 %      Physical Exam  Nursing note and vitals reviewed.  Constitutional: Cooperative.   HENT: Purulent fluid noted behind a bulging left TM. Right TM normal.  EOC's normal.  No mastoid tenderness  Mouth/Throat: Mucous membranes are normal.   Cardiovascular: Normal rate, regular rhythm and normal heart sounds.  No murmur.  Pulmonary/Chest: Effort normal and breath sounds normal. No respiratory distress. No wheezes.   Neurological: Alert.   Skin: Skin is warm and dry. "   Psychiatric: Normal mood and affect.      Emergency Department Course   Emergency Department Course:  Nursing notes and vitals reviewed.    (0554)   I performed an exam of the patient as documented above. History obtained from patient. Discussed physical exam findings with patient and plan of care discussed.    Findings and plan explained to the Patient. Patient discharged home with instructions regarding supportive care, medications, and reasons to return. The importance of close follow-up was reviewed. The patient was prescribed Amoxicillin. I personally answered all related questions prior to discharge.    Impression & Plan      Medical Decision Making:  Jayleen Lang is a 54 year old female who presents today to the ED today for evaluation of left ear pain that has been ongoing for the last two weeks. The patient has an exam consistent with acute otitis media.  The patient's left TM appeared erythematous and bulging.  The expiratory external auditory canal is without erythema or lesions.  There was no discomfort with movement of the tragus or pinna to suggest otitis externa.  No signs of mastoiditis including erythema, edema, pain over the mastoid bone was present.  Additionally, there were no signs to suggest dental abscess, mass, peritonsillar abscess.  The patient will be started on Amoxicillin and may take Tylenol or ibuprofen at home for pain control.  They should return to the ED for increasing pain, high fever, decrease in hearing, or persistent ear discharge.  They should follow-up with her primary care provider in 7-10 days if symptoms persist.  All questions were answered prior the patient's discharge.  They were in agreement with the plan stated above.    Diagnosis:    ICD-10-CM    1. Acute suppurative otitis media of left ear without spontaneous rupture of tympanic membrane, recurrence not specified H66.002         Disposition:   Discharged.    Discharge Medications:     Medication List      Started     amoxicillin 875 MG tablet  Commonly known as:  AMOXIL  875 mg, Oral, 2 TIMES DAILY            Scribe Disclosure:  I, Andreina Metz, am serving as a scribe at 5:11 AM on 10/29/2019 to document services personally performed by Raimundo Ramon MD, based on my observations and the provider's statements to me.  10/29/2019   [facility] EMERGENCY DEPARTMENT       Raimundo Ramon MD  10/29/19 0610

## 2019-10-30 ENCOUNTER — TELEPHONE (OUTPATIENT)
Dept: NURSING | Facility: CLINIC | Age: 54
End: 2019-10-30

## 2019-10-30 RX ORDER — ALPRAZOLAM 0.5 MG
TABLET ORAL
Qty: 30 TABLET | Refills: 0 | Status: SHIPPED | OUTPATIENT
Start: 2019-10-30 | End: 2019-11-24

## 2019-10-30 NOTE — TELEPHONE ENCOUNTER
Xanax refill request.     Last OV 5/29/19    Last refill on 10/4/19 for #30 with 0 refills.       Routing refill request to provider for review/approval because:  Drug not on the [facility], [facility] or [facility] refill protocol or controlled substance

## 2019-11-13 ENCOUNTER — NURSE TRIAGE (OUTPATIENT)
Dept: NURSING | Facility: CLINIC | Age: 54
End: 2019-11-13

## 2019-11-13 DIAGNOSIS — F98.8 ATTENTION DEFICIT DISORDER, UNSPECIFIED HYPERACTIVITY PRESENCE: ICD-10-CM

## 2019-11-14 NOTE — TELEPHONE ENCOUNTER
Clinic Action Needed: Refill request    FNA Triage Call  Presenting Problem:  Patient calling to request a refill of methylphenidate (RITALIN) 20 MG tablet.     Preferred pharmacy- [facility] in [place]    Routed to: [place] RN Pool    Please be sure to close this encounter once this patient's issue/question has been addressed.    Kaye Turner RN/[facility]

## 2019-11-14 NOTE — TELEPHONE ENCOUNTER
Patient calling to request a refill of methylphenidate (RITALIN) 20 MG tablet.     Preferred pharmacy- [facility] in [place]    Will send a message to [facility].     Kaye Turner RN/[facility]    Reason for Disposition    Caller requesting a NON-URGENT new prescription or refill and triager unable to refill per unit policy    Protocols used: MEDICATION QUESTION CALL-A-

## 2019-11-14 NOTE — TELEPHONE ENCOUNTER
Ritalin      Last Written Prescription Date:  10/16/19  Last Fill Quantity: 60,   # refills: 0  Last Office Visit: 5/29/19  Future Office visit:       Routing refill request to provider for review/approval because:  Drug not on the [facility], P or [facility] refill protocol or controlled substance  Writer is not authorized to check  for provider

## 2019-11-15 RX ORDER — METHYLPHENIDATE HYDROCHLORIDE 20 MG/1
20 TABLET ORAL 2 TIMES DAILY
Qty: 60 TABLET | Refills: 0 | Status: SHIPPED | OUTPATIENT
Start: 2019-11-15 | End: 2019-12-10

## 2019-11-24 DIAGNOSIS — F41.1 GAD (GENERALIZED ANXIETY DISORDER): ICD-10-CM

## 2019-11-25 NOTE — TELEPHONE ENCOUNTER
Requested Prescriptions   Pending Prescriptions Disp Refills     ALPRAZolam (XANAX) 0.5 MG tablet [Pharmacy Med Name: ALPRAZolam Oral Tablet 0.5 MG] 30 tablet 0     Sig: TAKE ONE TABLET BY MOUTH DAILY AS NEEDED FOR ANXIETY       There is no refill protocol information for this order      Last Written Prescription Date:  10/30/2019  Last Fill Quantity: 30,  # refills: 0   Last office visit: 5/29/2019 with prescribing provider:     Future Office Visit:

## 2019-11-26 RX ORDER — ALPRAZOLAM 0.5 MG
TABLET ORAL
Qty: 30 TABLET | Refills: 0 | Status: SHIPPED | OUTPATIENT
Start: 2019-11-26 | End: 2019-12-26

## 2019-11-26 NOTE — TELEPHONE ENCOUNTER
RX [place] not reviewed: due to access not granted by provider.    [place]:  [email or web address]    Please advise, thanks.

## 2019-12-09 DIAGNOSIS — F98.8 ATTENTION DEFICIT DISORDER, UNSPECIFIED HYPERACTIVITY PRESENCE: ICD-10-CM

## 2019-12-10 ENCOUNTER — TRANSFERRED RECORDS (OUTPATIENT)
Dept: HEALTH INFORMATION MANAGEMENT | Facility: CLINIC | Age: 54
End: 2019-12-10

## 2019-12-10 RX ORDER — METHYLPHENIDATE HYDROCHLORIDE 20 MG/1
20 TABLET ORAL 2 TIMES DAILY
Qty: 60 TABLET | Refills: 0 | Status: SHIPPED | OUTPATIENT
Start: 2019-12-10 | End: 2020-01-08

## 2019-12-10 NOTE — TELEPHONE ENCOUNTER
"Clinic Action Needed:YES  Reason for Call:\"I need a refill on   methylphenidate (RITALIN) 20 MG tablet 60 tablet 0 11/15/2019  No   Sig - Route: Take 1 tablet (20 mg) by mouth 2 times daily - Oral     Patient Recommendations/Teaching:Will route to PCP  Routed to:PCP  Tameka Daugherty RN [place] [facility]          "

## 2019-12-10 NOTE — TELEPHONE ENCOUNTER
Ritalin refill request.     Last OV on 5/29/19      Last refill on 11/15/19 for #60    Routing refill request to provider for review/approval because:  Drug not on the [facility] refill protocol

## 2019-12-16 ENCOUNTER — TRANSFERRED RECORDS (OUTPATIENT)
Dept: HEALTH INFORMATION MANAGEMENT | Facility: CLINIC | Age: 54
End: 2019-12-16

## 2019-12-17 DIAGNOSIS — F41.1 GAD (GENERALIZED ANXIETY DISORDER): ICD-10-CM

## 2019-12-18 NOTE — TELEPHONE ENCOUNTER
Alprazolam refill request.     Last OV on 5/29/19    Last Refill on 11/26/19 for #30     Pt should have over a week left on prescription. Confirmed with pharmacy she did  on 11/26.     Attempted to contact pt. Left message to call clinic.  Need to see why she is asking for this early. Is she out now, or other reason?     Dr Castellanos can send this to another pharmacy too if going out of town.      CSA signed 5/2016.

## 2019-12-18 NOTE — TELEPHONE ENCOUNTER
Requested Prescriptions   Pending Prescriptions Disp Refills     ALPRAZolam (XANAX) 0.5 MG tablet [Pharmacy Med Name: ALPRAZolam Oral Tablet 0.5 MG] 30 tablet 0     Sig: TAKE ONE TABLET BY MOUTH DAILY AS NEEDED FOR ANXIETY       There is no refill protocol information for this order      Last Written Prescription Date:  11/26/2019  Last Fill Quantity: 30,  # refills: 0   Last office visit: 5/29/2019 with prescribing provider:     Future Office Visit:

## 2019-12-25 ENCOUNTER — NURSE TRIAGE (OUTPATIENT)
Dept: NURSING | Facility: CLINIC | Age: 54
End: 2019-12-25

## 2019-12-25 ENCOUNTER — TELEPHONE (OUTPATIENT)
Dept: INTERNAL MEDICINE | Facility: CLINIC | Age: 54
End: 2019-12-25

## 2019-12-26 RX ORDER — ALPRAZOLAM 0.5 MG
TABLET ORAL
Qty: 30 TABLET | Refills: 0 | Status: SHIPPED | OUTPATIENT
Start: 2019-12-26 | End: 2020-01-27

## 2019-12-26 NOTE — TELEPHONE ENCOUNTER
Pt is calling again for refill. She called [facility] on 12/25/19. No explanation given as to why she needed it early.     Provider approval needed. This is due now.     Last refill on 11/26/19 for #30.

## 2019-12-26 NOTE — TELEPHONE ENCOUNTER
"Patient is asking for a refill of alprazolam.  [facility]NA advised will route request to Dr. Castellanos.  Caller verbalizes understanding.    Reason for Disposition    Caller requesting a NON-URGENT new prescription or refill and triager unable to refill per unit policy    Additional Information    Negative: Drug overdose and nurse unable to answer question    Negative: Caller requesting information not related to medicine    Negative: Caller requesting a prescription for Strep throat and has a positive culture result    Negative: Rash while taking a medication or within 3 days of stopping it    Negative: Immunization reaction suspected    Negative: [1] Asthma and [2] having symptoms of asthma (cough, wheezing, etc)    Negative: MORE THAN A DOUBLE DOSE of a prescription or over-the-counter (OTC) drug    Negative: [1] DOUBLE DOSE (an extra dose or lesser amount) of over-the-counter (OTC) drug AND [2] any symptoms (e.g., dizziness, nausea, pain, sleepiness)    Negative: [1] DOUBLE DOSE (an extra dose or lesser amount) of prescription drug AND [2] any symptoms (e.g., dizziness, nausea, pain, sleepiness)    Negative: Took another person's prescription drug    Negative: [1] DOUBLE DOSE (an extra dose or lesser amount) of prescription drug AND [2] NO symptoms (Exception: a double dose of antibiotics)    Negative: Diabetes drug error or overdose (e.g., insulin or extra dose)    Negative: [1] Request for URGENT new prescription or refill of \"essential\" medication (i.e., likelihood of harm to patient if not taken) AND [2] triager unable to fill per unit policy    Negative: [1] Prescription not at pharmacy AND [2] was prescribed today by PCP    Negative: Pharmacy calling with prescription questions and triager unable to answer question    Negative: Caller has URGENT medication question about med that PCP prescribed and triager unable to answer question    Negative: Caller has NON-URGENT medication question about med that PCP prescribed " and triager unable to answer question    Protocols used: MEDICATION QUESTION CALL-A-AH

## 2019-12-26 NOTE — TELEPHONE ENCOUNTER
Clinic Action Needed: yes    [facility] Triage Call  Presenting Problem:  Patient is requesting a refill of alprazolam.    Routed to: KVNG Sanders RN/[facility]

## 2020-01-06 ENCOUNTER — OFFICE VISIT (OUTPATIENT)
Dept: INTERNAL MEDICINE | Facility: CLINIC | Age: 55
End: 2020-01-06
Payer: COMMERCIAL

## 2020-01-06 VITALS
SYSTOLIC BLOOD PRESSURE: 114 MMHG | HEART RATE: 104 BPM | BODY MASS INDEX: 24.91 KG/M2 | WEIGHT: 149.5 LBS | HEIGHT: 65 IN | DIASTOLIC BLOOD PRESSURE: 70 MMHG | OXYGEN SATURATION: 100 % | RESPIRATION RATE: 20 BRPM | TEMPERATURE: 101.1 F

## 2020-01-06 DIAGNOSIS — J02.9 SORE THROAT: ICD-10-CM

## 2020-01-06 DIAGNOSIS — R50.9 FEVER, UNSPECIFIED FEVER CAUSE: Primary | ICD-10-CM

## 2020-01-06 LAB
DEPRECATED S PYO AG THROAT QL EIA: NORMAL
FLUAV+FLUBV AG SPEC QL: NEGATIVE
FLUAV+FLUBV AG SPEC QL: NEGATIVE
SPECIMEN SOURCE: NORMAL
SPECIMEN SOURCE: NORMAL

## 2020-01-06 PROCEDURE — 99214 OFFICE O/P EST MOD 30 MIN: CPT | Performed by: INTERNAL MEDICINE

## 2020-01-06 PROCEDURE — 87804 INFLUENZA ASSAY W/OPTIC: CPT | Performed by: INTERNAL MEDICINE

## 2020-01-06 PROCEDURE — 87081 CULTURE SCREEN ONLY: CPT | Performed by: INTERNAL MEDICINE

## 2020-01-06 PROCEDURE — 87880 STREP A ASSAY W/OPTIC: CPT | Performed by: INTERNAL MEDICINE

## 2020-01-06 RX ORDER — AZITHROMYCIN 250 MG/1
TABLET, FILM COATED ORAL
Qty: 6 TABLET | Refills: 0 | Status: SHIPPED | OUTPATIENT
Start: 2020-01-06 | End: 2020-02-17

## 2020-01-06 RX ORDER — OSELTAMIVIR PHOSPHATE 75 MG/1
75 CAPSULE ORAL 2 TIMES DAILY
Qty: 10 CAPSULE | Refills: 0 | Status: SHIPPED | OUTPATIENT
Start: 2020-01-06 | End: 2020-02-17

## 2020-01-06 ASSESSMENT — ANXIETY QUESTIONNAIRES
IF YOU CHECKED OFF ANY PROBLEMS ON THIS QUESTIONNAIRE, HOW DIFFICULT HAVE THESE PROBLEMS MADE IT FOR YOU TO DO YOUR WORK, TAKE CARE OF THINGS AT HOME, OR GET ALONG WITH OTHER PEOPLE: SOMEWHAT DIFFICULT
7. FEELING AFRAID AS IF SOMETHING AWFUL MIGHT HAPPEN: NOT AT ALL
2. NOT BEING ABLE TO STOP OR CONTROL WORRYING: SEVERAL DAYS
3. WORRYING TOO MUCH ABOUT DIFFERENT THINGS: SEVERAL DAYS
GAD7 TOTAL SCORE: 7
6. BECOMING EASILY ANNOYED OR IRRITABLE: SEVERAL DAYS
5. BEING SO RESTLESS THAT IT IS HARD TO SIT STILL: SEVERAL DAYS
1. FEELING NERVOUS, ANXIOUS, OR ON EDGE: MORE THAN HALF THE DAYS

## 2020-01-06 ASSESSMENT — MIFFLIN-ST. JEOR: SCORE: 1289.01

## 2020-01-06 ASSESSMENT — PATIENT HEALTH QUESTIONNAIRE - PHQ9
5. POOR APPETITE OR OVEREATING: SEVERAL DAYS
SUM OF ALL RESPONSES TO PHQ QUESTIONS 1-9: 7

## 2020-01-06 NOTE — PROGRESS NOTES
"Subjective     Jayleen Lang is a 54 year old female who presents to clinic today for the following health issues:    HPI     SOB for 24 hours. HA for three days (took 1 Wellbutrin 3 days ago). Fever noted at todays visit.     She has body aches, sweating. No wheezing only mild cough and some sore throat.No nausea or vomiting. She works as a teacher. 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 Has been off work 2 weeks for the holidays and does not think she has been around anyone ill.     BP Readings from Last 3 Encounters:   01/06/20 114/70   10/29/19 (!) 147/99   05/29/19 122/66    Wt Readings from Last 3 Encounters:   01/06/20 67.8 kg (149 lb 8 oz)   05/29/19 80 kg (176 lb 6.4 oz)   04/29/19 78.8 kg (173 lb 12.8 oz)                  Reviewed and updated as needed this visit by Provider         Review of Systems   ROS COMP: Constitutional, HEENT, cardiovascular, pulmonary, GI, , musculoskeletal, neuro, skin, endocrine and psych systems are negative, except as otherwise noted.      Objective    /70   Pulse 104   Temp 101.1  F (38.4  C) (Oral)   Resp 20   Ht 5' 5\" (1.651 m)   Wt 149 lb 8 oz (67.8 kg)   LMP  (LMP Unknown)   SpO2 100%   BMI 24.88 kg/m    Body mass index is 24.88 kg/m .  Physical Exam   GENERAL: flushed and fatigued   NECK: no adenopathy, no asymmetry, masses, or scars and thyroid normal to palpation  RESP: lungs clear to auscultation - no rales, rhonchi or wheezes  CV: regular rate and rhythm, normal S1 S2, no S3 or S4, no murmur, click or rub, no peripheral edema and peripheral pulses strong  ABDOMEN: soft, nontender, no hepatosplenomegaly, no masses and bowel sounds normal  MS: no gross musculoskeletal defects noted, no edema  SKIN: flushed and damp  PSYCH: mentation appears normal and anxious        Assessment & Plan     1. Fever, unspecified fever cause  She refused to let me get cxr or cbc but did agree to rapid strep and flu both of which were negative however I highly suspect she has the flu or at " least a pneumonia so treated for both. Will follow up by phone tomorrow with her. Note given for work.  - Influenza A/B antigen  - Strep, Rapid Screen  - Beta strep group A culture  - oseltamivir (TAMIFLU) 75 MG capsule; Take 1 capsule (75 mg) by mouth 2 times daily for 5 days  Dispense: 10 capsule; Refill: 0  - azithromycin (ZITHROMAX) 250 MG tablet; Two tablets first day, then one tablet daily for four days.  Dispense: 6 tablet; Refill: 0    2. Sore throat     - Strep, Rapid Screen  - Beta strep group A culture       No follow-ups on file.    Jayde Mcdonald MD  [facility]

## 2020-01-06 NOTE — LETTER
[place] [address]  [facility] [address]  [phone]          January 6, 2020    RE:  Jayleen Lang                                                                                                                                                       [address] [facility] [address]            To whom it may concern:    Jayleen Lang is under my professional care. She has the flu and  she will be unable to return to work until at least Thursday of this week and may not be able to return to work until Monday the 13th.     If you have any questions or if you need additional information in regard to this matter, please feel free to call or contact me at [facility], [address], [place] [address] or phone # [phone].   Sincerely,        Jayde Mcdonald MD

## 2020-01-07 LAB
BACTERIA SPEC CULT: NORMAL
SPECIMEN SOURCE: NORMAL

## 2020-01-07 ASSESSMENT — ANXIETY QUESTIONNAIRES: GAD7 TOTAL SCORE: 7

## 2020-01-07 NOTE — NURSING NOTE
Called patient to see how she is doing this morning per Dr. Mcdonald request. Patient states she is doing a little better but she did stay home from work today. Advised increased fluid intake per Dr. Mcdonald's recommendations. Patient actually sounded less distraught today. Asked that I let the provider know that she appreciated her being nice and taking such good care for her. Agrees to come back for follow up appointment if symptoms do not continue to improve or worsen.

## 2020-01-08 ENCOUNTER — MYC REFILL (OUTPATIENT)
Dept: INTERNAL MEDICINE | Facility: CLINIC | Age: 55
End: 2020-01-08

## 2020-01-08 DIAGNOSIS — F98.8 ATTENTION DEFICIT DISORDER, UNSPECIFIED HYPERACTIVITY PRESENCE: ICD-10-CM

## 2020-01-09 ENCOUNTER — HOSPITAL ENCOUNTER (EMERGENCY)
Facility: CLINIC | Age: 55
Discharge: HOME | End: 2020-01-09
Attending: EMERGENCY MEDICINE | Admitting: EMERGENCY MEDICINE
Payer: COMMERCIAL

## 2020-01-09 VITALS
WEIGHT: 145.5 LBS | TEMPERATURE: 97.4 F | OXYGEN SATURATION: 97 % | BODY MASS INDEX: 24.21 KG/M2 | HEART RATE: 88 BPM | RESPIRATION RATE: 20 BRPM | DIASTOLIC BLOOD PRESSURE: 102 MMHG | SYSTOLIC BLOOD PRESSURE: 135 MMHG

## 2020-01-09 DIAGNOSIS — I10 BENIGN ESSENTIAL HYPERTENSION: ICD-10-CM

## 2020-01-09 DIAGNOSIS — R00.2 PALPITATIONS: ICD-10-CM

## 2020-01-09 LAB
ANION GAP SERPL CALCULATED.3IONS-SCNC: 5 MMOL/L (ref 3–14)
B-HCG FREE SERPL-ACNC: <5 IU/L
BASOPHILS # BLD AUTO: 0 10E9/L (ref 0–0.2)
BASOPHILS NFR BLD AUTO: 0.3 %
BUN SERPL-MCNC: 16 MG/DL (ref 7–30)
CALCIUM SERPL-MCNC: 9 MG/DL (ref 8.5–10.1)
CHLORIDE SERPL-SCNC: 106 MMOL/L (ref 94–109)
CO2 SERPL-SCNC: 29 MMOL/L (ref 20–32)
CREAT SERPL-MCNC: 0.8 MG/DL (ref 0.52–1.04)
DIFFERENTIAL METHOD BLD: ABNORMAL
EOSINOPHIL # BLD AUTO: 0.1 10E9/L (ref 0–0.7)
EOSINOPHIL NFR BLD AUTO: 1.9 %
ERYTHROCYTE [DISTWIDTH] IN BLOOD BY AUTOMATED COUNT: 13.9 % (ref 10–15)
GFR SERPL CREATININE-BSD FRML MDRD: 85 ML/MIN/{1.73_M2}
GLUCOSE SERPL-MCNC: 85 MG/DL (ref 70–99)
HCT VFR BLD AUTO: 47.1 % (ref 35–47)
HGB BLD-MCNC: 15.3 G/DL (ref 11.7–15.7)
IMM GRANULOCYTES # BLD: 0 10E9/L (ref 0–0.4)
IMM GRANULOCYTES NFR BLD: 0.3 %
INTERPRETATION ECG - MUSE: NORMAL
LYMPHOCYTES # BLD AUTO: 1.1 10E9/L (ref 0.8–5.3)
LYMPHOCYTES NFR BLD AUTO: 33.8 %
MAGNESIUM SERPL-MCNC: 2.5 MG/DL (ref 1.6–2.3)
MCH RBC QN AUTO: 30 PG (ref 26.5–33)
MCHC RBC AUTO-ENTMCNC: 32.5 G/DL (ref 31.5–36.5)
MCV RBC AUTO: 92 FL (ref 78–100)
MONOCYTES # BLD AUTO: 0.3 10E9/L (ref 0–1.3)
MONOCYTES NFR BLD AUTO: 9.5 %
NEUTROPHILS # BLD AUTO: 1.7 10E9/L (ref 1.6–8.3)
NEUTROPHILS NFR BLD AUTO: 54.2 %
NRBC # BLD AUTO: 0 10*3/UL
NRBC BLD AUTO-RTO: 0 /100
NT-PROBNP SERPL-MCNC: 59 PG/ML (ref 0–900)
PLATELET # BLD AUTO: 223 10E9/L (ref 150–450)
POTASSIUM SERPL-SCNC: 3.5 MMOL/L (ref 3.4–5.3)
RBC # BLD AUTO: 5.1 10E12/L (ref 3.8–5.2)
SODIUM SERPL-SCNC: 140 MMOL/L (ref 133–144)
TROPONIN I SERPL-MCNC: <0.015 UG/L (ref 0–0.04)
TSH SERPL DL<=0.005 MIU/L-ACNC: 1.1 MU/L (ref 0.4–4)
WBC # BLD AUTO: 3.2 10E9/L (ref 4–11)

## 2020-01-09 PROCEDURE — 80048 BASIC METABOLIC PNL TOTAL CA: CPT | Performed by: EMERGENCY MEDICINE

## 2020-01-09 PROCEDURE — 85025 COMPLETE CBC W/AUTO DIFF WBC: CPT | Performed by: EMERGENCY MEDICINE

## 2020-01-09 PROCEDURE — 84484 ASSAY OF TROPONIN QUANT: CPT | Performed by: EMERGENCY MEDICINE

## 2020-01-09 PROCEDURE — 83880 ASSAY OF NATRIURETIC PEPTIDE: CPT | Performed by: EMERGENCY MEDICINE

## 2020-01-09 PROCEDURE — 93005 ELECTROCARDIOGRAM TRACING: CPT

## 2020-01-09 PROCEDURE — 84443 ASSAY THYROID STIM HORMONE: CPT | Performed by: EMERGENCY MEDICINE

## 2020-01-09 PROCEDURE — 99284 EMERGENCY DEPT VISIT MOD MDM: CPT

## 2020-01-09 PROCEDURE — 83735 ASSAY OF MAGNESIUM: CPT | Performed by: EMERGENCY MEDICINE

## 2020-01-09 PROCEDURE — 84702 CHORIONIC GONADOTROPIN TEST: CPT

## 2020-01-09 RX ORDER — METHYLPHENIDATE HYDROCHLORIDE 20 MG/1
20 TABLET ORAL 2 TIMES DAILY
Qty: 60 TABLET | Refills: 0 | Status: SHIPPED | OUTPATIENT
Start: 2020-01-09 | End: 2020-02-17

## 2020-01-09 RX ORDER — IPRATROPIUM BROMIDE AND ALBUTEROL SULFATE 2.5; .5 MG/3ML; MG/3ML
3 SOLUTION RESPIRATORY (INHALATION) ONCE
Status: DISCONTINUED | OUTPATIENT
Start: 2020-01-09 | End: 2020-01-09 | Stop reason: HOSPADM

## 2020-01-09 ASSESSMENT — ENCOUNTER SYMPTOMS
SHORTNESS OF BREATH: 1
FEVER: 0
PALPITATIONS: 1
NAUSEA: 0
VOMITING: 0
COUGH: 1
ABDOMINAL PAIN: 1
DIARRHEA: 0

## 2020-01-09 NOTE — ED AVS SNAPSHOT
[facility] Emergency Department  [address]  [facility] [address]  Phone:  [phone]  Fax:  [phone]                                    Jayleen Lang   MRN: [id number]    Department:  [facility] Emergency Department   Date of Visit:  1/9/2020           After Visit Summary Signature Page    I have received my discharge instructions, and my questions have been answered. I have discussed any challenges I see with this plan with the nurse or doctor.    ..........................................................................................................................................  Patient/Patient Representative Signature      ..........................................................................................................................................  Patient Representative Print Name and Relationship to Patient    ..................................................               ................................................  Date                                   Time    ..........................................................................................................................................  Reviewed by Signature/Title    ...................................................              ..............................................  Date                                               Time          22EPIC Rev 08/18

## 2020-01-09 NOTE — TELEPHONE ENCOUNTER
Methylphenidate refill request.   Asking for Rx by tomorrow because leaving town.     Last refill on 12/10/19 for #60    Last OV on 1/6/19 for acute issue  Last OV on 5/29/19, preop exam with CNP.   Saw Dr Castellanos last on 3/6/19     Routing refill request to provider for review/approval because:  Drug not on the G refill protocol     CSA signed on 5/2016.

## 2020-01-10 ENCOUNTER — TELEPHONE (OUTPATIENT)
Dept: INTERNAL MEDICINE | Facility: CLINIC | Age: 55
End: 2020-01-10

## 2020-01-10 ENCOUNTER — ALLIED HEALTH/NURSE VISIT (OUTPATIENT)
Dept: NURSING | Facility: CLINIC | Age: 55
End: 2020-01-10
Payer: COMMERCIAL

## 2020-01-10 VITALS — HEART RATE: 103 BPM | SYSTOLIC BLOOD PRESSURE: 121 MMHG | DIASTOLIC BLOOD PRESSURE: 78 MMHG

## 2020-01-10 DIAGNOSIS — Z53.9 DIAGNOSIS FOR ++++ WALK IN CLINIC ++++: Primary | ICD-10-CM

## 2020-01-10 NOTE — ED TRIAGE NOTES
Pt is a teacher, has been sick for about a week. Has had fever, headache, chills. Was tested negative for flu. The above stuff has been better but continues to feel short of breath and like heart is racing.

## 2020-01-10 NOTE — PROGRESS NOTES
Patient walked in to the clinic wanting to talk to a nurse.  Patient stated that her heart has been racing and is wanting her blood pressure checked.  Noted blood pressure was 121/78 and pulse was around 103-110.  Patient feeling anxiety in her chest region.  Patient feeling her heart pounding in her chest.  Patient hates her job and is very stressed out.  Patient was upset and crying during visit.  Advised patient to try some relaxation modalities, ie biofeedback, lavender essential oils and deep breathing. Patient verbalized understanding and will try these tips to help her relax.

## 2020-01-10 NOTE — ED PROVIDER NOTES
History     Chief Complaint:  Shortness of breath and palpitations     HPI   Jayleen Lang is a 54 year old female who presents with myriad of complaints, predominately shortness of breath and palpitations. The patient was seen in the clinic three days go on  for multiples days of body aches, sweating, cough, sore throat, and fever and shortness of breath that started that day. She was discharged on Tamiflu for suspected influenza-like illness as well as a Z-Omega for possible pneumonia although patient refused chest x-ray due to cost concerns. Flu and strep test were both negative. Patient returns to the ED today with persisting shortness of breath and palpitations as well as a headache. Her flu symptoms are otherwise improving and she denies recent nausea, vomiting, diarrhea, sputum-producing cough, or fever. She does have some abdominal pain, but this is somewhat chronic for her.     Allergies:  Sulfa drugs      Medications:    Xanax  Azithromycin  Epipen  Gabapentin  Lisinopril  Ritalin   Tamiflu      Past Medical History:    Chronic neck pain  Hypertension  Hyperlipidemia  REBEKAH  ADD    Past Surgical History:    Left hip arthroplasty  PCL repair  Left bunionectomy   x 2  Laminectomy/discectomy cervical  Left foot osteotomy    Family History:    Lung cancer  CHF    Social History:  Smoking status: Never  Alcohol use: Yes  Drug use: No  Patient presents alone.  PCP: New Castellanos    Marital Status:       Review of Systems   Constitutional: Negative for fever.   Respiratory: Positive for cough and shortness of breath.    Cardiovascular: Positive for palpitations.   Gastrointestinal: Positive for abdominal pain (chronic). Negative for diarrhea, nausea and vomiting.   All other systems reviewed and are negative.      Physical Exam     Patient Vitals for the past 24 hrs:   BP Temp Temp src Pulse Resp SpO2 Weight   20 1930 (!) 149/101 -- -- 87 -- 94 % --   20 1839 (!) 150/106 97.4  F  (36.3  C) Oral 104 20 99 % 66 kg (145 lb 8.1 oz)      Physical Exam  Nursing note and vitals reviewed.  Constitutional: Well nourished.   Eyes: Conjunctiva normal.  Pupils are equal, round, and reactive to light.   ENT: Nose normal. Mucous membranes pink and moist.  TM normal. No posterior oropharyngeal erythema/exudate.    Neck: Normal range of motion.  CVS: Sinus tachycardia.  Normal heart sounds.  No murmur.  Pulmonary: Lungs with scant expiratory wheezing  GI: Abdomen soft. Nontender, nondistended. No rigidity or guarding.    MSK: No calf tenderness or swelling.  Neuro: Alert. Follows simple commands.  Skin: Skin is warm and dry. No rash noted.   Psychiatric: Anxious appearing    Emergency Department Course     ECG (18:46:45):  Rate 94 bpm. AK interval 132. QRS duration 98. QT/QTc 352/440. P-R-T axes 49 29 68. Normal sinus rhythm. Nonspecific ST abnormality. Interpreted at 1847 by Jackeline Castano DO.     Laboratory:  CBC: WBC 3.2 (L), HGB 15.3,     BMP: WNL (Creatinine 0.8)   1909 Troponin: <0.015   TSH w/ free T4: 1.10  Magnesium: 2.5 (H)   BNP: 59    ISTAT HCG: <5.0    Interventions:  Refused - Duoneb 3 mL     Emergency Department Course:  Past medical records, nursing notes, and vitals reviewed.  1858: I performed an exam of the patient and obtained history, as documented above.     IV inserted and blood drawn. This was sent to laboratory for testing, findings above.  EKG was taken in the ED.      1919: Patient refused Duoneb due to concerns of cost and exacerbation of her palpitations.     2038: I rechecked the patient. Findings and plan explained to the Patient. Patient discharged home with instructions regarding supportive care, medications, and reasons to return. The importance of close follow-up was reviewed.      Impression & Plan      Medical Decision Making:  Patient is a 54-year-old female presenting with reported myriad of complaints though predominantly palpitations.  Patient is  hypertensive on arrival and quite anxious appearing.  She has nontoxic however.  EKG without focal ischemia or underlying arrhythmia.  Screening troponin negative.  Presentation would be atypical of ACS given chronicity of symptoms.  Consider possible PE though lower suspicion given reported URI.  No significant electrolyte derangements or profound anemia or pregnancy.  I did recommend formal chest x-ray given she had scant expiratory wheezing on arrival though she was declining due to cost purposes.  She expressed understanding of missed or delayed diagnoses.  She also refused albuterol treatment during her time in the ED.  I discussed with patient I strongly suspect her presentation is secondary to more influenza-like illness.  She already is on outpatient Tamiflu as well as azithromycin empirically for management of pneumonia.  No indication to change antibiotics at this point in time.  The patient is not septic appearing nor she hypoxic.  I did recommend albuterol for dispo as needed though patient declining.  We discussed that should her palpitations persist she may benefit from outpatient Holter monitoring.  Planning close PCP follow-up.  Return precautions given.  Also recommended BP diary at home given mildly elevated pressure, though no evidence to suggest end organ dysfunction today.      Diagnosis:    ICD-10-CM    1. Benign essential hypertension I10    2. Palpitations R00.2        Disposition:  discharged to home    Discharge Medications:  New Prescriptions    No medications on file     Greg Fiore  1/9/2020   [facility] EMERGENCY DEPARTMENT    Scribe Disclosure:  I, Greg Fiore, am serving as a scribe at 6:58 PM on 1/9/2020 to document services personally performed by Jackeline Castano DO based on my observations and the provider's statements to me.        Jackeline Castano DO  01/09/20 2039

## 2020-01-13 ENCOUNTER — HOSPITAL ENCOUNTER (OUTPATIENT)
Dept: MAMMOGRAPHY | Facility: CLINIC | Age: 55
Discharge: HOME | End: 2020-01-13
Attending: INTERNAL MEDICINE | Admitting: INTERNAL MEDICINE
Payer: COMMERCIAL

## 2020-01-13 DIAGNOSIS — Z12.31 SCREENING MAMMOGRAM, ENCOUNTER FOR: ICD-10-CM

## 2020-01-13 PROCEDURE — 77067 SCR MAMMO BI INCL CAD: CPT

## 2020-01-21 DIAGNOSIS — F41.1 GAD (GENERALIZED ANXIETY DISORDER): ICD-10-CM

## 2020-01-23 RX ORDER — ALPRAZOLAM 0.5 MG
TABLET ORAL
Qty: 30 TABLET | Refills: 0 | Status: CANCELLED | OUTPATIENT
Start: 2020-01-23

## 2020-01-23 NOTE — TELEPHONE ENCOUNTER
Attempted to contact pt. Left message to call clinic to schedule appt.     Last OV with Dr Castellanos was 3/6/19.   Otherwise only been in for acute issue x 1 or Preop x 1.

## 2020-01-23 NOTE — TELEPHONE ENCOUNTER
Alprazolam refill request.     Last refill on 12/26/19 for #30     Last OV on 1/6/20 with Dr Mcdonald.     Routing refill request to provider for review/approval because:  Drug not on the [facility] refill protocol

## 2020-01-27 ENCOUNTER — TELEPHONE (OUTPATIENT)
Dept: NURSING | Facility: CLINIC | Age: 55
End: 2020-01-27

## 2020-01-27 ENCOUNTER — NURSE TRIAGE (OUTPATIENT)
Dept: NURSING | Facility: CLINIC | Age: 55
End: 2020-01-27

## 2020-01-27 DIAGNOSIS — F41.1 GAD (GENERALIZED ANXIETY DISORDER): ICD-10-CM

## 2020-01-27 RX ORDER — ALPRAZOLAM 0.5 MG
TABLET ORAL
Qty: 30 TABLET | Refills: 0 | Status: SHIPPED | OUTPATIENT
Start: 2020-01-27 | End: 2020-02-25

## 2020-01-27 NOTE — TELEPHONE ENCOUNTER
Controlled Substance Refill Request for xanax  Problem List Complete:  No     PROVIDER TO CONSIDER COMPLETION OF PROBLEM LIST AND OVERVIEW/CONTROLLED SUBSTANCE AGREEMENT    Last Written Prescription Date:  12/26/19  Last Fill Quantity: 30,   # refills: 0    THE MOST RECENT OFFICE VISIT MUST BE WITHIN THE PAST 3 MONTHS. AT LEAST ONE FACE TO FACE VISIT MUST OCCUR EVERY 6 MONTHS. ADDITIONAL VISITS CAN BE VIRTUAL.      Last Office Visit with [facility] primary care provider: last office visit with primary care provider was 3/6/19    Future Office visit:   Next 5 appointments (look out 90 days)    Feb 10, 2020  4:20 PM CST  SHORT with New Castellanos MD  [facility] [address] [facility] [id number] [phone]          Controlled substance agreement:   Encounter-Level CSA - 05/16/2016:    Controlled Substance Agreement - Scan on 5/26/2016 12:16 PM: Controlled Substance Agreement-5/17/2016     Patient-Level CSA:    There are no patient-level csa.         Last Urine Drug Screen: No results found for: CDAUT, No results found for: COMDAT, No results found for: THC13, PCP13, COC13, MAMP13, OPI13, AMP13, BZO13, TCA13, MTD13, BAR13, OXY13, PPX13, BUP13     Processing:  Rx to be electronically transmitted to pharmacy by provider      [email or web address]    Writer unable to check -not authorized by provider.

## 2020-01-27 NOTE — TELEPHONE ENCOUNTER
Alprazolam XANAX refill requested. She called one week ago. Please call patient so she knows the status. [facility] in [place] on [address].  Epic encounter sent to the patient's clinic.    Thank you,  Kaylene Villarreal RN-[facility]

## 2020-01-27 NOTE — TELEPHONE ENCOUNTER
Alprazolam XANAX refill requested. She called one week ago. Please call patient so she knows the status. [facility] in [place] on [address].  Thank you,  Kaylene Villarreal RN-[facility]

## 2020-02-04 ENCOUNTER — TELEPHONE (OUTPATIENT)
Dept: NURSING | Facility: CLINIC | Age: 55
End: 2020-02-04

## 2020-02-04 NOTE — TELEPHONE ENCOUNTER
Patient Requesitng refill on Methyophenodate. Provider and team copied on this encounter.    Lidia Carlos RN - [facility]  2/03/2020   6:35AM

## 2020-02-11 ENCOUNTER — TELEPHONE (OUTPATIENT)
Dept: NURSING | Facility: CLINIC | Age: 55
End: 2020-02-11

## 2020-02-11 ENCOUNTER — NURSE TRIAGE (OUTPATIENT)
Dept: NURSING | Facility: CLINIC | Age: 55
End: 2020-02-11

## 2020-02-11 ENCOUNTER — MYC REFILL (OUTPATIENT)
Dept: INTERNAL MEDICINE | Facility: CLINIC | Age: 55
End: 2020-02-11

## 2020-02-11 DIAGNOSIS — F98.8 ATTENTION DEFICIT DISORDER, UNSPECIFIED HYPERACTIVITY PRESENCE: ICD-10-CM

## 2020-02-11 RX ORDER — METHYLPHENIDATE HYDROCHLORIDE 20 MG/1
20 TABLET ORAL 2 TIMES DAILY
Qty: 60 TABLET | Refills: 0 | Status: CANCELLED | OUTPATIENT
Start: 2020-02-11

## 2020-02-12 ENCOUNTER — NURSE TRIAGE (OUTPATIENT)
Dept: NURSING | Facility: CLINIC | Age: 55
End: 2020-02-12

## 2020-02-12 ENCOUNTER — MYC MEDICAL ADVICE (OUTPATIENT)
Dept: INTERNAL MEDICINE | Facility: CLINIC | Age: 55
End: 2020-02-12

## 2020-02-12 NOTE — TELEPHONE ENCOUNTER
Jayleen calls and says that she wants her Ritalin reordered. Jayleen says that she wants this message left for Dr. Castellanos. RN then left this message in Epic as requested.  Reason for Disposition    [1] Caller requesting NON-URGENT health information AND [2] PCP's office is the best resource    Additional Information    Negative: [1] Caller is not with the adult (patient) AND [2] reporting urgent symptoms    Negative: Lab result questions    Negative: Medication questions    Negative: Caller can't be reached by phone    Negative: Caller has already spoken to PCP or another triager    Negative: RN needs further essential information from caller in order to complete triage    Negative: Requesting regular office appointment    Protocols used: INFORMATION ONLY CALL-A-AH

## 2020-02-12 NOTE — TELEPHONE ENCOUNTER
Reason for Call: Jayleen calls and says that she wants her Ritalin reordered. Jayleen says that she wants this message left for Dr. Castellanos. RN then left this message in Epic as requested.  Routed to: MARILEE Bonilla RN   [facility]  [phone]

## 2020-02-12 NOTE — TELEPHONE ENCOUNTER
Controlled Substance Refill Request for Ritalin  Problem List Complete:  No     PROVIDER TO CONSIDER COMPLETION OF PROBLEM LIST AND OVERVIEW/CONTROLLED SUBSTANCE AGREEMENT    Last Written Prescription Date:  1/9/20  Last Fill Quantity: 60,   # refills: 0    THE MOST RECENT OFFICE VISIT MUST BE WITHIN THE PAST 3 MONTHS. AT LEAST ONE FACE TO FACE VISIT MUST OCCUR EVERY 6 MONTHS. ADDITIONAL VISITS CAN BE VIRTUAL.  (THIS STATEMENT SHOULD BE DELETED.)    Last Office Visit with [facility] primary care provider: 1/6/20    Future Office visit:   Next 5 appointments (look out 90 days)    Feb 18, 2020  5:00 PM CST  SHORT [facility] [address] [place] [address] [phone]          Controlled substance agreement:   Encounter-Level CSA - 05/16/2016:    Controlled Substance Agreement - Scan on 5/26/2016 12:16 PM: Controlled Substance Agreement-5/17/2016     Patient-Level CSA:    There are no patient-level csa.         Last Urine Drug Screen: No results found for: CDAUT, No results found for: COMDAT, No results found for: THC13, PCP13, COC13, MAMP13, OPI13, AMP13, BZO13, TCA13, MTD13, BAR13, OXY13, PPX13, BUP13     Writer is not authorized to check  for provider

## 2020-02-13 NOTE — TELEPHONE ENCOUNTER
Caller requesting refill of  Ritalin   States has been waiting 10 days for response   Review of EMR reveals;     Conversation: Refill Request   (Newest Message First)   February 12, 2020              4:20 PM   New Castellanos MD routed this conversation to New Mack MD           4:20 PM   Note      Unable to refill. Has not been seen        Patient informed; states she  has a pending appointment    Dian Herrera RN  [facility]         Reason for Disposition    Caller requesting a NON-URGENT new prescription or refill and triager unable to refill per unit policy    Additional Information    Medication questions    Protocols used: MEDICATION QUESTION CALL-A-AH, INFORMATION ONLY CALL-A-AH

## 2020-02-17 ENCOUNTER — OFFICE VISIT (OUTPATIENT)
Dept: INTERNAL MEDICINE | Facility: CLINIC | Age: 55
End: 2020-02-17
Payer: COMMERCIAL

## 2020-02-17 VITALS
HEART RATE: 100 BPM | SYSTOLIC BLOOD PRESSURE: 124 MMHG | HEIGHT: 65 IN | TEMPERATURE: 98.5 F | WEIGHT: 146 LBS | OXYGEN SATURATION: 97 % | BODY MASS INDEX: 24.32 KG/M2 | DIASTOLIC BLOOD PRESSURE: 82 MMHG | RESPIRATION RATE: 20 BRPM

## 2020-02-17 DIAGNOSIS — E78.5 HYPERLIPIDEMIA LDL GOAL <130: ICD-10-CM

## 2020-02-17 DIAGNOSIS — I10 ESSENTIAL HYPERTENSION, BENIGN: Primary | ICD-10-CM

## 2020-02-17 DIAGNOSIS — F98.8 ATTENTION DEFICIT DISORDER, UNSPECIFIED HYPERACTIVITY PRESENCE: ICD-10-CM

## 2020-02-17 DIAGNOSIS — F41.1 GAD (GENERALIZED ANXIETY DISORDER): ICD-10-CM

## 2020-02-17 PROCEDURE — 99214 OFFICE O/P EST MOD 30 MIN: CPT | Performed by: INTERNAL MEDICINE

## 2020-02-17 RX ORDER — METHYLPHENIDATE HYDROCHLORIDE 20 MG/1
20 TABLET ORAL 2 TIMES DAILY
Qty: 60 TABLET | Refills: 0 | Status: SHIPPED | OUTPATIENT
Start: 2020-02-17 | End: 2020-03-14

## 2020-02-17 ASSESSMENT — MIFFLIN-ST. JEOR: SCORE: 1273.13

## 2020-02-17 NOTE — PROGRESS NOTES
Subjective     Jayleen Lang is a 54 year old female who presents to clinic today for the following health issues:    HPI   Hyperlipidemia Follow-Up      Are you regularly taking any medication or supplement to lower your cholesterol?   No    Are you having muscle aches or other side effects that you think could be caused by your cholesterol lowering medication?  N/A    Hypertension Follow-up  Has h/o HTN. on medical treatment. BP has been controlled. No side effects from medications. No CP, HA, dizziness. good compliance with medications and low salt diet.      Do you check your blood pressure regularly outside of the clinic? Yes     Are you following a low salt diet? Yes    Are your blood pressures ever more than 140 on the top number (systolic) OR more   than 90 on the bottom number (diastolic), for example 140/90? No      How many servings of fruits and vegetables do you eat daily?  2-3    On average, how many sweetened beverages do you drink each day (Examples: soda, juice, sweet tea, etc.  Do NOT count diet or artificially sweetened beverages)?   0    How many days per week do you exercise enough to make your heart beat faster? 4    How many minutes a day do you exercise enough to make your heart beat faster? 10 - 19    How many days per week do you miss taking your medication? 0    Has h/o anxiety. On PRN Alprazolam. No side effects. Helps with symptoms of stress.     Has h/o ADHD. On Ritlin. Has been using for years. Helps with her work, attention, concentration.     Concern for right knee pain. On and off. Has h/o meniscus tear. No swelling. Has pain when driving in the car.   Takes as needed Ibuprofen. Seen ortho.       Patient Active Problem List   Diagnosis     Rash     Menorrhagia     Obesity     REBEKAH (generalized anxiety disorder)     Essential hypertension, benign     Allergic reaction     Hyperlipidemia LDL goal <130     Chronic neck pain     Controlled substance agreement signed     Attention deficit  disorder, unspecified hyperactivity presence     Past Surgical History:   Procedure Laterality Date     ARTHROPLASTY HIP  2017    left hip replaced     ARTHROSCOPIC REPAIR POSTERIOR CRUCIATE LIGAMENT       BUNIONECTOMY Left 2018    Procedure: BUNIONECTOMY;  1.  Closing base osteotomy, first metatarsal, left foot.   2.  Akin osteotomy, left great toe. ;  Surgeon: Eliezer Parra DPM;  Location:  OR      SECTION  10/1998    x2     COLONOSCOPY  06/15/2018    Dr. So [facility]     COLONOSCOPY N/A 6/15/2018    Procedure: COLONOSCOPY;  Colonoscopy ;  Surgeon: Luis Fernando So MD;  Location:  GI     Foot surgery - bone spurs  2002     LAMINECT/DISCECTOMY, CERVICAL      C6-C7 Fusion.     OSTEOTOMY FOOT Left 2018    Procedure: OSTEOTOMY FOOT;;  Surgeon: Eliezer Parra DPM;  Location:  OR       Social History     Tobacco Use     Smoking status: Never Smoker     Smokeless tobacco: Never Used   Substance Use Topics     Alcohol use: Yes     Comment: 2 -3  beers a week     Family History   Problem Relation Age of Onset     Cancer Mother         lung     Heart Disease Father         congestive heart failure     Colon Cancer No family hx of          Current Outpatient Medications   Medication Sig Dispense Refill     ALPRAZolam (XANAX) 0.5 MG tablet TAKE ONE TABLET BY MOUTH DAILY AS NEEDED FOR ANXIETY 30 tablet 0     cetirizine (ZYRTEC) 10 MG tablet Take 1 tablet (10 mg) by mouth every evening 90 tablet 3     EPINEPHrine (EPIPEN/ADRENACLICK/OR ANY BX GENERIC EQUIV) 0.3 MG/0.3ML injection 2-pack Inject 0.3 mLs (0.3 mg) into the muscle once as needed for anaphylaxis 0.3 mL 1     fluticasone (FLONASE) 50 MCG/ACT nasal spray Spray 1-2 sprays into both nostrils daily 48 g 3     gabapentin (NEURONTIN) 600 MG tablet TAKE 1 TABLET BY MOUTH 3 TIMES DAILY 270 tablet 1     lisinopril (PRINIVIL/ZESTRIL) 20 MG tablet TAKE ONE TABLET BY MOUTH EVERY DAY 90 tablet 2     methylphenidate (RITALIN) 20 MG tablet Take 1  "tablet (20 mg) by mouth 2 times daily 60 tablet 0     MULTIPLE VITAMIN PO Take 1 tablet by mouth daily           Reviewed and updated as needed this visit by Provider         Review of Systems   ROS COMP: Constitutional, HEENT, cardiovascular, pulmonary, GI, , musculoskeletal, neuro, skin, endocrine and psych systems are negative, except as otherwise noted.      Objective    /82   Pulse 100   Temp 98.5  F (36.9  C) (Oral)   Resp 20   Ht 1.651 m (5' 5\")   Wt 66.2 kg (146 lb)   LMP  (LMP Unknown)   SpO2 97%   BMI 24.30 kg/m    Body mass index is 24.3 kg/m .  Physical Exam   GENERAL: healthy, alert and no distress  EYES: Eyes grossly normal to inspection, PERRL and conjunctivae and sclerae normal  HENT: ear canals and TM's normal, nose and mouth without ulcers or lesions  NECK: no adenopathy, no asymmetry, masses, or scars and thyroid normal to palpation  RESP: lungs clear to auscultation - no rales, rhonchi or wheezes  CV: regular rate and rhythm, normal S1 S2, no S3 or S4, no murmur, click or rub, no peripheral edema and peripheral pulses strong  ABDOMEN: soft, nontender, no hepatosplenomegaly, no masses and bowel sounds normal  MS: no gross musculoskeletal defects noted, no edema  SKIN: no suspicious lesions or rashes  NEURO: Normal strength and tone, mentation intact and speech normal    Diagnostic Test Results:  Labs reviewed in Epic        Assessment & Plan   Problem List Items Addressed This Visit     REBEKAH (generalized anxiety disorder)    Essential hypertension, benign - Primary    Hyperlipidemia LDL goal <130    Attention deficit disorder, unspecified hyperactivity presence    Relevant Medications    methylphenidate (RITALIN) 20 MG tablet         Continue treatment  Discussed medications side effects  Needs to try to decrease the use of Ritalin and Alprazolam to as needed , weekend medication vacation         See Patient Instructions  Return in about 6 months (around 8/17/2020) for Physical " Exam.    New Castellanos MD  [facility]

## 2020-02-23 DIAGNOSIS — F41.1 GAD (GENERALIZED ANXIETY DISORDER): ICD-10-CM

## 2020-02-23 DIAGNOSIS — G62.9 NEUROPATHY: ICD-10-CM

## 2020-02-25 RX ORDER — GABAPENTIN 600 MG/1
TABLET ORAL
Qty: 270 TABLET | Refills: 0 | Status: SHIPPED | OUTPATIENT
Start: 2020-02-25 | End: 2020-07-01

## 2020-02-25 RX ORDER — ALPRAZOLAM 0.5 MG
TABLET ORAL
Qty: 30 TABLET | Refills: 0 | Status: SHIPPED | OUTPATIENT
Start: 2020-02-25 | End: 2020-03-23

## 2020-02-25 NOTE — TELEPHONE ENCOUNTER
Requested Prescriptions   Pending Prescriptions Disp Refills     ALPRAZolam (XANAX) 0.5 MG tablet [Pharmacy Med Name: ALPRAZolam Oral  Last Written Prescription Date:  1/27/2020  Last Fill Quantity: 30,  # refills: 0   Last office visit: No previous visit found with prescribing provider:     Future Office Visit:   Tablet 0.5 MG] 30 tablet 0     Sig: TAKE ONE TABLET BY MOUTH DAILY AS NEEDED FOR ANXIETY       There is no refill protocol information for this order        gabapentin (NEURONTIN) 600 MG tablet [Pharmacy Med Name: Gabapentin Oral  Last Written Prescription Date:  6/12/2019  Last Fill Quantity: 270,  # refills: 1   Last office visit: No previous visit found with prescribing provider:     Future Office Visit:   Tablet 600 MG] 180 tablet 0     Sig: TAKE 1 TABLET BY MOUTH 3 TIMES DAILY       There is no refill protocol information for this order

## 2020-02-25 NOTE — TELEPHONE ENCOUNTER
Xanax and gabapentin refills.     Last refill of xanax on 1/27/20 for #30    Last refill of gabapentin on 6/12/19 for #270 with 1 refill.     Last OV on  2/17/20     Routing refill request to provider for review/approval because:  Drug not on the [facility] refill protocol

## 2020-03-14 ENCOUNTER — MYC REFILL (OUTPATIENT)
Dept: INTERNAL MEDICINE | Facility: CLINIC | Age: 55
End: 2020-03-14

## 2020-03-14 DIAGNOSIS — F98.8 ATTENTION DEFICIT DISORDER, UNSPECIFIED HYPERACTIVITY PRESENCE: ICD-10-CM

## 2020-03-14 DIAGNOSIS — T78.40XD ALLERGIC REACTION, SUBSEQUENT ENCOUNTER: Primary | ICD-10-CM

## 2020-03-15 DIAGNOSIS — I10 ESSENTIAL HYPERTENSION: ICD-10-CM

## 2020-03-16 RX ORDER — LISINOPRIL 20 MG/1
TABLET ORAL
Qty: 90 TABLET | Refills: 1 | Status: SHIPPED | OUTPATIENT
Start: 2020-03-16 | End: 2020-07-01

## 2020-03-16 NOTE — TELEPHONE ENCOUNTER
methylphenidate (RITALIN)      Last Written Prescription Date:  2/17/20  Last Fill Quantity: 60,   # refills: 0  Last Office Visit: 2/17/20  Future Office visit:       Routing refill request to provider for review/approval because:  Drug not on the [facility], [facility] or [facility] refill protocol or controlled substance

## 2020-03-17 RX ORDER — EPINEPHRINE 0.3 MG/.3ML
0.3 INJECTION SUBCUTANEOUS
Qty: 0.3 ML | Refills: 1 | Status: SHIPPED | OUTPATIENT
Start: 2020-03-17 | End: 2020-06-18

## 2020-03-17 RX ORDER — METHYLPHENIDATE HYDROCHLORIDE 20 MG/1
20 TABLET ORAL 2 TIMES DAILY
Qty: 60 TABLET | Refills: 0 | Status: SHIPPED | OUTPATIENT
Start: 2020-03-17 | End: 2020-04-13

## 2020-03-17 NOTE — TELEPHONE ENCOUNTER
Pt calling to check on below refill request. She will be leaving out of tomorrow to go take care of her Aunt. Please advise. Thanks.    Controlled Substance Refill Request for Ritalin  Problem List Complete:  No     PROVIDER TO CONSIDER COMPLETION OF PROBLEM LIST AND OVERVIEW/CONTROLLED SUBSTANCE AGREEMENT    Last Written Prescription Date:  2/17/20  Last Fill Quantity: 60,   # refills: 0    THE MOST RECENT OFFICE VISIT MUST BE WITHIN THE PAST 3 MONTHS. AT LEAST ONE FACE TO FACE VISIT MUST OCCUR EVERY 6 MONTHS. ADDITIONAL VISITS CAN BE VIRTUAL.  (THIS STATEMENT SHOULD BE DELETED.)    Last Office Visit with [facility] primary care provider: 2/17/20    Future Office visit:     Controlled substance agreement:   Encounter-Level CSA - 05/16/2016:    Controlled Substance Agreement - Scan on 5/26/2016 12:16 PM: Controlled Substance Agreement-5/17/2016     Patient-Level CSA:    There are no patient-level csa.         Last Urine Drug Screen: No results found for: CDAUT, No results found for: COMDAT, No results found for: THC13, PCP13, COC13, MAMP13, OPI13, AMP13, BZO13, TCA13, MTD13, BAR13, OXY13, PPX13, BUP13     Processing:  Rx to be electronically transmitted to pharmacy by provider      [email or web address]       checked in past 3 months?  No, route to RN

## 2020-03-19 ENCOUNTER — MYC REFILL (OUTPATIENT)
Dept: INTERNAL MEDICINE | Facility: CLINIC | Age: 55
End: 2020-03-19

## 2020-03-19 DIAGNOSIS — Z91.09 POLLEN ALLERGIES: ICD-10-CM

## 2020-03-19 DIAGNOSIS — J30.2 CHRONIC SEASONAL ALLERGIC RHINITIS, UNSPECIFIED TRIGGER: ICD-10-CM

## 2020-03-19 RX ORDER — FLUTICASONE PROPIONATE 50 MCG
1-2 SPRAY, SUSPENSION (ML) NASAL DAILY
Qty: 48 G | Refills: 3 | Status: CANCELLED | OUTPATIENT
Start: 2020-03-19

## 2020-03-19 RX ORDER — CETIRIZINE HYDROCHLORIDE 10 MG/1
10 TABLET ORAL EVERY EVENING
Qty: 90 TABLET | Refills: 3 | Status: CANCELLED | OUTPATIENT
Start: 2020-03-19

## 2020-03-19 NOTE — TELEPHONE ENCOUNTER
"Requested Prescriptions   Pending Prescriptions Disp Refills     cetirizine (ZYRTEC) 10 MG tablet 90 tablet 3     Sig: Take 1 tablet (10 mg) by mouth every evening   Last Written Prescription Date:  1/3/18  Last Fill Quantity: 90,  # refills: 3   Last office visit: 2/17/2020 with prescribing provider:    Future Office Visit:        Antihistamines Protocol Passed - 3/19/2020 12:39 PM        Passed - Patient is 3-64 years of age     Apply weight-based dosing for peds patients age 3 - 12 years of age.    Forward request to provider for patients under the age of 3 or over the age of 64.          Passed - Recent (12 mo) or future (30 days) visit within the authorizing provider's specialty     Patient has had an office visit with the authorizing provider or a provider within the authorizing providers department within the previous 12 mos or has a future within next 30 days. See \"Patient Info\" tab in inbasket, or \"Choose Columns\" in Meds & Orders section of the refill encounter.              Passed - Medication is active on med list           Pietro Muro , [facility]  03/19/20 2:31 PM     "

## 2020-03-20 DIAGNOSIS — J30.2 CHRONIC SEASONAL ALLERGIC RHINITIS: ICD-10-CM

## 2020-03-22 ENCOUNTER — HEALTH MAINTENANCE LETTER (OUTPATIENT)
Age: 55
End: 2020-03-22

## 2020-03-22 RX ORDER — CETIRIZINE HYDROCHLORIDE 10 MG/1
TABLET ORAL
Qty: 90 TABLET | Refills: 2 | Status: SHIPPED | OUTPATIENT
Start: 2020-03-22

## 2020-03-23 ENCOUNTER — MYC REFILL (OUTPATIENT)
Dept: NURSING | Facility: CLINIC | Age: 55
End: 2020-03-23

## 2020-03-23 DIAGNOSIS — F41.1 GAD (GENERALIZED ANXIETY DISORDER): ICD-10-CM

## 2020-03-23 RX ORDER — ALPRAZOLAM 0.5 MG
0.5 TABLET ORAL DAILY PRN
Qty: 30 TABLET | Refills: 0 | Status: SHIPPED | OUTPATIENT
Start: 2020-03-23 | End: 2020-04-20

## 2020-03-23 NOTE — TELEPHONE ENCOUNTER
ALPRAZolam (XANAX) 0.5 MG tablet      Last Written Prescription Date:  2/25/20  Last Fill Quantity: 30,   # refills: 0  Last Office Visit: 2/17/20  Future Office visit:       Routing refill request to provider for review/approval because:  Drug not on the [facility], [facility] or [facility] refill protocol or controlled substance    Pietro Jina , [facility]  03/23/20 1:30 PM

## 2020-03-23 NOTE — TELEPHONE ENCOUNTER
Xanax refill request    Last refill on 2/25/20 for #30 with no refills.     Last OV on 2/17/20     Routing refill request to provider for review/approval because:  Drug not on the [facility] refill protocol     CSA signed. 2016.

## 2020-03-23 NOTE — TELEPHONE ENCOUNTER
Flonase NS      Last Written Prescription Date:  6/16/16  Last Fill Quantity: 48g,   # refills: 3  Last Office Visit: 2/17/20  Future Office visit:       Routing refill request to provider for review/approval because:  Break in medication as last refill was 6/2016    Please advise, thanks.

## 2020-04-13 ENCOUNTER — MYC REFILL (OUTPATIENT)
Dept: INTERNAL MEDICINE | Facility: CLINIC | Age: 55
End: 2020-04-13

## 2020-04-13 DIAGNOSIS — F98.8 ATTENTION DEFICIT DISORDER, UNSPECIFIED HYPERACTIVITY PRESENCE: ICD-10-CM

## 2020-04-13 RX ORDER — METHYLPHENIDATE HYDROCHLORIDE 20 MG/1
20 TABLET ORAL 2 TIMES DAILY
Qty: 60 TABLET | Refills: 0 | Status: SHIPPED | OUTPATIENT
Start: 2020-04-13 | End: 2020-05-10

## 2020-04-13 NOTE — TELEPHONE ENCOUNTER
Last Written Prescription Date:  3/17/20  Last Fill Quantity: 60,  # refills: 0   Last office visit: 2/17/2020 with prescribing provider:  2/17/20   Future Office Visit:

## 2020-04-17 DIAGNOSIS — F41.1 GAD (GENERALIZED ANXIETY DISORDER): ICD-10-CM

## 2020-04-20 ENCOUNTER — MYC REFILL (OUTPATIENT)
Dept: NURSING | Facility: CLINIC | Age: 55
End: 2020-04-20

## 2020-04-20 DIAGNOSIS — F41.1 GAD (GENERALIZED ANXIETY DISORDER): ICD-10-CM

## 2020-04-20 RX ORDER — ALPRAZOLAM 0.5 MG
0.5 TABLET ORAL DAILY PRN
Qty: 30 TABLET | Refills: 0 | Status: SHIPPED | OUTPATIENT
Start: 2020-04-20 | End: 2020-05-18

## 2020-04-20 RX ORDER — ALPRAZOLAM 0.5 MG
0.5 TABLET ORAL DAILY PRN
Qty: 30 TABLET | Refills: 0 | Status: CANCELLED | OUTPATIENT
Start: 2020-04-20

## 2020-04-20 NOTE — TELEPHONE ENCOUNTER
Routing refill request to provider for review/approval because:  Drug not on the [facility] refill protocol     Last Written Prescription Date: 03/23/20  Last Fill Quantity: 30,  # refills: 0  Last office visit: 02/17/20 with Dr. Castellanos  Future Office Visit:  none    Mini Galeano RN.

## 2020-05-10 ENCOUNTER — MYC REFILL (OUTPATIENT)
Dept: INTERNAL MEDICINE | Facility: CLINIC | Age: 55
End: 2020-05-10

## 2020-05-10 DIAGNOSIS — I10 ESSENTIAL HYPERTENSION: ICD-10-CM

## 2020-05-10 DIAGNOSIS — F98.8 ATTENTION DEFICIT DISORDER, UNSPECIFIED HYPERACTIVITY PRESENCE: ICD-10-CM

## 2020-05-11 RX ORDER — LISINOPRIL 20 MG/1
20 TABLET ORAL DAILY
Qty: 90 TABLET | Refills: 1 | OUTPATIENT
Start: 2020-05-11

## 2020-05-11 RX ORDER — METHYLPHENIDATE HYDROCHLORIDE 20 MG/1
20 TABLET ORAL 2 TIMES DAILY
Qty: 60 TABLET | Refills: 0 | Status: SHIPPED | OUTPATIENT
Start: 2020-05-11 | End: 2020-06-08

## 2020-05-11 NOTE — TELEPHONE ENCOUNTER
Medication refused, prescription refilled on 3/16/20 for a 90 day supply with 1 refill.     Myc message sent to patient.

## 2020-05-11 NOTE — TELEPHONE ENCOUNTER
Controlled Substance Refill Request for ritalin  Problem List Complete:  No     PROVIDER TO CONSIDER COMPLETION OF PROBLEM LIST AND OVERVIEW/CONTROLLED SUBSTANCE AGREEMENT    Last Written Prescription Date:  4/13/20  Last Fill Quantity: 60,   # refills: 0    Last Office Visit with [facility] primary care provider: 2/17/20    Future Office visit:     Controlled substance agreement:   Encounter-Level CSA - 05/16/2016:    Controlled Substance Agreement - Scan on 5/26/2016 12:16 PM: Controlled Substance Agreement-5/17/2016     Patient-Level CSA:    There are no patient-level csa.         Last Urine Drug Screen: No results found for: CDAUT, No results found for: COMDAT, No results found for: THC13, PCP13, COC13, MAMP13, OPI13, AMP13, BZO13, TCA13, MTD13, BAR13, OXY13, PPX13, BUP13     Processing:  Rx to be electronically transmitted to pharmacy by provider      [email or web address]      Writer unable to check -not authorized by provider.

## 2020-05-18 ENCOUNTER — MYC REFILL (OUTPATIENT)
Dept: NURSING | Facility: CLINIC | Age: 55
End: 2020-05-18

## 2020-05-18 DIAGNOSIS — F41.1 GAD (GENERALIZED ANXIETY DISORDER): ICD-10-CM

## 2020-05-19 ENCOUNTER — MYC REFILL (OUTPATIENT)
Dept: NURSING | Facility: CLINIC | Age: 55
End: 2020-05-19

## 2020-05-19 DIAGNOSIS — F41.1 GAD (GENERALIZED ANXIETY DISORDER): ICD-10-CM

## 2020-05-19 RX ORDER — ALPRAZOLAM 0.5 MG
0.5 TABLET ORAL DAILY PRN
Qty: 30 TABLET | Refills: 0 | Status: CANCELLED | OUTPATIENT
Start: 2020-05-19

## 2020-05-20 RX ORDER — ALPRAZOLAM 0.5 MG
0.5 TABLET ORAL DAILY PRN
Qty: 30 TABLET | Refills: 0 | Status: SHIPPED | OUTPATIENT
Start: 2020-05-20 | End: 2020-06-17

## 2020-05-20 NOTE — TELEPHONE ENCOUNTER
Controlled Substance Refill Request for Xanax  Problem List Complete:  No     PROVIDER TO CONSIDER COMPLETION OF PROBLEM LIST AND OVERVIEW/CONTROLLED SUBSTANCE AGREEMENT    Last Written Prescription Date:  4/20/20  Last Fill Quantity: 30,   # refills: 0      Last Office Visit with [facility] primary care provider: 2/17/20    Future Office visit:     Controlled substance agreement:   Encounter-Level CSA - 05/16/2016:    Controlled Substance Agreement - Scan on 5/26/2016 12:16 PM: Controlled Substance Agreement-5/17/2016     Patient-Level CSA:    There are no patient-level csa.         Last Urine Drug Screen: No results found for: CDAUT, No results found for: COMDAT, No results found for: THC13, PCP13, COC13, MAMP13, OPI13, AMP13, BZO13, TCA13, MTD13, BAR13, OXY13, PPX13, BUP13     Processing:  Rx to be electronically transmitted to pharmacy by provider      Not reviewed - provider has not provided access to this RN    Routing refill request to provider for review/approval because:  Drug not on the [facility] refill protocol

## 2020-06-08 ENCOUNTER — MYC REFILL (OUTPATIENT)
Dept: INTERNAL MEDICINE | Facility: CLINIC | Age: 55
End: 2020-06-08

## 2020-06-08 DIAGNOSIS — F98.8 ATTENTION DEFICIT DISORDER, UNSPECIFIED HYPERACTIVITY PRESENCE: ICD-10-CM

## 2020-06-09 RX ORDER — METHYLPHENIDATE HYDROCHLORIDE 20 MG/1
20 TABLET ORAL 2 TIMES DAILY
Qty: 60 TABLET | Refills: 0 | Status: SHIPPED | OUTPATIENT
Start: 2020-06-09 | End: 2020-07-07

## 2020-06-09 NOTE — TELEPHONE ENCOUNTER
Ritalin refill request.     Last refill on 5/11/20 for #60     Last OV on 2/17/20     Routing refill request to provider for review/approval because:  Drug not on the [facility] refill protocol     [place]  reviewed today, no concerns.   Ritalin last refilled on 5/13/20 for #60        
Abdomen soft, non-tender, no guarding.

## 2020-06-14 DIAGNOSIS — F41.1 GAD (GENERALIZED ANXIETY DISORDER): ICD-10-CM

## 2020-06-16 NOTE — TELEPHONE ENCOUNTER
Controlled Substance Refill Request for Alprazolam   Problem List Complete:  No     PROVIDER TO CONSIDER COMPLETION OF PROBLEM LIST AND OVERVIEW/CONTROLLED SUBSTANCE AGREEMENT    Last Written Prescription Date:  5/20/20  Last Fill Quantity: 30,   # refills: 0    Last Office Visit with [facility] [place] [facility] primary care provider: 2/17/20    Future Office visit:     Controlled substance agreement:   Encounter-Level CSA - 05/16/2016:    Controlled Substance Agreement - Scan on 5/26/2016 12:16 PM: Controlled Substance Agreement-5/17/2016     Patient-Level CSA:    There are no patient-level csa.         Last Urine Drug Screen: No results found for: CDAUT, No results found for: COMDAT, No results found for: THC13, PCP13, COC13, MAMP13, OPI13, AMP13, BZO13, TCA13, MTD13, BAR13, OXY13, PPX13, BUP13      [email or web address]    Writer is not authorized to check  for provider

## 2020-06-17 RX ORDER — ALPRAZOLAM 0.5 MG
0.5 TABLET ORAL DAILY PRN
Qty: 30 TABLET | Refills: 0 | Status: SHIPPED | OUTPATIENT
Start: 2020-06-17 | End: 2020-07-12

## 2020-06-18 ENCOUNTER — MYC REFILL (OUTPATIENT)
Dept: INTERNAL MEDICINE | Facility: CLINIC | Age: 55
End: 2020-06-18

## 2020-06-18 DIAGNOSIS — T78.40XD ALLERGIC REACTION, SUBSEQUENT ENCOUNTER: ICD-10-CM

## 2020-06-22 RX ORDER — EPINEPHRINE 0.3 MG/.3ML
0.3 INJECTION SUBCUTANEOUS
Qty: 0.3 ML | Refills: 1 | Status: SHIPPED | OUTPATIENT
Start: 2020-06-22 | End: 2020-09-29

## 2020-06-22 NOTE — TELEPHONE ENCOUNTER
Patient requesting refills from 3/17/20 be sent to another pharmacy     Prescription approved per [facility] Refill Protocol.

## 2020-06-29 DIAGNOSIS — G62.9 NEUROPATHY: ICD-10-CM

## 2020-06-30 NOTE — TELEPHONE ENCOUNTER
Pending Prescriptions:                       Disp   Refills    gabapentin (NEURONTIN) 600 MG tablet [Phar*270 ta*0        Sig: TAKE 1 TABLET BY MOUTH 3 TIMES DAILY    Routing refill request to provider for review/approval because:  Drug not on the [facility] refill protocol

## 2020-07-01 ENCOUNTER — MYC REFILL (OUTPATIENT)
Dept: NURSING | Facility: CLINIC | Age: 55
End: 2020-07-01

## 2020-07-01 ENCOUNTER — MYC REFILL (OUTPATIENT)
Dept: INTERNAL MEDICINE | Facility: CLINIC | Age: 55
End: 2020-07-01

## 2020-07-01 DIAGNOSIS — G62.9 NEUROPATHY: ICD-10-CM

## 2020-07-01 DIAGNOSIS — I10 ESSENTIAL HYPERTENSION: ICD-10-CM

## 2020-07-01 RX ORDER — GABAPENTIN 600 MG/1
TABLET ORAL
Qty: 270 TABLET | Refills: 0 | Status: CANCELLED | OUTPATIENT
Start: 2020-07-01

## 2020-07-01 RX ORDER — GABAPENTIN 600 MG/1
TABLET ORAL
Qty: 270 TABLET | Refills: 0 | Status: SHIPPED | OUTPATIENT
Start: 2020-07-01 | End: 2020-12-02

## 2020-07-01 RX ORDER — LISINOPRIL 20 MG/1
20 TABLET ORAL DAILY
Qty: 90 TABLET | Refills: 0 | Status: SHIPPED | OUTPATIENT
Start: 2020-07-01 | End: 2020-07-17

## 2020-07-01 NOTE — TELEPHONE ENCOUNTER
"Lisinopril refill request.     Last OV on 2/17/20  Prescription approved per [facility] Refill Protocol.      Requested Prescriptions   Pending Prescriptions Disp Refills     lisinopril (ZESTRIL) 20 MG tablet 90 tablet 1     Sig: Take 1 tablet (20 mg) by mouth daily       ACE Inhibitors (Including Combos) Protocol Passed - 7/1/2020  8:58 AM        Passed - Blood pressure under 140/90 in past 12 months     BP Readings from Last 3 Encounters:   02/17/20 124/82   01/10/20 121/78   01/09/20 (!) 135/102                 Passed - Recent (12 mo) or future (30 days) visit within the authorizing provider's specialty     Patient has had an office visit with the authorizing provider or a provider within the authorizing providers department within the previous 12 mos or has a future within next 30 days. See \"Patient Info\" tab in inbasket, or \"Choose Columns\" in Meds & Orders section of the refill encounter.              Passed - Medication is active on med list        Passed - Patient is age 18 or older        Passed - No active pregnancy on record        Passed - Normal serum creatinine on file in past 12 months     Recent Labs   Lab Test 01/09/20 1909   CR 0.80       Ok to refill medication if creatinine is low          Passed - Normal serum potassium on file in past 12 months     Recent Labs   Lab Test 01/09/20 1909   POTASSIUM 3.5             Passed - No positive pregnancy test within past 12 months             "

## 2020-07-05 ENCOUNTER — MYC REFILL (OUTPATIENT)
Dept: INTERNAL MEDICINE | Facility: CLINIC | Age: 55
End: 2020-07-05

## 2020-07-05 DIAGNOSIS — F98.8 ATTENTION DEFICIT DISORDER, UNSPECIFIED HYPERACTIVITY PRESENCE: ICD-10-CM

## 2020-07-06 NOTE — TELEPHONE ENCOUNTER
Controlled Substance Refill Request for Ritalin  Problem List Complete:  No     PROVIDER TO CONSIDER COMPLETION OF PROBLEM LIST AND OVERVIEW/CONTROLLED SUBSTANCE AGREEMENT    Last Written Prescription Date:  6/9/20  Last Fill Quantity: 60,   # refills: 0    [place] [facility] [place]: 2/17/20    Future Office visit:     Controlled substance agreement:   Encounter-Level CSA - 05/16/2016:    Controlled Substance Agreement - Scan on 5/26/2016 12:16 PM: Controlled Substance Agreement-5/17/2016     Patient-Level CSA:    There are no patient-level csa.         Last Urine Drug Screen: No results found for: CDAUT, No results found for: COMDAT, No results found for: THC13, PCP13, COC13, MAMP13, OPI13, AMP13, BZO13, TCA13, MTD13, BAR13, OXY13, PPX13, BUP13     Processing:  Rx to be electronically transmitted to pharmacy by provider      [email or web address]       checked in past 3 months?  No, route to RN - unable to access for this provider

## 2020-07-07 ENCOUNTER — VIRTUAL VISIT (OUTPATIENT)
Dept: INTERNAL MEDICINE | Facility: CLINIC | Age: 55
End: 2020-07-07
Payer: COMMERCIAL

## 2020-07-07 DIAGNOSIS — F98.8 ATTENTION DEFICIT DISORDER, UNSPECIFIED HYPERACTIVITY PRESENCE: ICD-10-CM

## 2020-07-07 DIAGNOSIS — M54.2 CHRONIC NECK PAIN: Primary | ICD-10-CM

## 2020-07-07 DIAGNOSIS — I10 ESSENTIAL HYPERTENSION, BENIGN: ICD-10-CM

## 2020-07-07 DIAGNOSIS — F41.1 GAD (GENERALIZED ANXIETY DISORDER): ICD-10-CM

## 2020-07-07 DIAGNOSIS — E78.5 HYPERLIPIDEMIA LDL GOAL <130: ICD-10-CM

## 2020-07-07 DIAGNOSIS — G89.29 CHRONIC NECK PAIN: Primary | ICD-10-CM

## 2020-07-07 PROCEDURE — 99214 OFFICE O/P EST MOD 30 MIN: CPT | Mod: 95 | Performed by: INTERNAL MEDICINE

## 2020-07-07 RX ORDER — METHYLPHENIDATE HYDROCHLORIDE 20 MG/1
20 TABLET ORAL 2 TIMES DAILY
Qty: 60 TABLET | Refills: 0 | Status: SHIPPED | OUTPATIENT
Start: 2020-07-07 | End: 2020-10-29

## 2020-07-07 RX ORDER — METHYLPHENIDATE HYDROCHLORIDE 20 MG/1
20 TABLET ORAL 2 TIMES DAILY
Qty: 60 TABLET | Refills: 0 | Status: SHIPPED | OUTPATIENT
Start: 2020-07-07 | End: 2020-08-04

## 2020-07-07 ASSESSMENT — PATIENT HEALTH QUESTIONNAIRE - PHQ9
5. POOR APPETITE OR OVEREATING: SEVERAL DAYS
SUM OF ALL RESPONSES TO PHQ QUESTIONS 1-9: 6

## 2020-07-07 ASSESSMENT — ANXIETY QUESTIONNAIRES
IF YOU CHECKED OFF ANY PROBLEMS ON THIS QUESTIONNAIRE, HOW DIFFICULT HAVE THESE PROBLEMS MADE IT FOR YOU TO DO YOUR WORK, TAKE CARE OF THINGS AT HOME, OR GET ALONG WITH OTHER PEOPLE: VERY DIFFICULT
7. FEELING AFRAID AS IF SOMETHING AWFUL MIGHT HAPPEN: NOT AT ALL
3. WORRYING TOO MUCH ABOUT DIFFERENT THINGS: MORE THAN HALF THE DAYS
1. FEELING NERVOUS, ANXIOUS, OR ON EDGE: SEVERAL DAYS
6. BECOMING EASILY ANNOYED OR IRRITABLE: SEVERAL DAYS
5. BEING SO RESTLESS THAT IT IS HARD TO SIT STILL: NEARLY EVERY DAY
GAD7 TOTAL SCORE: 9
2. NOT BEING ABLE TO STOP OR CONTROL WORRYING: SEVERAL DAYS

## 2020-07-07 NOTE — PROGRESS NOTES
"Jayleen Lang is a 54 year old female who is being evaluated via a billable telephone visit.      The patient has been notified of following:     \"This telephone visit will be conducted via a call between you and your physician/provider. We have found that certain health care needs can be provided without the need for a physical exam.  This service lets us provide the care you need with a short phone conversation.  If a prescription is necessary we can send it directly to your pharmacy.  If lab work is needed we can place an order for that and you can then stop by our lab to have the test done at a later time.    Telephone visits are billed at different rates depending on your insurance coverage. During this emergency period, for some insurers they may be billed the same as an in-person visit.  Please reach out to your insurance provider with any questions.    If during the course of the call the physician/provider feels a telephone visit is not appropriate, you will not be charged for this service.\"    Patient has given verbal consent for Telephone visit?  Yes    What phone number would you like to be contacted at? [phone]    How would you like to obtain your AVS? Bernicehart    Subjective     Jayleen Lang is a 54 year old female who presents via phone visit today for the following health issues:    HPI  Hyperlipidemia Follow-Up      Are you regularly taking any medication or supplement to lower your cholesterol?      No     Are you having muscle aches or other side effects that you think could be caused by your cholesterol lowering medication?  No    Hypertension Follow-up  Has h/o HTN. on medical treatment. BP has been controlled. No side effects from medications. No CP, HA, dizziness. good compliance with medications and low salt diet.      Do you check your blood pressure regularly outside of the clinic? No     Are you following a low salt diet? Yes    Are your blood pressures ever more than 140 on the top number " (systolic) OR more   than 90 on the bottom number (diastolic), for example 140/90? No    Anxiety Follow-Up    How are you doing with your anxiety since your last visit? Worsened     Are you having other symptoms that might be associated with anxiety? No    Have you had a significant life event? Other      Are you feeling depressed? No    Do you have any concerns with your use of alcohol or other drugs? No    Social History     Tobacco Use     Smoking status: Never Smoker     Smokeless tobacco: Never Used   Substance Use Topics     Alcohol use: Yes     Comment: 2 -3  beers a week     Drug use: No     REBEKAH-7 SCORE 3/6/2019 1/6/2020 7/7/2020   Total Score - - -   Total Score 13 (moderate anxiety) - -   Total Score 13 7 9     PHQ 1/3/2018 3/6/2019 1/6/2020   PHQ-9 Total Score 3 5 7   Q9: Thoughts of better off dead/self-harm past 2 weeks Not at all Not at all Not at all           How many servings of fruits and vegetables do you eat daily?  2-3    On average, how many sweetened beverages do you drink each day (Examples: soda, juice, sweet tea, etc.  Do NOT count diet or artificially sweetened beverages)?   0    How many days per week do you exercise enough to make your heart beat faster? 3 or less    How many minutes a day do you exercise enough to make your heart beat faster? 30 - 60    How many days per week do you miss taking your medication? 0         PROBLEMS TO ADD ON...  -------------------------------------    Has h/o ADHD. On Ritalin , helps with concentration. No side effects.   Discussed preventive measures.     Patient Active Problem List   Diagnosis     Rash     Menorrhagia     Obesity     REBEKAH (generalized anxiety disorder)     Essential hypertension, benign     Allergic reaction     Hyperlipidemia LDL goal <130     Chronic neck pain     Controlled substance agreement signed     Attention deficit disorder, unspecified hyperactivity presence     Past Surgical History:   Procedure Laterality Date      ARTHROPLASTY HIP  2017    left hip replaced     ARTHROSCOPIC REPAIR POSTERIOR CRUCIATE LIGAMENT       BUNIONECTOMY Left 2018    Procedure: BUNIONECTOMY;  1.  Closing base osteotomy, first metatarsal, left foot.   2.  Akin osteotomy, left great toe. ;  Surgeon: Eliezer Parra DPM;  Location: [facility] OR      SECTION  10/1998    x2     COLONOSCOPY  06/15/2018    Dr. So [facility]     COLONOSCOPY N/A 6/15/2018    Procedure: COLONOSCOPY;  Colonoscopy ;  Surgeon: Luis Fernando So MD;  Location:  GI     Foot surgery - bone spurs  2002     LAMINECT/DISCECTOMY, CERVICAL      C6-C7 Fusion.     OSTEOTOMY FOOT Left 2018    Procedure: OSTEOTOMY FOOT;;  Surgeon: Eliezer Parra DPM;  Location:  OR       Social History     Tobacco Use     Smoking status: Never Smoker     Smokeless tobacco: Never Used   Substance Use Topics     Alcohol use: Yes     Comment: 2 -3  beers a week     Family History   Problem Relation Age of Onset     Cancer Mother         lung     Heart Disease Father         congestive heart failure     Colon Cancer No family hx of          Current Outpatient Medications   Medication Sig Dispense Refill     ALPRAZolam (XANAX) 0.5 MG tablet Take 1 tablet (0.5 mg) by mouth daily as needed for anxiety 30 tablet 0     cetirizine (ZYRTEC) 10 MG tablet TAKE ONE TABLET BY MOUTH EVERY EVENING 90 tablet 2     EPINEPHrine (ANY BX GENERIC EQUIV) 0.3 MG/0.3ML injection 2-pack Inject 0.3 mLs (0.3 mg) into the muscle once as needed for anaphylaxis 0.3 mL 1     gabapentin (NEURONTIN) 600 MG tablet TAKE 1 TABLET BY MOUTH 3 TIMES DAILY 270 tablet 0     lisinopril (ZESTRIL) 20 MG tablet Take 1 tablet (20 mg) by mouth daily 90 tablet 0     methylphenidate (RITALIN) 20 MG tablet Take 1 tablet (20 mg) by mouth 2 times daily 60 tablet 0     MULTIPLE VITAMIN PO Take 1 tablet by mouth daily         Reviewed and updated as needed this visit by Provider         Review of Systems   Constitutional, HEENT, cardiovascular,  pulmonary, gi and gu systems are negative, except as otherwise noted.       Objective   Normal speech and affect   Remainder of exam unable to be completed due to telephone visits    Diagnostic Test Results:  Labs reviewed in Epic        Assessment/Plan:  1. Attention deficit disorder, unspecified hyperactivity presence    - methylphenidate (RITALIN) 20 MG tablet; Take 1 tablet (20 mg) by mouth 2 times daily  Dispense: 60 tablet; Refill: 0    2. Chronic neck pain      3. Essential hypertension, benign    - CBC with platelets; Future  - Comprehensive metabolic panel; Future  - Lipid panel reflex to direct LDL Fasting; Future  - TSH with free T4 reflex; Future  - *UA reflex to Microscopic; Future    4. Hyperlipidemia LDL goal <130      5. REBEKAH (generalized anxiety disorder)      Assess lab   Cont treatment     Phone call duration:  8 minutes    New Castellanos MD

## 2020-07-08 ASSESSMENT — ANXIETY QUESTIONNAIRES: GAD7 TOTAL SCORE: 9

## 2020-07-12 ENCOUNTER — MYC REFILL (OUTPATIENT)
Dept: NURSING | Facility: CLINIC | Age: 55
End: 2020-07-12

## 2020-07-12 ENCOUNTER — MYC REFILL (OUTPATIENT)
Dept: INTERNAL MEDICINE | Facility: CLINIC | Age: 55
End: 2020-07-12

## 2020-07-12 DIAGNOSIS — F41.1 GAD (GENERALIZED ANXIETY DISORDER): ICD-10-CM

## 2020-07-12 DIAGNOSIS — G62.9 NEUROPATHY: ICD-10-CM

## 2020-07-12 DIAGNOSIS — T78.40XD ALLERGIC REACTION, SUBSEQUENT ENCOUNTER: ICD-10-CM

## 2020-07-14 RX ORDER — GABAPENTIN 600 MG/1
TABLET ORAL
Qty: 270 TABLET | Refills: 0 | OUTPATIENT
Start: 2020-07-14

## 2020-07-14 RX ORDER — ALPRAZOLAM 0.5 MG
0.5 TABLET ORAL DAILY PRN
Qty: 30 TABLET | Refills: 0 | Status: SHIPPED | OUTPATIENT
Start: 2020-07-14 | End: 2020-08-12

## 2020-07-14 RX ORDER — EPINEPHRINE 0.3 MG/.3ML
0.3 INJECTION SUBCUTANEOUS
Qty: 0.3 ML | Refills: 1 | OUTPATIENT
Start: 2020-07-14

## 2020-07-14 NOTE — TELEPHONE ENCOUNTER
Controlled Substance Refill Request for Alprazolam   Problem List Complete:  No     PROVIDER TO CONSIDER COMPLETION OF PROBLEM LIST AND OVERVIEW/CONTROLLED SUBSTANCE AGREEMENT    Last Written Prescription Date:  6/17/20  Last Fill Quantity: 30,   # refills: 0    Last Office Visit with [facility] [place] provider: 7/7/20    Future Office visit:     Controlled substance agreement:   Encounter-Level CSA - 05/16/2016:    Controlled Substance Agreement - Scan on 5/26/2016 12:16 PM: Controlled Substance Agreement-5/17/2016     Patient-Level CSA:    There are no patient-level csa.         Last Urine Drug Screen: No results found for: CDAUT, No results found for: COMDAT, No results found for: THC13, PCP13, COC13, MAMP13, OPI13, AMP13, BZO13, TCA13, MTD13, BAR13, OXY13, PPX13, BUP13    Writer is not authorized to check  for provider     [email or web address]

## 2020-07-17 ENCOUNTER — TELEPHONE (OUTPATIENT)
Dept: INTERNAL MEDICINE | Facility: CLINIC | Age: 55
End: 2020-07-17

## 2020-07-17 DIAGNOSIS — I10 ESSENTIAL HYPERTENSION: ICD-10-CM

## 2020-07-17 RX ORDER — LISINOPRIL 20 MG/1
20 TABLET ORAL DAILY
Qty: 4 TABLET | Refills: 0 | Status: SHIPPED | OUTPATIENT
Start: 2020-07-17 | End: 2020-08-04

## 2020-07-17 NOTE — TELEPHONE ENCOUNTER
.Lisinopril      Last Written Prescription Date:  07/01/20  Last Fill Quantity: 90,   # refills: 0  /Last Office Visit: VV 07/0  Future Office visit:       Routing refill request to provider for review/approval because:  PATIENT IS ON VACATION AND FORGOT THIS rx AT HOME.  PLEASE ROLANDO IN 4 TABLETS TO [facility] IN [place] TO HOLD HER OVER UNTIL SHE RETURNS HOME.

## 2020-07-17 NOTE — TELEPHONE ENCOUNTER
Last fill 7-1-20 #90 tabs with 0 refills.    Medication e-scribed #4 tabs to pharmacy since patient is on vacation and forgot her medication at home.    Patient informed.

## 2020-07-28 DIAGNOSIS — I10 ESSENTIAL HYPERTENSION, BENIGN: ICD-10-CM

## 2020-07-28 DIAGNOSIS — Z11.9 SCREENING EXAMINATION FOR INFECTIOUS DISEASE: ICD-10-CM

## 2020-07-28 LAB
ALBUMIN SERPL-MCNC: 3.5 G/DL (ref 3.4–5)
ALBUMIN UR-MCNC: NEGATIVE MG/DL
ALP SERPL-CCNC: 65 U/L (ref 40–150)
ALT SERPL W P-5'-P-CCNC: 52 U/L (ref 0–50)
ANION GAP SERPL CALCULATED.3IONS-SCNC: 5 MMOL/L (ref 3–14)
APPEARANCE UR: CLEAR
AST SERPL W P-5'-P-CCNC: 31 U/L (ref 0–45)
BILIRUB SERPL-MCNC: 0.3 MG/DL (ref 0.2–1.3)
BILIRUB UR QL STRIP: NEGATIVE
BUN SERPL-MCNC: 14 MG/DL (ref 7–30)
CALCIUM SERPL-MCNC: 8.6 MG/DL (ref 8.5–10.1)
CHLORIDE SERPL-SCNC: 109 MMOL/L (ref 94–109)
CHOLEST SERPL-MCNC: 191 MG/DL
CO2 SERPL-SCNC: 26 MMOL/L (ref 20–32)
COLOR UR AUTO: YELLOW
CREAT SERPL-MCNC: 0.94 MG/DL (ref 0.52–1.04)
ERYTHROCYTE [DISTWIDTH] IN BLOOD BY AUTOMATED COUNT: 14.4 % (ref 10–15)
GFR SERPL CREATININE-BSD FRML MDRD: 69 ML/MIN/{1.73_M2}
GLUCOSE SERPL-MCNC: 80 MG/DL (ref 70–99)
GLUCOSE UR STRIP-MCNC: NEGATIVE MG/DL
HCT VFR BLD AUTO: 43 % (ref 35–47)
HDLC SERPL-MCNC: 75 MG/DL
HGB BLD-MCNC: 14 G/DL (ref 11.7–15.7)
HGB UR QL STRIP: NEGATIVE
KETONES UR STRIP-MCNC: NEGATIVE MG/DL
LDLC SERPL CALC-MCNC: 109 MG/DL
LEUKOCYTE ESTERASE UR QL STRIP: NEGATIVE
MCH RBC QN AUTO: 29.9 PG (ref 26.5–33)
MCHC RBC AUTO-ENTMCNC: 32.6 G/DL (ref 31.5–36.5)
MCV RBC AUTO: 92 FL (ref 78–100)
MEV IGG SER QL IA: 0.6 AI (ref 0–0.8)
MUV IGG SER QL IA: 1.6 AI (ref 0–0.8)
NITRATE UR QL: NEGATIVE
NONHDLC SERPL-MCNC: 116 MG/DL
PH UR STRIP: 6.5 PH (ref 5–7)
PLATELET # BLD AUTO: 260 10E9/L (ref 150–450)
POTASSIUM SERPL-SCNC: 3.9 MMOL/L (ref 3.4–5.3)
PROT SERPL-MCNC: 6.7 G/DL (ref 6.8–8.8)
RBC # BLD AUTO: 4.68 10E12/L (ref 3.8–5.2)
SODIUM SERPL-SCNC: 140 MMOL/L (ref 133–144)
SOURCE: NORMAL
SP GR UR STRIP: >1.03 (ref 1–1.03)
TRIGL SERPL-MCNC: 36 MG/DL
TSH SERPL DL<=0.005 MIU/L-ACNC: 0.94 MU/L (ref 0.4–4)
UROBILINOGEN UR STRIP-ACNC: 0.2 EU/DL (ref 0.2–1)
WBC # BLD AUTO: 4 10E9/L (ref 4–11)

## 2020-07-28 PROCEDURE — 36415 COLL VENOUS BLD VENIPUNCTURE: CPT | Performed by: INTERNAL MEDICINE

## 2020-07-28 PROCEDURE — 86735 MUMPS ANTIBODY: CPT | Performed by: INTERNAL MEDICINE

## 2020-07-28 PROCEDURE — 85027 COMPLETE CBC AUTOMATED: CPT | Performed by: INTERNAL MEDICINE

## 2020-07-28 PROCEDURE — 86765 RUBEOLA ANTIBODY: CPT | Performed by: INTERNAL MEDICINE

## 2020-07-28 PROCEDURE — 80053 COMPREHEN METABOLIC PANEL: CPT | Performed by: INTERNAL MEDICINE

## 2020-07-28 PROCEDURE — 84443 ASSAY THYROID STIM HORMONE: CPT | Performed by: INTERNAL MEDICINE

## 2020-07-28 PROCEDURE — 80061 LIPID PANEL: CPT | Performed by: INTERNAL MEDICINE

## 2020-07-28 PROCEDURE — 86762 RUBELLA ANTIBODY: CPT | Performed by: INTERNAL MEDICINE

## 2020-07-28 PROCEDURE — 81003 URINALYSIS AUTO W/O SCOPE: CPT | Performed by: INTERNAL MEDICINE

## 2020-07-29 LAB — RUBV IGG SERPL IA-ACNC: 40 IU/ML

## 2020-08-10 DIAGNOSIS — F41.1 GAD (GENERALIZED ANXIETY DISORDER): ICD-10-CM

## 2020-08-11 ENCOUNTER — MYC REFILL (OUTPATIENT)
Dept: NURSING | Facility: CLINIC | Age: 55
End: 2020-08-11

## 2020-08-11 DIAGNOSIS — F41.1 GAD (GENERALIZED ANXIETY DISORDER): ICD-10-CM

## 2020-08-11 RX ORDER — ALPRAZOLAM 0.5 MG
0.5 TABLET ORAL DAILY PRN
Qty: 30 TABLET | Refills: 0 | Status: CANCELLED | OUTPATIENT
Start: 2020-08-11

## 2020-08-11 NOTE — TELEPHONE ENCOUNTER
Alprazolam refill request    Last OV on 7/7/20    Last refill on 7/14/20 for #30 with no refills.     Routing refill request to provider for review/approval because:  Drug not on the [facility] refill protocol     CSA signed on 5/2016.     [place]  reviewed on 6/9/20 no concerns.

## 2020-08-12 RX ORDER — ALPRAZOLAM 0.5 MG
0.5 TABLET ORAL DAILY PRN
Qty: 30 TABLET | Refills: 0 | Status: SHIPPED | OUTPATIENT
Start: 2020-08-12 | End: 2020-09-05

## 2020-08-31 DIAGNOSIS — F98.8 ATTENTION DEFICIT DISORDER, UNSPECIFIED HYPERACTIVITY PRESENCE: ICD-10-CM

## 2020-09-02 RX ORDER — METHYLPHENIDATE HYDROCHLORIDE 20 MG/1
20 TABLET ORAL 2 TIMES DAILY
Qty: 60 TABLET | Refills: 0 | Status: SHIPPED | OUTPATIENT
Start: 2020-09-02 | End: 2020-09-29

## 2020-09-02 NOTE — TELEPHONE ENCOUNTER
Ritalin refill request.     Last refill on 8/5/20 for #60     Last VV on 7/7/20    Routing refill request to provider for review/approval because:  Drug not on the [facility] refill protocol     [place]  reviewed on 8/5/20 no concerns.

## 2020-09-20 ENCOUNTER — NURSE TRIAGE (OUTPATIENT)
Dept: NURSING | Facility: CLINIC | Age: 55
End: 2020-09-20

## 2020-09-20 NOTE — TELEPHONE ENCOUNTER
Headache x2 days. States she is exhausted.   Patient states she had exposure to son who was dx with COVID on 8/28/20     Wondering about testing for COVID.  RN discussed that in order to be tested for COVID through [facility] that she would need an appointment and provider order. She stated understanding.  RN also recommended [facility] website for other testing sites.      Patient declined triage, states she just wants to be tested and feels like she has COVID.  RN recommended calling back if she decides she wants to be triaged or if she has any new or worsening sx.      Patient agreed to plan.     Carmela Pearson RN 09/20/20 11:44 AM  [facility] Nurse Advisor

## 2020-09-20 NOTE — TELEPHONE ENCOUNTER
Additional Information    General information question, no triage required and triager able to answer question     Patient declined triage.    Negative: Health Information question, no triage required and triager able to answer question    Negative: Requesting regular office appointment    Negative: [1] Caller requesting NON-URGENT health information AND [2] PCP's office is the best resource    Negative: RN needs further essential information from caller in order to complete triage    Negative: [1] Caller is not with the adult (patient) AND [2] reporting urgent symptoms    Negative: Lab result questions    Negative: Medication questions    Negative: Caller can't be reached by phone    Negative: Caller has already spoken to PCP or another triager    Protocols used: INFORMATION ONLY CALL-A-

## 2020-09-29 ENCOUNTER — MYC REFILL (OUTPATIENT)
Dept: INTERNAL MEDICINE | Facility: CLINIC | Age: 55
End: 2020-09-29

## 2020-09-29 DIAGNOSIS — T78.40XD ALLERGIC REACTION, SUBSEQUENT ENCOUNTER: ICD-10-CM

## 2020-09-29 DIAGNOSIS — F98.8 ATTENTION DEFICIT DISORDER, UNSPECIFIED HYPERACTIVITY PRESENCE: ICD-10-CM

## 2020-09-30 RX ORDER — METHYLPHENIDATE HYDROCHLORIDE 20 MG/1
20 TABLET ORAL 2 TIMES DAILY
Qty: 60 TABLET | Refills: 0 | Status: SHIPPED | OUTPATIENT
Start: 2020-09-30 | End: 2020-10-28

## 2020-09-30 RX ORDER — EPINEPHRINE 0.3 MG/.3ML
0.3 INJECTION SUBCUTANEOUS
Qty: 0.3 ML | Refills: 1 | Status: SHIPPED | OUTPATIENT
Start: 2020-09-30 | End: 2021-04-05

## 2020-09-30 NOTE — TELEPHONE ENCOUNTER
Pending Prescriptions:                       Disp   Refills    EPINEPHrine (ANY BX GENERIC EQUIV) 0.3 MG*0.3 mL 1            Sig: Inject 0.3 mLs (0.3 mg) into the muscle once as           needed for anaphylaxis    Prescription approved per [facility] Refill Protocol.

## 2020-09-30 NOTE — TELEPHONE ENCOUNTER
Controlled Substance Refill Request for Ritalin 20mg  Problem List Complete:  No     PROVIDER TO CONSIDER COMPLETION OF PROBLEM LIST AND OVERVIEW/CONTROLLED SUBSTANCE AGREEMENT    Last Written Prescription Date:  9-2-20  Last Fill Quantity: 60,   # refills: 0    THE MOST RECENT OFFICE VISIT MUST BE WITHIN THE PAST 3 MONTHS. AT LEAST ONE FACE TO FACE VISIT MUST OCCUR EVERY 6 MONTHS. ADDITIONAL VISITS CAN BE VIRTUAL.  (THIS STATEMENT SHOULD BE DELETED.)    Last Office Visit with [facility] [place] care provider: 7-7-20 virtual    Future Office visit:     Controlled substance agreement:   Encounter-Level CSA - 05/16/2016:    Controlled Substance Agreement - Scan on 5/26/2016 12:16 PM: Controlled Substance Agreement-5/17/2016     Patient-Level CSA:    There are no patient-level csa.         Last Urine Drug Screen: No results found for: CDAUT, No results found for: COMDAT, No results found for: THC13, PCP13, COC13, MAMP13, OPI13, AMP13, BZO13, TCA13, MTD13, BAR13, OXY13, PPX13, BUP13     Processing:  Rx to be electronically transmitted to pharmacy by provider      [email or web address]       checked in past 3 months? Access not granted by provider.    Please advise, thanks.

## 2020-10-08 ENCOUNTER — MYC REFILL (OUTPATIENT)
Dept: NURSING | Facility: CLINIC | Age: 55
End: 2020-10-08

## 2020-10-08 DIAGNOSIS — F41.1 GAD (GENERALIZED ANXIETY DISORDER): ICD-10-CM

## 2020-10-08 RX ORDER — ALPRAZOLAM 0.5 MG
0.5 TABLET ORAL DAILY PRN
Qty: 30 TABLET | Refills: 0 | Status: CANCELLED | OUTPATIENT
Start: 2020-10-08

## 2020-10-09 RX ORDER — ALPRAZOLAM 0.5 MG
0.5 TABLET ORAL DAILY PRN
Qty: 30 TABLET | Refills: 0 | Status: SHIPPED | OUTPATIENT
Start: 2020-10-09 | End: 2020-11-02

## 2020-10-28 ENCOUNTER — MYC REFILL (OUTPATIENT)
Dept: INTERNAL MEDICINE | Facility: CLINIC | Age: 55
End: 2020-10-28

## 2020-10-28 DIAGNOSIS — F98.8 ATTENTION DEFICIT DISORDER, UNSPECIFIED HYPERACTIVITY PRESENCE: ICD-10-CM

## 2020-10-29 RX ORDER — METHYLPHENIDATE HYDROCHLORIDE 20 MG/1
20 TABLET ORAL 2 TIMES DAILY
Qty: 60 TABLET | Refills: 0 | Status: SHIPPED | OUTPATIENT
Start: 2020-10-29 | End: 2020-11-30

## 2020-10-29 NOTE — TELEPHONE ENCOUNTER
Ritalin refill request    Last refill on 9/30/20 for #60     Last VV on 7/7/20     Routing refill request to provider for review/approval because:  Drug not on the [facility] refill protocol     Encounter-Level CSA - 05/16/2016:    Controlled Substance Agreement - Scan on 5/26/2016 12:16 PM: Controlled Substance Agreement-5/17/2016     Patient-Level CSA:    There are no patient-level csa.       [place]  last reviewed on 8/5/20, no concerns.

## 2020-11-02 ENCOUNTER — MYC REFILL (OUTPATIENT)
Dept: NURSING | Facility: CLINIC | Age: 55
End: 2020-11-02

## 2020-11-02 DIAGNOSIS — F41.1 GAD (GENERALIZED ANXIETY DISORDER): ICD-10-CM

## 2020-11-04 NOTE — TELEPHONE ENCOUNTER
This was signed on 10/9/20. November 8th is on Sunday. That is 30 days.   OK to fill on Friday, 11/6? Will route to Dr Castellanos then.

## 2020-11-06 RX ORDER — ALPRAZOLAM 0.5 MG
0.5 TABLET ORAL DAILY PRN
Qty: 30 TABLET | Refills: 0 | Status: SHIPPED | OUTPATIENT
Start: 2020-11-06 | End: 2020-12-03

## 2020-11-30 ENCOUNTER — MYC REFILL (OUTPATIENT)
Dept: INTERNAL MEDICINE | Facility: CLINIC | Age: 55
End: 2020-11-30

## 2020-11-30 DIAGNOSIS — I10 ESSENTIAL HYPERTENSION: ICD-10-CM

## 2020-11-30 DIAGNOSIS — F98.8 ATTENTION DEFICIT DISORDER, UNSPECIFIED HYPERACTIVITY PRESENCE: ICD-10-CM

## 2020-12-01 RX ORDER — METHYLPHENIDATE HYDROCHLORIDE 20 MG/1
20 TABLET ORAL 2 TIMES DAILY
Qty: 60 TABLET | Refills: 0 | Status: SHIPPED | OUTPATIENT
Start: 2020-12-01 | End: 2020-12-26

## 2020-12-01 RX ORDER — LISINOPRIL 20 MG/1
20 TABLET ORAL DAILY
Qty: 90 TABLET | Refills: 0 | Status: SHIPPED | OUTPATIENT
Start: 2020-12-01 | End: 2021-02-26

## 2020-12-01 NOTE — TELEPHONE ENCOUNTER
Ritalin refill request     Last refill on 10/29/20 for #60    Last VV on 7/7/20    Routing refill request to provider for review/approval because:  Drug not on the [facility] refill protocol     Encounter-Level CSA - 05/16/2016:    Controlled Substance Agreement - Scan on 5/26/2016 12:16 PM: Controlled Substance Agreement-5/17/2016     Patient-Level CSA:    There are no patient-level csa.

## 2020-12-02 DIAGNOSIS — G62.9 NEUROPATHY: ICD-10-CM

## 2020-12-02 RX ORDER — GABAPENTIN 600 MG/1
TABLET ORAL
Qty: 270 TABLET | Refills: 0 | Status: SHIPPED | OUTPATIENT
Start: 2020-12-02 | End: 2021-02-26

## 2020-12-03 ENCOUNTER — MYC REFILL (OUTPATIENT)
Dept: NURSING | Facility: CLINIC | Age: 55
End: 2020-12-03

## 2020-12-03 DIAGNOSIS — G62.9 NEUROPATHY: ICD-10-CM

## 2020-12-03 DIAGNOSIS — I10 ESSENTIAL HYPERTENSION: ICD-10-CM

## 2020-12-03 DIAGNOSIS — F41.1 GAD (GENERALIZED ANXIETY DISORDER): ICD-10-CM

## 2020-12-03 RX ORDER — GABAPENTIN 600 MG/1
600 TABLET ORAL 3 TIMES DAILY
Qty: 270 TABLET | Refills: 0 | Status: CANCELLED | OUTPATIENT
Start: 2020-12-03

## 2020-12-03 RX ORDER — LISINOPRIL 20 MG/1
20 TABLET ORAL DAILY
Qty: 90 TABLET | Refills: 0 | Status: CANCELLED | OUTPATIENT
Start: 2020-12-03

## 2020-12-04 RX ORDER — ALPRAZOLAM 0.5 MG
0.5 TABLET ORAL DAILY PRN
Qty: 30 TABLET | Refills: 0 | Status: SHIPPED | OUTPATIENT
Start: 2020-12-04 | End: 2020-12-28

## 2020-12-04 NOTE — TELEPHONE ENCOUNTER
Alprazolam refill request.     Last refill on 11/6/20 for #30     Last OV on 7/7/20     Routing refill request to provider for review/approval because:  Drug not on the [facility] refill protocol     Encounter-Level CSA - 05/16/2016:    Controlled Substance Agreement - Scan on 5/26/2016 12:16 PM: Controlled Substance Agreement-5/17/2016     Patient-Level CSA:    There are no patient-level csa.

## 2020-12-26 ENCOUNTER — MYC REFILL (OUTPATIENT)
Dept: INTERNAL MEDICINE | Facility: CLINIC | Age: 55
End: 2020-12-26

## 2020-12-26 DIAGNOSIS — F98.8 ATTENTION DEFICIT DISORDER, UNSPECIFIED HYPERACTIVITY PRESENCE: ICD-10-CM

## 2020-12-28 ENCOUNTER — MYC REFILL (OUTPATIENT)
Dept: NURSING | Facility: CLINIC | Age: 55
End: 2020-12-28

## 2020-12-28 DIAGNOSIS — F41.1 GAD (GENERALIZED ANXIETY DISORDER): ICD-10-CM

## 2020-12-28 RX ORDER — METHYLPHENIDATE HYDROCHLORIDE 20 MG/1
20 TABLET ORAL 2 TIMES DAILY
Qty: 60 TABLET | Refills: 0 | Status: SHIPPED | OUTPATIENT
Start: 2020-12-28 | End: 2021-01-24

## 2020-12-28 NOTE — TELEPHONE ENCOUNTER
Ritalin refill request.     Last refill on 12/1/20 for #60.     Last OV on 7/7/20    Encounter-Level CSA - 05/16/2016:    Controlled Substance Agreement - Scan on 5/26/2016 12:16 PM: Controlled Substance Agreement-5/17/2016     Patient-Level CSA:    There are no patient-level csa.

## 2020-12-30 NOTE — TELEPHONE ENCOUNTER
Alprazolam refill request     Last refill on 12/4/20 for #30     Last VV on 7/7/20     Routing refill request to provider for review/approval because:  Drug not on the [facility] refill protocol

## 2020-12-31 RX ORDER — ALPRAZOLAM 0.5 MG
0.5 TABLET ORAL DAILY PRN
Qty: 30 TABLET | Refills: 0 | Status: SHIPPED | OUTPATIENT
Start: 2020-12-31 | End: 2021-01-30

## 2021-01-12 ENCOUNTER — VIRTUAL VISIT (OUTPATIENT)
Dept: INTERNAL MEDICINE | Facility: CLINIC | Age: 56
End: 2021-01-12
Payer: COMMERCIAL

## 2021-01-12 DIAGNOSIS — I10 ESSENTIAL HYPERTENSION, BENIGN: Primary | ICD-10-CM

## 2021-01-12 DIAGNOSIS — F98.8 ATTENTION DEFICIT DISORDER, UNSPECIFIED HYPERACTIVITY PRESENCE: ICD-10-CM

## 2021-01-12 DIAGNOSIS — R79.89 LFT ELEVATION: ICD-10-CM

## 2021-01-12 DIAGNOSIS — F41.1 GAD (GENERALIZED ANXIETY DISORDER): ICD-10-CM

## 2021-01-12 PROCEDURE — 99214 OFFICE O/P EST MOD 30 MIN: CPT | Mod: GT | Performed by: INTERNAL MEDICINE

## 2021-01-12 NOTE — PROGRESS NOTES
Jayleen is a 55 year old who is being evaluated via a billable video visit.      How would you like to obtain your AVS? MyChart  If the video visit is dropped, the invitation should be resent by: Text to cell phone: [phone]- DOX  Will anyone else be joining your video visit? No    Video Start Time: 5:18 PM  Assessment & Plan     Essential hypertension, benign  Controlled on Lisinopril, continue treatment     REBEKAH (generalized anxiety disorder)  On PRN Xanax, good results, no side effects     Attention deficit disorder, unspecified hyperactivity presence  Continue Ritalin, monitor lab     LFT elevation    - AST; Future  - ALT; Future    See Patient Instructions    Return in about 6 months (around 7/12/2021) for Physical Exam.    New Castellanos MD  [facility]     Jayleen is a 55 year old who presents to clinic today for the following health issues     HPI   Chief Complaint   Patient presents with     Recheck Medication     F/U on BP, ADD and Neuropathy           Hypertension Follow-up  Has h/o HTN. on medical treatment. BP has been controlled. No side effects from medications. No CP, HA, dizziness. good compliance with medications and low salt diet.      Do you check your blood pressure regularly outside of the clinic? No     Are you following a low salt diet? Yes    Are your blood pressures ever more than 140 on the top number (systolic) OR more   than 90 on the bottom number (diastolic), for example 140/90? N/A      Has h/o anxiety, on PRN Xanax. No side effects from treatment, no increased use.   Has h/o ADHD. On Ritalin. 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 No side effects.         Review of Systems   Constitutional, HEENT, cardiovascular, pulmonary, gi and gu systems are negative, except as otherwise noted.      Objective           Vitals:  No vitals were obtained today due to virtual visit.    Physical Exam   GENERAL: Healthy, alert and no  distress  EYES: Eyes grossly normal to inspection.  No discharge or erythema, or obvious scleral/conjunctival abnormalities.  RESP: No audible wheeze, cough, or visible cyanosis.  No visible retractions or increased work of breathing.    SKIN: Visible skin clear. No significant rash, abnormal pigmentation or lesions.  NEURO: Cranial nerves grossly intact.  Mentation and speech appropriate for age.  PSYCH: Mentation appears normal, affect normal/bright, judgement and insight intact, normal speech and appearance well-groomed.                Video-Visit Details    Type of service:  Video Visit    Video End Time:5:30 PM    Originating Location (pt. Location): Home    Distant Location (provider location):  [facility]     Platform used for Video Visit: BigBad

## 2021-01-15 ENCOUNTER — HEALTH MAINTENANCE LETTER (OUTPATIENT)
Age: 56
End: 2021-01-15

## 2021-01-18 ENCOUNTER — TELEPHONE (OUTPATIENT)
Dept: SCHEDULING | Facility: CLINIC | Age: 56
End: 2021-01-18

## 2021-01-18 NOTE — TELEPHONE ENCOUNTER
Reason for Call:  Other - Mammo Order    Detailed comments: Pt called and would like to schedule a mammo. Please place order and contact pt when ready.    Phone Number Patient can be reached at: Cell number on file:    Telephone Information:   Mobile [phone]       Best Time: Anytime    Can we leave a detailed message on this number? YES    Call taken on 1/18/2021 at 2:22 PM by Laury Mendoza

## 2021-01-19 ENCOUNTER — OFFICE VISIT (OUTPATIENT)
Dept: FAMILY MEDICINE | Facility: CLINIC | Age: 56
End: 2021-01-19
Payer: COMMERCIAL

## 2021-01-19 VITALS
SYSTOLIC BLOOD PRESSURE: 124 MMHG | TEMPERATURE: 98.3 F | WEIGHT: 157.6 LBS | DIASTOLIC BLOOD PRESSURE: 76 MMHG | BODY MASS INDEX: 26.23 KG/M2 | OXYGEN SATURATION: 99 % | RESPIRATION RATE: 16 BRPM | HEART RATE: 91 BPM

## 2021-01-19 DIAGNOSIS — R07.0 THROAT PAIN: Primary | ICD-10-CM

## 2021-01-19 LAB
DEPRECATED S PYO AG THROAT QL EIA: NEGATIVE
SPECIMEN SOURCE: NORMAL

## 2021-01-19 PROCEDURE — 87651 STREP A DNA AMP PROBE: CPT | Performed by: FAMILY MEDICINE

## 2021-01-19 PROCEDURE — 99213 OFFICE O/P EST LOW 20 MIN: CPT | Performed by: FAMILY MEDICINE

## 2021-01-19 PROCEDURE — 99N1174 PR STATISTIC STREP A RAPID: Performed by: FAMILY MEDICINE

## 2021-01-19 NOTE — PROGRESS NOTES
Assessment & Plan     Throat pain  - Streptococcus A Rapid Scr w Reflx to PCR  - Group A Streptococcus PCR Throat Swab    - rapid strep return back negative .  - Patient denies any fever , nasal congestion or body ache .  - recommend supportive care .    - recommend to contact back if symptoms fail to improve .      Return in about 1 year (around 1/19/2022) for Routine preventive.    Rika Pathak MD  [facility] TAMERA Clemens is a 55 year old who presents to clinic today for the following health issues     HPI       Concern - sore throat  Onset: 24 hours  Description: not much pain  Intensity: mild  Progression of Symptoms:  same  Accompanying Signs & Symptoms: none  Previous history of similar problem: history of strep  Precipitating factors:        Worsened by: na  Alleviating factors:        Improved by: na  Therapies tried and outcome:  none         Review of Systems   Constitutional: Negative for fatigue, fever and unexpected weight change.   HENT: Positive for sore throat. Negative for sinus pressure and sinus pain.    Respiratory: Negative for cough and shortness of breath.    Gastrointestinal: Negative for abdominal pain, constipation and diarrhea.            Objective    /76   Pulse 91   Temp 98.3  F (36.8  C) (Tympanic)   Resp 16   Wt 71.5 kg (157 lb 9.6 oz)   LMP  (LMP Unknown)   SpO2 99%   BMI 26.23 kg/m    Body mass index is 26.23 kg/m .  Physical Exam  Vitals signs and nursing note reviewed.   Constitutional:       Appearance: Normal appearance.   HENT:      Left Ear: Tympanic membrane normal.      Nose: Nose normal. No congestion.      Mouth/Throat:      Pharynx: No oropharyngeal exudate or posterior oropharyngeal erythema.   Eyes:      Extraocular Movements: Extraocular movements intact.      Pupils: Pupils are equal, round, and reactive to light.   Neck:      Musculoskeletal: Normal range of motion.   Neurological:      Mental Status: She is alert.

## 2021-01-20 LAB
SPECIMEN SOURCE: NORMAL
STREP GROUP A PCR: NOT DETECTED

## 2021-01-20 ASSESSMENT — ENCOUNTER SYMPTOMS
DIARRHEA: 0
FATIGUE: 0
SINUS PRESSURE: 0
SHORTNESS OF BREATH: 0
FEVER: 0
SORE THROAT: 1
SINUS PAIN: 0
COUGH: 0
UNEXPECTED WEIGHT CHANGE: 0
ABDOMINAL PAIN: 0
CONSTIPATION: 0

## 2021-01-24 ENCOUNTER — MYC REFILL (OUTPATIENT)
Dept: INTERNAL MEDICINE | Facility: CLINIC | Age: 56
End: 2021-01-24

## 2021-01-24 DIAGNOSIS — F98.8 ATTENTION DEFICIT DISORDER, UNSPECIFIED HYPERACTIVITY PRESENCE: ICD-10-CM

## 2021-01-26 RX ORDER — METHYLPHENIDATE HYDROCHLORIDE 20 MG/1
20 TABLET ORAL 2 TIMES DAILY
Qty: 60 TABLET | Refills: 0 | Status: SHIPPED | OUTPATIENT
Start: 2021-01-26 | End: 2021-02-22

## 2021-01-26 NOTE — TELEPHONE ENCOUNTER
Controlled Substance Refill Request for Ritalin  Problem List Complete:  No     PROVIDER TO CONSIDER COMPLETION OF PROBLEM LIST AND OVERVIEW/CONTROLLED SUBSTANCE AGREEMENT    Last Written Prescription Date:  12/28/20  Last Fill Quantity: 60,   # refills: 0    THE MOST RECENT OFFICE VISIT MUST BE WITHIN THE PAST 3 MONTHS. AT LEAST ONE FACE TO FACE VISIT MUST OCCUR EVERY 6 MONTHS. ADDITIONAL VISITS CAN BE VIRTUAL.  (THIS STATEMENT SHOULD BE DELETED.)    [place] [facility] [place]: 1/12/21    Future Office visit:   Next 5 appointments (look out 90 days)    Feb 12, 2021  9:15 AM  SHORT with SPENCER Mccartney [facility] [address]  [address]  [facility] [id number] [phone]          Controlled substance agreement:   Encounter-Level CSA - 05/16/2016:    Controlled Substance Agreement - Scan on 5/26/2016 12:16 PM: Controlled Substance Agreement-5/17/2016     Patient-Level CSA:    There are no patient-level csa.         Last Urine Drug Screen: No results found for: CDAUT, No results found for: COMDAT, No results found for: THC13, PCP13, COC13, MAMP13, OPI13, AMP13, BZO13, TCA13, MTD13, BAR13, OXY13, PPX13, BUP13     Processing:  Rx to be electronically transmitted to pharmacy by provider      [email or web address]       checked in past 3 months?  Yes 1/26/21  No concerns.    Please advise, thanks.

## 2021-01-30 ENCOUNTER — MYC REFILL (OUTPATIENT)
Dept: NURSING | Facility: CLINIC | Age: 56
End: 2021-01-30

## 2021-01-30 DIAGNOSIS — F41.1 GAD (GENERALIZED ANXIETY DISORDER): ICD-10-CM

## 2021-02-01 RX ORDER — ALPRAZOLAM 0.5 MG
0.5 TABLET ORAL DAILY PRN
Qty: 30 TABLET | Refills: 0 | Status: SHIPPED | OUTPATIENT
Start: 2021-02-01 | End: 2021-02-26

## 2021-02-01 NOTE — TELEPHONE ENCOUNTER
Controlled Substance Refill Request for Alprazolam   Problem List Complete:  No     PROVIDER TO CONSIDER COMPLETION OF PROBLEM LIST AND OVERVIEW/CONTROLLED SUBSTANCE AGREEMENT    Last Written Prescription Date:  12/31/20  Last Fill Quantity: 30,   # refills: 0    Last [facility] Visit with [facility] [place]: 1/12/21    Future Office visit:   Next 5 appointments (look out 90 days)    Feb 12, 2021  9:15 AM  SHORT with SPENCER Mccartney [facility] [address]  [address]  [facility] [address]  [phone]          Controlled substance agreement:   Encounter-Level CSA - 05/16/2016:    Controlled Substance Agreement - Scan on 5/26/2016 12:16 PM: Controlled Substance Agreement-5/17/2016     Patient-Level CSA:    There are no patient-level csa.         Last Urine Drug Screen: No results found for: CDAUT, No results found for: COMDAT, No results found for: THC13, PCP13, COC13, MAMP13, OPI13, AMP13, BZO13, TCA13, MTD13, BAR13, OXY13, PPX13, BUP13      [email or web address]    Writer is not authorized to check  for provider

## 2021-02-22 ENCOUNTER — MYC REFILL (OUTPATIENT)
Dept: INTERNAL MEDICINE | Facility: CLINIC | Age: 56
End: 2021-02-22

## 2021-02-22 DIAGNOSIS — F98.8 ATTENTION DEFICIT DISORDER, UNSPECIFIED HYPERACTIVITY PRESENCE: ICD-10-CM

## 2021-02-24 RX ORDER — METHYLPHENIDATE HYDROCHLORIDE 20 MG/1
20 TABLET ORAL 2 TIMES DAILY
Qty: 60 TABLET | Refills: 0 | Status: SHIPPED | OUTPATIENT
Start: 2021-02-24 | End: 2021-03-23

## 2021-02-24 NOTE — TELEPHONE ENCOUNTER
Ritalin refill request.     Last refill on 1/26/21 for #60     Last OV on 1/12/21    Routing refill request to provider for review/approval because:  Drug not on the [facility] refill protocol     Encounter-Level CSA - 05/16/2016:    Controlled Substance Agreement - Scan on 5/26/2016 12:16 PM: Controlled Substance Agreement-5/17/2016     Patient-Level CSA:    There are no patient-level csa.

## 2021-02-26 ENCOUNTER — MYC REFILL (OUTPATIENT)
Dept: INTERNAL MEDICINE | Facility: CLINIC | Age: 56
End: 2021-02-26

## 2021-02-26 ENCOUNTER — MYC REFILL (OUTPATIENT)
Dept: NURSING | Facility: CLINIC | Age: 56
End: 2021-02-26

## 2021-02-26 DIAGNOSIS — G62.9 NEUROPATHY: ICD-10-CM

## 2021-02-26 DIAGNOSIS — I10 ESSENTIAL HYPERTENSION: ICD-10-CM

## 2021-02-26 DIAGNOSIS — F41.1 GAD (GENERALIZED ANXIETY DISORDER): ICD-10-CM

## 2021-02-26 RX ORDER — ALPRAZOLAM 0.5 MG
0.5 TABLET ORAL DAILY PRN
Qty: 30 TABLET | Refills: 0 | Status: SHIPPED | OUTPATIENT
Start: 2021-02-26 | End: 2021-03-23

## 2021-02-26 RX ORDER — LISINOPRIL 20 MG/1
20 TABLET ORAL DAILY
Qty: 90 TABLET | Refills: 0 | Status: SHIPPED | OUTPATIENT
Start: 2021-02-26 | End: 2021-08-08

## 2021-02-26 RX ORDER — GABAPENTIN 600 MG/1
600 TABLET ORAL 3 TIMES DAILY
Qty: 270 TABLET | Refills: 0 | Status: SHIPPED | OUTPATIENT
Start: 2021-02-26 | End: 2021-08-08

## 2021-02-26 NOTE — TELEPHONE ENCOUNTER
Pending Prescriptions:                       Disp   Refills    lisinopril (ZESTRIL) 20 MG tablet         90 tab*0            Sig: Take 1 tablet (20 mg) by mouth daily    Prescription approved per [facility] Refill Protocol.

## 2021-02-26 NOTE — TELEPHONE ENCOUNTER
Pending Prescriptions:                       Disp   Refills    gabapentin (NEURONTIN) 600 MG tablet       270 ta*0        Sig: Take 1 tablet (600 mg) by mouth 3 times daily    Routing refill request to provider for review/approval because:  Drug not on the [facility] refill protocol

## 2021-02-26 NOTE — TELEPHONE ENCOUNTER
Controlled Substance Refill Request for Alprazolam  Problem List Complete:  No     PROVIDER TO CONSIDER COMPLETION OF PROBLEM LIST AND OVERVIEW/CONTROLLED SUBSTANCE AGREEMENT    Last Written Prescription Date:  2/1/21  Last Fill Quantity: 30,   # refills: 0    Last Office Visit with [facility] primary care provider: 1/12/21    Future Office visit:     Controlled substance agreement:   Encounter-Level CSA - 05/16/2016:    Controlled Substance Agreement - Scan on 5/26/2016 12:16 PM: Controlled Substance Agreement-5/17/2016     Patient-Level CSA:    There are no patient-level csa.         Last Urine Drug Screen: No results found for: CDAUT, No results found for: COMDAT, No results found for: THC13, PCP13, COC13, MAMP13, OPI13, AMP13, BZO13, TCA13, MTD13, BAR13, OXY13, PPX13, BUP13       [email or web address]    Writer is not authorized to check  for provider

## 2021-03-03 ENCOUNTER — TELEPHONE (OUTPATIENT)
Dept: INTERNAL MEDICINE | Facility: CLINIC | Age: 56
End: 2021-03-03

## 2021-03-03 NOTE — TELEPHONE ENCOUNTER
Patient is on her way to get the covid vaccine and she has questions about possible reactions she may have. Please call her ASAP to discuss. [phone] (home)

## 2021-03-03 NOTE — TELEPHONE ENCOUNTER
Call to pt and left detailed message that she does not need to do the Vaccine if does not feel comfortable with getting it.

## 2021-03-03 NOTE — TELEPHONE ENCOUNTER
Pt states she would like a letter from Dr Castellanos stating she is declining to get the COVID shot. Due to her allergies and past reactions she has had.     She states she discussed with Dr Castellanos in the past regarding having a reaction when she was younger child, to shots.   She is worried about the Ingredients of the vaccine and she Carries an epi pen, due to her allergies currently.     She would like the letter emailed once completed and call back that it is done.

## 2021-03-10 ENCOUNTER — VIRTUAL VISIT (OUTPATIENT)
Dept: INTERNAL MEDICINE | Facility: CLINIC | Age: 56
End: 2021-03-10
Payer: COMMERCIAL

## 2021-03-10 DIAGNOSIS — Q68.1 THUMB IN PALM DEFORMITY: ICD-10-CM

## 2021-03-10 DIAGNOSIS — F41.9 ANXIETY: ICD-10-CM

## 2021-03-10 DIAGNOSIS — R10.84 ABDOMINAL PAIN, GENERALIZED: Primary | ICD-10-CM

## 2021-03-10 DIAGNOSIS — I10 ESSENTIAL HYPERTENSION, BENIGN: ICD-10-CM

## 2021-03-10 DIAGNOSIS — R79.89 LFT ELEVATION: ICD-10-CM

## 2021-03-10 PROCEDURE — 84460 ALANINE AMINO (ALT) (SGPT): CPT | Performed by: INTERNAL MEDICINE

## 2021-03-10 PROCEDURE — 99213 OFFICE O/P EST LOW 20 MIN: CPT | Mod: TEL | Performed by: INTERNAL MEDICINE

## 2021-03-10 PROCEDURE — 36415 COLL VENOUS BLD VENIPUNCTURE: CPT | Performed by: INTERNAL MEDICINE

## 2021-03-10 PROCEDURE — 84450 TRANSFERASE (AST) (SGOT): CPT | Performed by: INTERNAL MEDICINE

## 2021-03-10 NOTE — PROGRESS NOTES
Jayleen is a 55 year old who is being evaluated via a billable video visit.      How would you like to obtain your AVS? MyChart  If the video visit is dropped, the invitation should be resent by: Text to cell phone: [phone]   Will anyone else be joining your video visit? 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    New Castellanos MD  [facility]    Subjective   Jayleen is a 55 year old who presents for the following health issues     HPI   Chief Complaint   Patient presents with     Pain     Stomach pain           Presets with stomach pain, indigestion , mild for one day.   Has had mild nausea, related to increased stress.   No vomiting, no change of BMs, no fevers.   Has h/o anxiety worsened with increased stress from work  Concern for right thumb hyperextention affecting normal functioning of the hand. Has had surgery in the past.   Has h/o HTN. on medical treatment. BP has not been controlled. No side effects from medications. No CP, HA, dizziness. good compliance with medications and low salt diet.        Review of Systems   Constitutional, HEENT, cardiovascular, pulmonary, gi and gu systems are negative, except as otherwise noted.      Objective           Vitals:  No vitals were obtained today due to virtual visit.    Physical Exam   Normal speech and affect               Phone call time 8 min

## 2021-03-11 LAB
ALT SERPL W P-5'-P-CCNC: 28 U/L (ref 0–50)
AST SERPL W P-5'-P-CCNC: 17 U/L (ref 0–45)

## 2021-03-21 ENCOUNTER — HEALTH MAINTENANCE LETTER (OUTPATIENT)
Age: 56
End: 2021-03-21

## 2021-03-23 ENCOUNTER — MYC REFILL (OUTPATIENT)
Dept: INTERNAL MEDICINE | Facility: CLINIC | Age: 56
End: 2021-03-23

## 2021-03-23 DIAGNOSIS — F98.8 ATTENTION DEFICIT DISORDER, UNSPECIFIED HYPERACTIVITY PRESENCE: ICD-10-CM

## 2021-03-23 DIAGNOSIS — F41.1 GAD (GENERALIZED ANXIETY DISORDER): ICD-10-CM

## 2021-03-25 RX ORDER — METHYLPHENIDATE HYDROCHLORIDE 20 MG/1
20 TABLET ORAL 2 TIMES DAILY
Qty: 60 TABLET | Refills: 0 | Status: SHIPPED | OUTPATIENT
Start: 2021-03-25 | End: 2021-04-19

## 2021-03-25 RX ORDER — ALPRAZOLAM 0.5 MG
0.5 TABLET ORAL DAILY PRN
Qty: 30 TABLET | Refills: 0 | Status: SHIPPED | OUTPATIENT
Start: 2021-03-25 | End: 2021-04-19

## 2021-03-25 NOTE — TELEPHONE ENCOUNTER
Controlled Substance Refill Request for Ritalin, Alprazolam  Problem List Complete:  No     PROVIDER TO CONSIDER COMPLETION OF PROBLEM LIST AND OVERVIEW/CONTROLLED SUBSTANCE AGREEMENT    Last Written Prescription Date:  2/24/21, 2/26/21  Last Fill Quantity: 60, 30,   # refills: 0    Last Office Visit with [facility] primary care provider: 3/10/21    Future Office visit:     Controlled substance agreement:   Encounter-Level CSA - 05/16/2016:    Controlled Substance Agreement - Scan on 5/26/2016 12:16 PM: Controlled Substance Agreement-5/17/2016     Patient-Level CSA:    There are no patient-level csa.         Last Urine Drug Screen: No results found for: CDAUT, No results found for: COMDAT, No results found for: THC13, PCP13, COC13, MAMP13, OPI13, AMP13, BZO13, TCA13, MTD13, BAR13, OXY13, PPX13, BUP13    [email or web address]    Writer is not authorized to check  for provider

## 2021-04-03 ENCOUNTER — MYC MEDICAL ADVICE (OUTPATIENT)
Dept: INTERNAL MEDICINE | Facility: CLINIC | Age: 56
End: 2021-04-03

## 2021-04-03 DIAGNOSIS — T78.40XD ALLERGIC REACTION, SUBSEQUENT ENCOUNTER: ICD-10-CM

## 2021-04-05 RX ORDER — EPINEPHRINE 0.3 MG/.3ML
INJECTION SUBCUTANEOUS
Qty: 2 EACH | Refills: 1 | Status: SHIPPED | OUTPATIENT
Start: 2021-04-05 | End: 2022-06-07

## 2021-04-15 NOTE — TELEPHONE ENCOUNTER
Pt calling for below refill request:    Controlled Substance Refill Request for Ritalin  Problem List Complete:  No     PROVIDER TO CONSIDER COMPLETION OF PROBLEM LIST AND OVERVIEW/CONTROLLED SUBSTANCE AGREEMENT    Last Written Prescription Date:  7/23/19  Last Fill Quantity: 60,   # refills: 0    THE MOST RECENT OFFICE VISIT MUST BE WITHIN THE PAST 3 MONTHS. AT LEAST ONE FACE TO FACE VISIT MUST OCCUR EVERY 6 MONTHS. ADDITIONAL VISITS CAN BE VIRTUAL.  (THIS STATEMENT SHOULD BE DELETED.)    Last Office Visit with [facility] primary care provider: 3/6/19    Future Office visit:     Controlled substance agreement:   Encounter-Level CSA - 05/16/2016:    Controlled Substance Agreement - Scan on 5/26/2016 12:16 PM: Controlled Substance Agreement-5/17/2016 (below)       Patient-Level CSA:    There are no patient-level csa.         Last Urine Drug Screen: No results found for: CDAUT, No results found for: COMDAT, No results found for: THC13, PCP13, COC13, MAMP13, OPI13, AMP13, BZO13, TCA13, MTD13, BAR13, OXY13, PPX13, BUP13     Processing:  Staff will hand deliver Rx to on-site pharmacy     [email or web address]       checked in past 3 months?  No, route to RN        
Script Signed    New Castellanos MD    
[phone]

## 2021-04-19 ENCOUNTER — MYC REFILL (OUTPATIENT)
Dept: INTERNAL MEDICINE | Facility: CLINIC | Age: 56
End: 2021-04-19

## 2021-04-19 DIAGNOSIS — F98.8 ATTENTION DEFICIT DISORDER, UNSPECIFIED HYPERACTIVITY PRESENCE: ICD-10-CM

## 2021-04-19 DIAGNOSIS — F41.1 GAD (GENERALIZED ANXIETY DISORDER): ICD-10-CM

## 2021-04-20 RX ORDER — ALPRAZOLAM 0.5 MG
0.5 TABLET ORAL DAILY PRN
Qty: 30 TABLET | Refills: 0 | Status: SHIPPED | OUTPATIENT
Start: 2021-04-20 | End: 2021-05-19

## 2021-04-20 RX ORDER — METHYLPHENIDATE HYDROCHLORIDE 20 MG/1
20 TABLET ORAL 2 TIMES DAILY
Qty: 60 TABLET | Refills: 0 | Status: SHIPPED | OUTPATIENT
Start: 2021-04-20 | End: 2021-05-21

## 2021-04-20 NOTE — TELEPHONE ENCOUNTER
Routing refill request to provider for review/approval because:  Drug not on the [facility], [facility] or [facility] refill protocol or controlled substance    Stefania B - Registered Nurse  [facility]  Acute and Diagnostic Services

## 2021-05-15 ENCOUNTER — HEALTH MAINTENANCE LETTER (OUTPATIENT)
Age: 56
End: 2021-05-15

## 2021-05-19 ENCOUNTER — MYC REFILL (OUTPATIENT)
Dept: INTERNAL MEDICINE | Facility: CLINIC | Age: 56
End: 2021-05-19

## 2021-05-19 DIAGNOSIS — F41.1 GAD (GENERALIZED ANXIETY DISORDER): ICD-10-CM

## 2021-05-20 ENCOUNTER — MYC MEDICAL ADVICE (OUTPATIENT)
Dept: INTERNAL MEDICINE | Facility: CLINIC | Age: 56
End: 2021-05-20

## 2021-05-20 RX ORDER — ALPRAZOLAM 0.5 MG
0.5 TABLET ORAL DAILY PRN
Qty: 30 TABLET | Refills: 0 | Status: SHIPPED | OUTPATIENT
Start: 2021-05-26 | End: 2021-06-21

## 2021-05-20 NOTE — TELEPHONE ENCOUNTER
Pending Prescriptions:                       Disp   Refills    ALPRAZolam (XANAX) 0.5 MG tablet           30 tab*0        Sig: Take 1 tablet (0.5 mg) by mouth daily as needed for           anxiety    Routing refill request to provider for review/approval because:  Drug not on the [facility] refill protocol

## 2021-05-21 ENCOUNTER — MYC REFILL (OUTPATIENT)
Dept: INTERNAL MEDICINE | Facility: CLINIC | Age: 56
End: 2021-05-21

## 2021-05-21 ENCOUNTER — TELEPHONE (OUTPATIENT)
Dept: INTERNAL MEDICINE | Facility: CLINIC | Age: 56
End: 2021-05-21

## 2021-05-21 ENCOUNTER — MYC MEDICAL ADVICE (OUTPATIENT)
Dept: INTERNAL MEDICINE | Facility: CLINIC | Age: 56
End: 2021-05-21

## 2021-05-21 DIAGNOSIS — F98.8 ATTENTION DEFICIT DISORDER, UNSPECIFIED HYPERACTIVITY PRESENCE: ICD-10-CM

## 2021-05-21 RX ORDER — METHYLPHENIDATE HYDROCHLORIDE 20 MG/1
20 TABLET ORAL 2 TIMES DAILY
Qty: 60 TABLET | Refills: 0 | Status: SHIPPED | OUTPATIENT
Start: 2021-05-21 | End: 2021-06-13

## 2021-05-21 RX ORDER — METHYLPHENIDATE HYDROCHLORIDE 20 MG/1
20 TABLET ORAL 2 TIMES DAILY
Qty: 60 TABLET | Refills: 0 | OUTPATIENT
Start: 2021-05-21

## 2021-05-21 NOTE — TELEPHONE ENCOUNTER
Covering for PCP.   reviewed.  Last sent on 4/20/2021, but filled on 4/26/2021.  She should be good till 5/25/2021.   How long is she gone?  She shouldn't get next refill till 6/23/2021.  Ok for picking it early.    Jazmine Zaman MD

## 2021-05-21 NOTE — TELEPHONE ENCOUNTER
See her FPSI message.   Will let her know not to send messages regarding another patient in her chart.

## 2021-05-21 NOTE — TELEPHONE ENCOUNTER
Ritalin      Last Written Prescription Date:  04/20/21  Last Fill Quantity: 60,   # refills: 0  Last Office Visit: VV 03/10/21  Future Office visit:       Routing refill request to provider for review/approval because:  Drug not on the [facility], [facility] or [facility] refill protocol or controlled substance

## 2021-05-21 NOTE — TELEPHONE ENCOUNTER
Pharmacy calling again regarding message below.  States patient wants to  medication today due to she is going out of town.    Need ok to  medication x 2 days early.    Routed to covering provider.

## 2021-05-21 NOTE — TELEPHONE ENCOUNTER
Pending Prescriptions:                       Disp   Refills    methylphenidate (RITALIN) 20 MG tablet     60 tab*0        Sig: Take 1 tablet (20 mg) by mouth 2 times daily    Routing refill request to provider for review/approval because:  Drug not on the [facility] refill protocol

## 2021-06-08 NOTE — TELEPHONE ENCOUNTER
Assessment & Plan   Fatigue, unspecified type  Labs pending.  - CBC with platelets differential  - ESR: Erythrocyte sedimentation rate  - Lyme Disease Nicky with reflex to WB Serum  - Hepatic panel    Pharyngitis, unspecified etiology  TC pending.  - Streptococcus A Rapid Scr w Reflx to PCR    Persistent proteinuria  - Protein  random urine with Creat Ratio  - Basic metabolic panel    MARKELL Trinh [facility] JULIET Ferris is a 58 year old who presents for the following health issues:  HPI   Acute Illness  Acute illness concerns: ear ache L, sore throat only at night and fatigue  Onset/Duration: 06/03/21 - negative Covid   Symptoms:  Fever: no  Chills/Sweats: YES  Headache (location?): no  Sinus Pressure: no  Conjunctivitis:  no  Ear Pain: YES: left  Rhinorrhea: no  Congestion: no  Sore Throat: YES- only at night time it gets dry  Cough: YES-non-productive  Wheeze: no  Decreased Appetite: no  Nausea: no  Vomiting: no  Diarrhea: no  Dysuria/Freq.: no  Dysuria or Hematuria: no  Fatigue/Achiness: YES fatigue; achy initially  Sick/Strep Exposure: daughter same symptoms   Therapies tried and outcome: ibuprofen at night time only  Exposed to lymes--spends time outdoors.     Patient will be seeing nephrology this month. Would like to obtain labs today.    Review of Systems   Constitutional, HEENT, cardiovascular, pulmonary, gi and gu systems are negative, except as otherwise noted.      Objective    /78   Pulse 66   Temp 98.4  F (36.9  C) (Tympanic)   Resp 16   Wt 80.3 kg (177 lb)   SpO2 98%   BMI 26.14 kg/m    Body mass index is 26.14 kg/m .  Physical Exam   GENERAL: alert and no distress  EYES: Eyes grossly normal to inspection, PERRL and conjunctivae and sclerae normal  HENT: ear canals and TM's normal, nose and mouth--erythemic posterior pharynx; no sinus tenderness   NECK: tonsillar LAD  RESP: lungs clear to auscultation - no rales, rhonchi or wheezes  CV:  Pharmacy is asking if pt can get her Ritalin early.   According to their records she should have enough pills until Next Wednesday.   Soonest she should be able to fill is this Monday. 5/24.     Dr Castellanos is gone for the day.     Please advise if OK to  Rx early.     Call pharmacy with OK.        regular rate and rhythm, normal S1 S2, no S3 or S4

## 2021-06-13 ENCOUNTER — MYC REFILL (OUTPATIENT)
Dept: INTERNAL MEDICINE | Facility: CLINIC | Age: 56
End: 2021-06-13

## 2021-06-13 DIAGNOSIS — F98.8 ATTENTION DEFICIT DISORDER, UNSPECIFIED HYPERACTIVITY PRESENCE: ICD-10-CM

## 2021-06-15 NOTE — TELEPHONE ENCOUNTER
Routing refill request to provider for review/approval because:  Drug not on the [facility] refill protocol   Jack Goff RN, BSN

## 2021-06-16 RX ORDER — METHYLPHENIDATE HYDROCHLORIDE 20 MG/1
20 TABLET ORAL 2 TIMES DAILY
Qty: 60 TABLET | Refills: 0 | Status: SHIPPED | OUTPATIENT
Start: 2021-06-16 | End: 2021-07-13

## 2021-06-21 ENCOUNTER — MYC REFILL (OUTPATIENT)
Dept: INTERNAL MEDICINE | Facility: CLINIC | Age: 56
End: 2021-06-21

## 2021-06-21 DIAGNOSIS — F41.1 GAD (GENERALIZED ANXIETY DISORDER): ICD-10-CM

## 2021-06-23 NOTE — TELEPHONE ENCOUNTER
The sheath is removed from the right internal jugular vein.  Xanax refill request     Last refill on 5/26/21 for #30     Routing refill request to provider for review/approval because:  Drug not on the [facility] refill protocol

## 2021-06-24 RX ORDER — ALPRAZOLAM 0.5 MG
0.5 TABLET ORAL DAILY PRN
Qty: 30 TABLET | Refills: 0 | Status: SHIPPED | OUTPATIENT
Start: 2021-06-24 | End: 2021-07-20

## 2021-07-13 ENCOUNTER — MYC REFILL (OUTPATIENT)
Dept: INTERNAL MEDICINE | Facility: CLINIC | Age: 56
End: 2021-07-13

## 2021-07-13 DIAGNOSIS — F98.8 ATTENTION DEFICIT DISORDER, UNSPECIFIED HYPERACTIVITY PRESENCE: ICD-10-CM

## 2021-07-14 NOTE — TELEPHONE ENCOUNTER
Pt called to check status of this request, advised we have received it and are working on it. Confirmed pharmacy--no further questions at this time.    Kaylie Gill on 7/14/2021 at 7:44 AM

## 2021-07-15 RX ORDER — METHYLPHENIDATE HYDROCHLORIDE 20 MG/1
20 TABLET ORAL 2 TIMES DAILY
Qty: 60 TABLET | Refills: 0 | Status: SHIPPED | OUTPATIENT
Start: 2021-07-15 | End: 2021-08-08

## 2021-07-20 ENCOUNTER — MYC REFILL (OUTPATIENT)
Dept: INTERNAL MEDICINE | Facility: CLINIC | Age: 56
End: 2021-07-20

## 2021-07-20 DIAGNOSIS — F41.1 GAD (GENERALIZED ANXIETY DISORDER): ICD-10-CM

## 2021-07-22 RX ORDER — ALPRAZOLAM 0.5 MG
0.5 TABLET ORAL DAILY PRN
Qty: 30 TABLET | Refills: 0 | Status: SHIPPED | OUTPATIENT
Start: 2021-07-22 | End: 2021-08-27

## 2021-07-22 RX ORDER — ALPRAZOLAM 0.5 MG
TABLET ORAL
Qty: 30 TABLET | Refills: 0 | OUTPATIENT
Start: 2021-07-22

## 2021-08-08 ENCOUNTER — MYC REFILL (OUTPATIENT)
Dept: INTERNAL MEDICINE | Facility: CLINIC | Age: 56
End: 2021-08-08

## 2021-08-08 ENCOUNTER — MYC REFILL (OUTPATIENT)
Dept: NURSING | Facility: CLINIC | Age: 56
End: 2021-08-08

## 2021-08-08 DIAGNOSIS — I10 ESSENTIAL HYPERTENSION: ICD-10-CM

## 2021-08-08 DIAGNOSIS — G62.9 NEUROPATHY: ICD-10-CM

## 2021-08-08 DIAGNOSIS — F98.8 ATTENTION DEFICIT DISORDER, UNSPECIFIED HYPERACTIVITY PRESENCE: ICD-10-CM

## 2021-08-09 DIAGNOSIS — G62.9 NEUROPATHY: ICD-10-CM

## 2021-08-10 RX ORDER — LISINOPRIL 20 MG/1
20 TABLET ORAL DAILY
Qty: 90 TABLET | Refills: 0 | Status: SHIPPED | OUTPATIENT
Start: 2021-08-10 | End: 2021-12-17

## 2021-08-10 RX ORDER — METHYLPHENIDATE HYDROCHLORIDE 20 MG/1
20 TABLET ORAL 2 TIMES DAILY
Qty: 60 TABLET | Refills: 0 | Status: SHIPPED | OUTPATIENT
Start: 2021-08-10 | End: 2021-09-04

## 2021-08-10 RX ORDER — GABAPENTIN 600 MG/1
600 TABLET ORAL 3 TIMES DAILY
Qty: 270 TABLET | Refills: 0 | Status: SHIPPED | OUTPATIENT
Start: 2021-08-10 | End: 2024-04-05

## 2021-08-10 RX ORDER — GABAPENTIN 600 MG/1
600 TABLET ORAL 3 TIMES DAILY
Qty: 270 TABLET | Refills: 0 | Status: SHIPPED | OUTPATIENT
Start: 2021-08-10 | End: 2022-10-13

## 2021-08-27 ENCOUNTER — MYC REFILL (OUTPATIENT)
Dept: INTERNAL MEDICINE | Facility: CLINIC | Age: 56
End: 2021-08-27

## 2021-08-27 DIAGNOSIS — F41.1 GAD (GENERALIZED ANXIETY DISORDER): ICD-10-CM

## 2021-09-01 RX ORDER — ALPRAZOLAM 0.5 MG
0.5 TABLET ORAL DAILY PRN
Qty: 30 TABLET | Refills: 0 | Status: SHIPPED | OUTPATIENT
Start: 2021-09-01 | End: 2021-09-28

## 2021-09-04 ENCOUNTER — MYC REFILL (OUTPATIENT)
Dept: INTERNAL MEDICINE | Facility: CLINIC | Age: 56
End: 2021-09-04

## 2021-09-04 ENCOUNTER — HEALTH MAINTENANCE LETTER (OUTPATIENT)
Age: 56
End: 2021-09-04

## 2021-09-04 DIAGNOSIS — F98.8 ATTENTION DEFICIT DISORDER, UNSPECIFIED HYPERACTIVITY PRESENCE: ICD-10-CM

## 2021-09-08 RX ORDER — METHYLPHENIDATE HYDROCHLORIDE 20 MG/1
20 TABLET ORAL 2 TIMES DAILY
Qty: 60 TABLET | Refills: 0 | Status: SHIPPED | OUTPATIENT
Start: 2021-09-08 | End: 2021-10-01

## 2021-09-28 ENCOUNTER — MYC REFILL (OUTPATIENT)
Dept: INTERNAL MEDICINE | Facility: CLINIC | Age: 56
End: 2021-09-28

## 2021-09-28 DIAGNOSIS — F41.1 GAD (GENERALIZED ANXIETY DISORDER): ICD-10-CM

## 2021-09-29 RX ORDER — ALPRAZOLAM 0.5 MG
0.5 TABLET ORAL DAILY PRN
Qty: 30 TABLET | Refills: 0 | Status: SHIPPED | OUTPATIENT
Start: 2021-09-29 | End: 2021-11-08

## 2021-10-01 ENCOUNTER — MYC REFILL (OUTPATIENT)
Dept: INTERNAL MEDICINE | Facility: CLINIC | Age: 56
End: 2021-10-01

## 2021-10-01 DIAGNOSIS — F98.8 ATTENTION DEFICIT DISORDER, UNSPECIFIED HYPERACTIVITY PRESENCE: ICD-10-CM

## 2021-10-01 RX ORDER — METHYLPHENIDATE HYDROCHLORIDE 20 MG/1
20 TABLET ORAL 2 TIMES DAILY
Qty: 60 TABLET | Refills: 0 | Status: SHIPPED | OUTPATIENT
Start: 2021-10-01 | End: 2021-11-08

## 2021-10-25 ENCOUNTER — TRANSFERRED RECORDS (OUTPATIENT)
Dept: HEALTH INFORMATION MANAGEMENT | Facility: CLINIC | Age: 56
End: 2021-10-25

## 2021-11-05 ENCOUNTER — TRANSFERRED RECORDS (OUTPATIENT)
Dept: HEALTH INFORMATION MANAGEMENT | Facility: CLINIC | Age: 56
End: 2021-11-05
Payer: COMMERCIAL

## 2021-11-08 ENCOUNTER — MYC REFILL (OUTPATIENT)
Dept: INTERNAL MEDICINE | Facility: CLINIC | Age: 56
End: 2021-11-08
Payer: COMMERCIAL

## 2021-11-08 ENCOUNTER — TELEPHONE (OUTPATIENT)
Dept: INTERNAL MEDICINE | Facility: CLINIC | Age: 56
End: 2021-11-08
Payer: COMMERCIAL

## 2021-11-08 DIAGNOSIS — F41.1 GAD (GENERALIZED ANXIETY DISORDER): ICD-10-CM

## 2021-11-08 DIAGNOSIS — F98.8 ATTENTION DEFICIT DISORDER, UNSPECIFIED HYPERACTIVITY PRESENCE: ICD-10-CM

## 2021-11-08 RX ORDER — ALPRAZOLAM 0.5 MG
0.5 TABLET ORAL DAILY PRN
Qty: 30 TABLET | Refills: 0 | Status: SHIPPED | OUTPATIENT
Start: 2021-11-08 | End: 2021-12-04

## 2021-11-08 RX ORDER — METHYLPHENIDATE HYDROCHLORIDE 20 MG/1
20 TABLET ORAL 2 TIMES DAILY
Qty: 60 TABLET | Refills: 0 | Status: SHIPPED | OUTPATIENT
Start: 2021-11-08 | End: 2021-12-04

## 2021-11-08 NOTE — TELEPHONE ENCOUNTER
Reason for call:  Medication   If this is a refill request, has the caller requested the refill from the pharmacy already? No  Will the patient be using a [facility]? No  Name of the pharmacy and phone number for the current request: [facility] [phone]    Name of the medication requested: methylphenidate (RITALIN) 20 MG tablet    Other request: N/A    Phone number to reach patient:  Cell number on file:    Telephone Information:   Mobile [phone]       Best Time:  Anytime    Can we leave a detailed message on this number?  YES    Travel screening: Not Applicable

## 2021-11-16 ENCOUNTER — VIRTUAL VISIT (OUTPATIENT)
Dept: INTERNAL MEDICINE | Facility: CLINIC | Age: 56
End: 2021-11-16
Payer: COMMERCIAL

## 2021-11-16 DIAGNOSIS — I10 ESSENTIAL HYPERTENSION, BENIGN: Primary | ICD-10-CM

## 2021-11-16 DIAGNOSIS — F98.8 ATTENTION DEFICIT DISORDER, UNSPECIFIED HYPERACTIVITY PRESENCE: ICD-10-CM

## 2021-11-16 DIAGNOSIS — F41.1 GAD (GENERALIZED ANXIETY DISORDER): ICD-10-CM

## 2021-11-16 PROCEDURE — 99214 OFFICE O/P EST MOD 30 MIN: CPT | Mod: TEL | Performed by: INTERNAL MEDICINE

## 2021-11-16 NOTE — PROGRESS NOTES
Jayleen is a 56 year old who is being evaluated via a billable telephone visit.      What phone number would you like to be contacted at? [phone]  How would you like to obtain your AVS? Mail a copy    Assessment & Plan     Essential hypertension, benign  Controlled BP, cont treatment     REBEKAH (generalized anxiety disorder)  On PRN treatment , controlled     Attention deficit disorder, unspecified hyperactivity presence  On Ritalin, no side effects, helps with symptoms                See Patient Instructions    Return in about 4 weeks (around 12/14/2021) for Physical Exam.    New Castellanos MD  [facility]   Jayleen is a 56 year old who presents for the following health issues   Patient is being seen for an medication check.  HPI     Hypertension Follow-up  Has h/o HTN. on medical treatment. BP has been controlled. No side effects from medications. No CP, HA, dizziness. good compliance with medications and low salt diet.      Do you check your blood pressure regularly outside of the clinic? Yes     Are you following a low salt diet? Yes    Are your blood pressures ever more than 140 on the top number (systolic) OR more   than 90 on the bottom number (diastolic), for example 140/90? No      How many servings of fruits and vegetables do you eat daily?  0-1    On average, how many sweetened beverages do you drink each day (Examples: soda, juice, sweet tea, etc.  Do NOT count diet or artificially sweetened beverages)?   0    How many days per week do you exercise enough to make your heart beat faster? 3 or less    How many minutes a day do you exercise enough to make your heart beat faster? 30 - 60    How many days per week do you miss taking your medication? 0    Has h/o ADHD, on Ritalin, helps with concentration, no side effects. Helps with doing her work, finishing tasks     Has h/o anxiety, on increased symptoms. On PRN Ativan.     Review of Systems   Constitutional, HEENT,  cardiovascular, pulmonary, gi and gu systems are negative, except as otherwise noted.      Objective           Vitals:  No vitals were obtained today due to virtual visit.    Physical Exam   healthy, alert and no distress  PSYCH: Alert and oriented times 3; coherent speech, normal   rate and volume, able to articulate logical thoughts, able   to abstract reason, no tangential thoughts, no hallucinations   or delusions  Her affect is normal  RESP: No cough, no audible wheezing, able to talk in full sentences  Remainder of exam unable to be completed due to telephone visits    Orders Only on 03/10/2021   Component Date Value Ref Range Status     ALT 03/10/2021 28  0 - 50 U/L Final     AST 03/10/2021 17  0 - 45 U/L Final               Phone call duration: 9 minutes

## 2021-12-04 ENCOUNTER — MYC REFILL (OUTPATIENT)
Dept: INTERNAL MEDICINE | Facility: CLINIC | Age: 56
End: 2021-12-04
Payer: COMMERCIAL

## 2021-12-04 DIAGNOSIS — F98.8 ATTENTION DEFICIT DISORDER, UNSPECIFIED HYPERACTIVITY PRESENCE: ICD-10-CM

## 2021-12-04 DIAGNOSIS — F41.1 GAD (GENERALIZED ANXIETY DISORDER): ICD-10-CM

## 2021-12-08 RX ORDER — ALPRAZOLAM 0.5 MG
0.5 TABLET ORAL DAILY PRN
Qty: 30 TABLET | Refills: 0 | Status: SHIPPED | OUTPATIENT
Start: 2021-12-08 | End: 2022-01-05

## 2021-12-08 RX ORDER — METHYLPHENIDATE HYDROCHLORIDE 20 MG/1
20 TABLET ORAL 2 TIMES DAILY
Qty: 60 TABLET | Refills: 0 | Status: SHIPPED | OUTPATIENT
Start: 2021-12-08 | End: 2022-01-05

## 2021-12-15 DIAGNOSIS — I10 ESSENTIAL HYPERTENSION: ICD-10-CM

## 2021-12-17 RX ORDER — LISINOPRIL 20 MG/1
20 TABLET ORAL DAILY
Qty: 90 TABLET | Refills: 0 | Status: SHIPPED | OUTPATIENT
Start: 2021-12-17 | End: 2022-06-28

## 2021-12-17 NOTE — TELEPHONE ENCOUNTER
Pending Prescriptions:                       Disp   Refills    lisinopril (ZESTRIL) 20 MG tablet          90 tab*0        Sig: Take 1 tablet (20 mg) by mouth daily  Routing refill request to provider for review/approval because:  Fails protocol  Next 5 appointments (look out 90 days)      Jan 11, 2022  4:30 PM  (Arrive by 4:10 PM)  Provider Visit with New Castellanos MD  [facility] ([facility] ) [facility] [address]  [facility] [phone]

## 2021-12-28 ENCOUNTER — DOCUMENTATION ONLY (OUTPATIENT)
Dept: LAB | Facility: CLINIC | Age: 56
End: 2021-12-28
Payer: COMMERCIAL

## 2021-12-28 DIAGNOSIS — Z00.00 ENCOUNTER FOR PREVENTATIVE ADULT HEALTH CARE EXAMINATION: Primary | ICD-10-CM

## 2022-01-05 ENCOUNTER — MYC REFILL (OUTPATIENT)
Dept: INTERNAL MEDICINE | Facility: CLINIC | Age: 57
End: 2022-01-05
Payer: COMMERCIAL

## 2022-01-05 DIAGNOSIS — F41.1 GAD (GENERALIZED ANXIETY DISORDER): ICD-10-CM

## 2022-01-05 DIAGNOSIS — F98.8 ATTENTION DEFICIT DISORDER, UNSPECIFIED HYPERACTIVITY PRESENCE: ICD-10-CM

## 2022-01-05 RX ORDER — METHYLPHENIDATE HYDROCHLORIDE 20 MG/1
20 TABLET ORAL 2 TIMES DAILY
Qty: 60 TABLET | Refills: 0 | Status: SHIPPED | OUTPATIENT
Start: 2022-01-05 | End: 2022-02-01

## 2022-01-05 RX ORDER — ALPRAZOLAM 0.5 MG
0.5 TABLET ORAL DAILY PRN
Qty: 30 TABLET | Refills: 0 | Status: SHIPPED | OUTPATIENT
Start: 2022-01-05 | End: 2022-02-01

## 2022-01-05 NOTE — TELEPHONE ENCOUNTER
Routing refill request to provider for review/approval because:  Drug not on the [facility] refill protocol     Pending Prescriptions:                       Disp   Refills    methylphenidate (RITALIN) 20 MG tablet     60 tab*0        Sig: Take 1 tablet (20 mg) by mouth 2 times daily    Next 5 appointments (look out 90 days)      Jan 11, 2022  4:30 PM  (Arrive by 4:10 PM)  Provider Visit with New Castellanos MD  [facility] ([facility] ) [facility] [address]  [facility] [id number] [phone]

## 2022-01-05 NOTE — TELEPHONE ENCOUNTER
Routing refill request to provider for review/approval because:  Drug not on the [facility] refill protocol     Pending Prescriptions:                       Disp   Refills    ALPRAZolam (XANAX) 0.5 MG tablet           30 tab*0        Sig: Take 1 tablet (0.5 mg) by mouth daily as needed for           anxiety    Next 5 appointments (look out 90 days)      Jan 11, 2022  4:30 PM  (Arrive by 4:10 PM)  Provider Visit with New Castellanos MD  [facility] ([facility] ) [address]  [facility] [phone]

## 2022-02-01 ENCOUNTER — MYC REFILL (OUTPATIENT)
Dept: INTERNAL MEDICINE | Facility: CLINIC | Age: 57
End: 2022-02-01
Payer: COMMERCIAL

## 2022-02-01 DIAGNOSIS — F41.1 GAD (GENERALIZED ANXIETY DISORDER): ICD-10-CM

## 2022-02-01 DIAGNOSIS — F98.8 ATTENTION DEFICIT DISORDER, UNSPECIFIED HYPERACTIVITY PRESENCE: ICD-10-CM

## 2022-02-01 RX ORDER — METHYLPHENIDATE HYDROCHLORIDE 20 MG/1
20 TABLET ORAL 2 TIMES DAILY
Qty: 60 TABLET | Refills: 0 | Status: SHIPPED | OUTPATIENT
Start: 2022-02-01 | End: 2022-02-27

## 2022-02-01 RX ORDER — ALPRAZOLAM 0.5 MG
0.5 TABLET ORAL DAILY PRN
Qty: 30 TABLET | Refills: 0 | Status: SHIPPED | OUTPATIENT
Start: 2022-02-01 | End: 2022-02-27

## 2022-02-27 ENCOUNTER — MYC REFILL (OUTPATIENT)
Dept: INTERNAL MEDICINE | Facility: CLINIC | Age: 57
End: 2022-02-27
Payer: COMMERCIAL

## 2022-02-27 DIAGNOSIS — F41.1 GAD (GENERALIZED ANXIETY DISORDER): ICD-10-CM

## 2022-02-27 DIAGNOSIS — F98.8 ATTENTION DEFICIT DISORDER, UNSPECIFIED HYPERACTIVITY PRESENCE: ICD-10-CM

## 2022-02-28 RX ORDER — METHYLPHENIDATE HYDROCHLORIDE 20 MG/1
20 TABLET ORAL 2 TIMES DAILY
Qty: 60 TABLET | Refills: 0 | Status: SHIPPED | OUTPATIENT
Start: 2022-02-28 | End: 2022-04-04

## 2022-02-28 RX ORDER — ALPRAZOLAM 0.5 MG
0.5 TABLET ORAL DAILY PRN
Qty: 30 TABLET | Refills: 0 | Status: SHIPPED | OUTPATIENT
Start: 2022-02-28 | End: 2022-04-04

## 2022-04-01 DIAGNOSIS — F98.8 ATTENTION DEFICIT DISORDER, UNSPECIFIED HYPERACTIVITY PRESENCE: ICD-10-CM

## 2022-04-01 DIAGNOSIS — F41.1 GAD (GENERALIZED ANXIETY DISORDER): ICD-10-CM

## 2022-04-02 ENCOUNTER — MYC REFILL (OUTPATIENT)
Dept: INTERNAL MEDICINE | Facility: CLINIC | Age: 57
End: 2022-04-02
Payer: COMMERCIAL

## 2022-04-02 DIAGNOSIS — F41.1 GAD (GENERALIZED ANXIETY DISORDER): ICD-10-CM

## 2022-04-02 DIAGNOSIS — F98.8 ATTENTION DEFICIT DISORDER, UNSPECIFIED HYPERACTIVITY PRESENCE: ICD-10-CM

## 2022-04-04 RX ORDER — ALPRAZOLAM 0.5 MG
TABLET ORAL
Qty: 30 TABLET | Refills: 0 | Status: SHIPPED | OUTPATIENT
Start: 2022-04-04 | End: 2022-04-28

## 2022-04-04 RX ORDER — METHYLPHENIDATE HYDROCHLORIDE 20 MG/1
20 TABLET ORAL 2 TIMES DAILY
Qty: 60 TABLET | Refills: 0 | OUTPATIENT
Start: 2022-04-04

## 2022-04-04 RX ORDER — METHYLPHENIDATE HYDROCHLORIDE 20 MG/1
TABLET ORAL
Qty: 60 TABLET | Refills: 0 | Status: SHIPPED | OUTPATIENT
Start: 2022-04-04 | End: 2022-04-29

## 2022-04-04 RX ORDER — ALPRAZOLAM 0.5 MG
0.5 TABLET ORAL DAILY PRN
Qty: 30 TABLET | Refills: 0 | OUTPATIENT
Start: 2022-04-04

## 2022-04-04 NOTE — TELEPHONE ENCOUNTER
Medication refused, duplicate request.     Pending Prescriptions:                       Disp   Refills    methylphenidate (RITALIN) 20 MG tablet    60 tab*0            Sig: Take 1 tablet (20 mg) by mouth 2 times daily    ALPRAZolam (XANAX) 0.5 MG tablet          30 tab*0            Sig: Take 1 tablet (0.5 mg) by mouth daily as needed           for anxiety        Myc message sent to patient.

## 2022-04-16 ENCOUNTER — HEALTH MAINTENANCE LETTER (OUTPATIENT)
Age: 57
End: 2022-04-16

## 2022-04-28 ENCOUNTER — MYC REFILL (OUTPATIENT)
Dept: INTERNAL MEDICINE | Facility: CLINIC | Age: 57
End: 2022-04-28
Payer: COMMERCIAL

## 2022-04-28 DIAGNOSIS — F41.1 GAD (GENERALIZED ANXIETY DISORDER): ICD-10-CM

## 2022-04-28 DIAGNOSIS — F98.8 ATTENTION DEFICIT DISORDER, UNSPECIFIED HYPERACTIVITY PRESENCE: ICD-10-CM

## 2022-04-28 NOTE — TELEPHONE ENCOUNTER
Pending Prescriptions:                       Disp   Refills    methylphenidate (RITALIN) 20 MG tablet [Ph*60 tab*0        Sig: Take 1 tablet by mouth 2 times daily.    Routing refill request to provider for review/approval because:  Drug not on the [facility] refill protocol     Please advise, thanks.

## 2022-04-29 RX ORDER — METHYLPHENIDATE HYDROCHLORIDE 20 MG/1
TABLET ORAL
Qty: 60 TABLET | Refills: 0 | Status: SHIPPED | OUTPATIENT
Start: 2022-04-29 | End: 2022-05-29

## 2022-05-02 ENCOUNTER — MYC REFILL (OUTPATIENT)
Dept: INTERNAL MEDICINE | Facility: CLINIC | Age: 57
End: 2022-05-02
Payer: COMMERCIAL

## 2022-05-02 DIAGNOSIS — F41.1 GAD (GENERALIZED ANXIETY DISORDER): ICD-10-CM

## 2022-05-02 RX ORDER — METHYLPHENIDATE HYDROCHLORIDE 20 MG/1
20 TABLET ORAL 2 TIMES DAILY
Qty: 60 TABLET | Refills: 0 | OUTPATIENT
Start: 2022-05-02

## 2022-05-02 NOTE — TELEPHONE ENCOUNTER
Routing refill request to provider for review/approval because:  Drug not on the [facility] refill protocol - Xanax          Medication refused, Ritalin prescribed on 4/29/22.  called pharmacy and they did receive that prescription.

## 2022-05-03 RX ORDER — ALPRAZOLAM 0.5 MG
TABLET ORAL
Qty: 30 TABLET | Refills: 0 | OUTPATIENT
Start: 2022-05-03

## 2022-05-03 RX ORDER — ALPRAZOLAM 0.5 MG
0.5 TABLET ORAL DAILY PRN
Qty: 30 TABLET | Refills: 0 | Status: SHIPPED | OUTPATIENT
Start: 2022-05-03 | End: 2022-05-29

## 2022-05-29 ENCOUNTER — MYC REFILL (OUTPATIENT)
Dept: INTERNAL MEDICINE | Facility: CLINIC | Age: 57
End: 2022-05-29
Payer: COMMERCIAL

## 2022-05-29 DIAGNOSIS — F41.1 GAD (GENERALIZED ANXIETY DISORDER): ICD-10-CM

## 2022-05-29 DIAGNOSIS — F98.8 ATTENTION DEFICIT DISORDER, UNSPECIFIED HYPERACTIVITY PRESENCE: ICD-10-CM

## 2022-05-31 RX ORDER — ALPRAZOLAM 0.5 MG
0.5 TABLET ORAL DAILY PRN
Qty: 30 TABLET | Refills: 0 | Status: SHIPPED | OUTPATIENT
Start: 2022-05-31 | End: 2022-07-05

## 2022-05-31 RX ORDER — METHYLPHENIDATE HYDROCHLORIDE 20 MG/1
20 TABLET ORAL 2 TIMES DAILY
Qty: 60 TABLET | Refills: 0 | Status: SHIPPED | OUTPATIENT
Start: 2022-05-31 | End: 2022-07-05

## 2022-06-07 DIAGNOSIS — T78.40XD ALLERGIC REACTION, SUBSEQUENT ENCOUNTER: ICD-10-CM

## 2022-06-09 RX ORDER — EPINEPHRINE 0.3 MG/.3ML
INJECTION SUBCUTANEOUS
Qty: 2 EACH | Refills: 1 | Status: SHIPPED | OUTPATIENT
Start: 2022-06-09 | End: 2023-05-03

## 2022-06-09 NOTE — TELEPHONE ENCOUNTER
Routing refill request to provider for review/approval because:  Patient was a No Show for past 2 appointments

## 2022-06-11 ENCOUNTER — HEALTH MAINTENANCE LETTER (OUTPATIENT)
Age: 57
End: 2022-06-11

## 2022-06-28 ENCOUNTER — MYC REFILL (OUTPATIENT)
Dept: INTERNAL MEDICINE | Facility: CLINIC | Age: 57
End: 2022-06-28

## 2022-06-28 DIAGNOSIS — F41.1 GAD (GENERALIZED ANXIETY DISORDER): ICD-10-CM

## 2022-06-28 DIAGNOSIS — F98.8 ATTENTION DEFICIT DISORDER, UNSPECIFIED HYPERACTIVITY PRESENCE: ICD-10-CM

## 2022-06-28 DIAGNOSIS — I10 ESSENTIAL HYPERTENSION: ICD-10-CM

## 2022-06-29 NOTE — TELEPHONE ENCOUNTER
Ritalin, Lisinopril, and Xanax refill request.     Pt has 2 no shows. Last VV on 11/16/21.     Pt has appt tomorrow with Dr Earl.     Last refills for Ritalin and Xanax, 5/31/22, for #60 and #30     Routing refill request to provider for review/approval because:  Drug not on the [facility] refill protocol     BP Readings from Last 3 Encounters:   01/19/21 124/76   02/17/20 124/82   01/10/20 121/78     Creatinine   Date Value Ref Range Status   07/28/2020 0.94 0.52 - 1.04 mg/dL Final

## 2022-06-30 DIAGNOSIS — F41.1 GAD (GENERALIZED ANXIETY DISORDER): ICD-10-CM

## 2022-06-30 DIAGNOSIS — F98.8 ATTENTION DEFICIT DISORDER, UNSPECIFIED HYPERACTIVITY PRESENCE: ICD-10-CM

## 2022-06-30 RX ORDER — LISINOPRIL 20 MG/1
20 TABLET ORAL DAILY
Qty: 90 TABLET | Refills: 0 | Status: SHIPPED | OUTPATIENT
Start: 2022-06-30 | End: 2022-09-26

## 2022-06-30 RX ORDER — METHYLPHENIDATE HYDROCHLORIDE 20 MG/1
20 TABLET ORAL 2 TIMES DAILY
Qty: 60 TABLET | Refills: 0 | OUTPATIENT
Start: 2022-06-30

## 2022-06-30 RX ORDER — ALPRAZOLAM 0.5 MG
0.5 TABLET ORAL DAILY PRN
Qty: 30 TABLET | Refills: 0 | OUTPATIENT
Start: 2022-06-30

## 2022-06-30 NOTE — TELEPHONE ENCOUNTER
Patient is overdue for required 6 month follow up and has no showed to 2 appointments. This is a violation of the controlles substance agreement.  I can not refill the controlled medications. Schedule with Dr. Castellanos and he will have to discuss with her at the appointment.

## 2022-07-03 DIAGNOSIS — F41.1 GAD (GENERALIZED ANXIETY DISORDER): ICD-10-CM

## 2022-07-03 DIAGNOSIS — F98.8 ATTENTION DEFICIT DISORDER, UNSPECIFIED HYPERACTIVITY PRESENCE: ICD-10-CM

## 2022-07-05 ENCOUNTER — OFFICE VISIT (OUTPATIENT)
Dept: FAMILY MEDICINE | Facility: CLINIC | Age: 57
End: 2022-07-05
Payer: COMMERCIAL

## 2022-07-05 VITALS
RESPIRATION RATE: 22 BRPM | TEMPERATURE: 98.4 F | HEART RATE: 101 BPM | OXYGEN SATURATION: 96 % | DIASTOLIC BLOOD PRESSURE: 70 MMHG | SYSTOLIC BLOOD PRESSURE: 130 MMHG | WEIGHT: 152.7 LBS | BODY MASS INDEX: 25.44 KG/M2 | HEIGHT: 65 IN

## 2022-07-05 DIAGNOSIS — R74.8 ELEVATED LIVER ENZYMES: ICD-10-CM

## 2022-07-05 DIAGNOSIS — M15.2 BOUCHARD NODES (DJD HAND): ICD-10-CM

## 2022-07-05 DIAGNOSIS — E78.5 HYPERLIPIDEMIA LDL GOAL <130: ICD-10-CM

## 2022-07-05 DIAGNOSIS — Z11.4 SCREENING FOR HIV (HUMAN IMMUNODEFICIENCY VIRUS): ICD-10-CM

## 2022-07-05 DIAGNOSIS — F41.1 GAD (GENERALIZED ANXIETY DISORDER): ICD-10-CM

## 2022-07-05 DIAGNOSIS — Z00.00 ROUTINE GENERAL MEDICAL EXAMINATION AT A HEALTH CARE FACILITY: Primary | ICD-10-CM

## 2022-07-05 DIAGNOSIS — Z12.4 CERVICAL CANCER SCREENING: ICD-10-CM

## 2022-07-05 DIAGNOSIS — R06.83 SNORING: ICD-10-CM

## 2022-07-05 DIAGNOSIS — T14.8XXA OPEN WOUND: ICD-10-CM

## 2022-07-05 DIAGNOSIS — I10 ESSENTIAL HYPERTENSION, BENIGN: ICD-10-CM

## 2022-07-05 DIAGNOSIS — F98.8 ATTENTION DEFICIT DISORDER, UNSPECIFIED HYPERACTIVITY PRESENCE: ICD-10-CM

## 2022-07-05 DIAGNOSIS — Z13.1 SCREENING FOR DIABETES MELLITUS: ICD-10-CM

## 2022-07-05 LAB
ALBUMIN SERPL-MCNC: 4.1 G/DL (ref 3.4–5)
ALBUMIN UR-MCNC: NEGATIVE MG/DL
ALP SERPL-CCNC: 67 U/L (ref 40–150)
ALT SERPL W P-5'-P-CCNC: 105 U/L (ref 0–50)
ANION GAP SERPL CALCULATED.3IONS-SCNC: 6 MMOL/L (ref 3–14)
APPEARANCE UR: CLEAR
AST SERPL W P-5'-P-CCNC: 84 U/L (ref 0–45)
BACTERIA #/AREA URNS HPF: ABNORMAL /HPF
BILIRUB SERPL-MCNC: 0.5 MG/DL (ref 0.2–1.3)
BILIRUB UR QL STRIP: NEGATIVE
BUN SERPL-MCNC: 16 MG/DL (ref 7–30)
CALCIUM SERPL-MCNC: 9.1 MG/DL (ref 8.5–10.1)
CANNABINOIDS UR QL SCN: NORMAL
CHLORIDE BLD-SCNC: 105 MMOL/L (ref 94–109)
CHOLEST SERPL-MCNC: 248 MG/DL
CO2 SERPL-SCNC: 26 MMOL/L (ref 20–32)
COLOR UR AUTO: YELLOW
CREAT SERPL-MCNC: 0.81 MG/DL (ref 0.52–1.04)
CREAT UR-MCNC: 225 MG/DL
ERYTHROCYTE [DISTWIDTH] IN BLOOD BY AUTOMATED COUNT: 13.9 % (ref 10–15)
FASTING STATUS PATIENT QL REPORTED: YES
GFR SERPL CREATININE-BSD FRML MDRD: 86 ML/MIN/1.73M2
GLUCOSE BLD-MCNC: 106 MG/DL (ref 70–99)
GLUCOSE UR STRIP-MCNC: 100 MG/DL
HCT VFR BLD AUTO: 47.7 % (ref 35–47)
HDLC SERPL-MCNC: 72 MG/DL
HGB BLD-MCNC: 16 G/DL (ref 11.7–15.7)
HGB UR QL STRIP: NEGATIVE
KETONES UR STRIP-MCNC: NEGATIVE MG/DL
LDLC SERPL CALC-MCNC: 152 MG/DL
LEUKOCYTE ESTERASE UR QL STRIP: ABNORMAL
MCH RBC QN AUTO: 31.3 PG (ref 26.5–33)
MCHC RBC AUTO-ENTMCNC: 33.5 G/DL (ref 31.5–36.5)
MCV RBC AUTO: 93 FL (ref 78–100)
NITRATE UR QL: NEGATIVE
NONHDLC SERPL-MCNC: 176 MG/DL
PH UR STRIP: 6 [PH] (ref 5–7)
PLATELET # BLD AUTO: 291 10E3/UL (ref 150–450)
POTASSIUM BLD-SCNC: 3.7 MMOL/L (ref 3.4–5.3)
PROT SERPL-MCNC: 7.1 G/DL (ref 6.8–8.8)
RBC # BLD AUTO: 5.11 10E6/UL (ref 3.8–5.2)
RBC #/AREA URNS AUTO: ABNORMAL /HPF
SODIUM SERPL-SCNC: 137 MMOL/L (ref 133–144)
SP GR UR STRIP: 1.02 (ref 1–1.03)
SQUAMOUS #/AREA URNS AUTO: ABNORMAL /LPF
TRIGL SERPL-MCNC: 120 MG/DL
TSH SERPL DL<=0.005 MIU/L-ACNC: 1.37 MU/L (ref 0.4–4)
UROBILINOGEN UR STRIP-ACNC: 1 E.U./DL
WBC # BLD AUTO: 5.6 10E3/UL (ref 4–11)
WBC #/AREA URNS AUTO: ABNORMAL /HPF

## 2022-07-05 PROCEDURE — 83550 IRON BINDING TEST: CPT | Performed by: NURSE PRACTITIONER

## 2022-07-05 PROCEDURE — 81001 URINALYSIS AUTO W/SCOPE: CPT | Performed by: NURSE PRACTITIONER

## 2022-07-05 PROCEDURE — 87340 HEPATITIS B SURFACE AG IA: CPT | Performed by: NURSE PRACTITIONER

## 2022-07-05 PROCEDURE — 84460 ALANINE AMINO (ALT) (SGPT): CPT | Performed by: NURSE PRACTITIONER

## 2022-07-05 PROCEDURE — G0145 SCR C/V CYTO,THINLAYER,RESCR: HCPCS | Performed by: NURSE PRACTITIONER

## 2022-07-05 PROCEDURE — 36415 COLL VENOUS BLD VENIPUNCTURE: CPT | Performed by: NURSE PRACTITIONER

## 2022-07-05 PROCEDURE — 87624 HPV HI-RISK TYP POOLED RSLT: CPT | Performed by: NURSE PRACTITIONER

## 2022-07-05 PROCEDURE — 82247 BILIRUBIN TOTAL: CPT | Performed by: NURSE PRACTITIONER

## 2022-07-05 PROCEDURE — 83540 ASSAY OF IRON: CPT | Performed by: NURSE PRACTITIONER

## 2022-07-05 PROCEDURE — 87522 HEPATITIS C REVRS TRNSCRPJ: CPT | Performed by: NURSE PRACTITIONER

## 2022-07-05 PROCEDURE — 84450 TRANSFERASE (AST) (SGOT): CPT | Performed by: NURSE PRACTITIONER

## 2022-07-05 PROCEDURE — 80307 DRUG TEST PRSMV CHEM ANLYZR: CPT | Performed by: NURSE PRACTITIONER

## 2022-07-05 PROCEDURE — 87389 HIV-1 AG W/HIV-1&-2 AB AG IA: CPT | Performed by: NURSE PRACTITIONER

## 2022-07-05 PROCEDURE — 84155 ASSAY OF PROTEIN SERUM: CPT | Performed by: NURSE PRACTITIONER

## 2022-07-05 PROCEDURE — 99213 OFFICE O/P EST LOW 20 MIN: CPT | Mod: 25 | Performed by: NURSE PRACTITIONER

## 2022-07-05 PROCEDURE — 84075 ASSAY ALKALINE PHOSPHATASE: CPT | Performed by: NURSE PRACTITIONER

## 2022-07-05 PROCEDURE — 80061 LIPID PANEL: CPT | Performed by: NURSE PRACTITIONER

## 2022-07-05 PROCEDURE — 85027 COMPLETE CBC AUTOMATED: CPT | Performed by: NURSE PRACTITIONER

## 2022-07-05 PROCEDURE — 82728 ASSAY OF FERRITIN: CPT | Performed by: NURSE PRACTITIONER

## 2022-07-05 PROCEDURE — 86708 HEPATITIS A ANTIBODY: CPT | Performed by: NURSE PRACTITIONER

## 2022-07-05 PROCEDURE — 99396 PREV VISIT EST AGE 40-64: CPT | Performed by: NURSE PRACTITIONER

## 2022-07-05 PROCEDURE — 84443 ASSAY THYROID STIM HORMONE: CPT | Performed by: NURSE PRACTITIONER

## 2022-07-05 RX ORDER — METHYLPHENIDATE HYDROCHLORIDE 20 MG/1
20 TABLET ORAL 2 TIMES DAILY
Qty: 60 TABLET | Refills: 0 | Status: SHIPPED | OUTPATIENT
Start: 2022-07-05 | End: 2022-07-30

## 2022-07-05 RX ORDER — METHYLPHENIDATE HYDROCHLORIDE 20 MG/1
20 TABLET ORAL 2 TIMES DAILY
Qty: 60 TABLET | Refills: 0 | OUTPATIENT
Start: 2022-07-05

## 2022-07-05 RX ORDER — ALPRAZOLAM 0.5 MG
0.5 TABLET ORAL DAILY PRN
Qty: 30 TABLET | Refills: 0 | Status: SHIPPED | OUTPATIENT
Start: 2022-07-05 | End: 2022-07-30

## 2022-07-05 RX ORDER — SERTRALINE HYDROCHLORIDE 100 MG/1
TABLET, FILM COATED ORAL
COMMUNITY
Start: 2022-07-01 | End: 2022-07-05

## 2022-07-05 RX ORDER — ALPRAZOLAM 0.5 MG
0.5 TABLET ORAL DAILY PRN
Qty: 30 TABLET | Refills: 0 | OUTPATIENT
Start: 2022-07-05

## 2022-07-05 SDOH — ECONOMIC STABILITY: INCOME INSECURITY: IN THE LAST 12 MONTHS, WAS THERE A TIME WHEN YOU WERE NOT ABLE TO PAY THE MORTGAGE OR RENT ON TIME?: PATIENT REFUSED

## 2022-07-05 SDOH — ECONOMIC STABILITY: INCOME INSECURITY: HOW HARD IS IT FOR YOU TO PAY FOR THE VERY BASICS LIKE FOOD, HOUSING, MEDICAL CARE, AND HEATING?: SOMEWHAT HARD

## 2022-07-05 SDOH — ECONOMIC STABILITY: TRANSPORTATION INSECURITY
IN THE PAST 12 MONTHS, HAS THE LACK OF TRANSPORTATION KEPT YOU FROM MEDICAL APPOINTMENTS OR FROM GETTING MEDICATIONS?: NO

## 2022-07-05 SDOH — ECONOMIC STABILITY: TRANSPORTATION INSECURITY
IN THE PAST 12 MONTHS, HAS LACK OF TRANSPORTATION KEPT YOU FROM MEETINGS, WORK, OR FROM GETTING THINGS NEEDED FOR DAILY LIVING?: NO

## 2022-07-05 SDOH — ECONOMIC STABILITY: FOOD INSECURITY: WITHIN THE PAST 12 MONTHS, THE FOOD YOU BOUGHT JUST DIDN'T LAST AND YOU DIDN'T HAVE MONEY TO GET MORE.: SOMETIMES TRUE

## 2022-07-05 SDOH — HEALTH STABILITY: PHYSICAL HEALTH: ON AVERAGE, HOW MANY MINUTES DO YOU ENGAGE IN EXERCISE AT THIS LEVEL?: 30 MIN

## 2022-07-05 ASSESSMENT — ENCOUNTER SYMPTOMS
HEARTBURN: 0
JOINT SWELLING: 0
MYALGIAS: 0
BREAST MASS: 0
HEMATOCHEZIA: 0
COUGH: 0
PALPITATIONS: 0
CHILLS: 0
FEVER: 0
ARTHRALGIAS: 0
CONSTIPATION: 0
SORE THROAT: 0
NAUSEA: 0
DYSURIA: 0
FREQUENCY: 0
DIZZINESS: 0
WEAKNESS: 0
ABDOMINAL PAIN: 0
NERVOUS/ANXIOUS: 1
HEMATURIA: 0
SHORTNESS OF BREATH: 0
EYE PAIN: 0
PARESTHESIAS: 0
HEADACHES: 0
DIARRHEA: 0

## 2022-07-05 ASSESSMENT — SOCIAL DETERMINANTS OF HEALTH (SDOH)
IN A TYPICAL WEEK, HOW MANY TIMES DO YOU TALK ON THE PHONE WITH FAMILY, FRIENDS, OR NEIGHBORS?: THREE TIMES A WEEK
HOW OFTEN DO YOU ATTEND CHURCH OR RELIGIOUS SERVICES?: MORE THAN 4 TIMES PER YEAR
DO YOU BELONG TO ANY CLUBS OR ORGANIZATIONS SUCH AS CHURCH GROUPS UNIONS, FRATERNAL OR ATHLETIC GROUPS, OR SCHOOL GROUPS?: YES
HOW OFTEN DO YOU GET TOGETHER WITH FRIENDS OR RELATIVES?: MORE THAN THREE TIMES A WEEK

## 2022-07-05 ASSESSMENT — LIFESTYLE VARIABLES
AUDIT-C TOTAL SCORE: 4
SKIP TO QUESTIONS 9-10: 0
HOW MANY STANDARD DRINKS CONTAINING ALCOHOL DO YOU HAVE ON A TYPICAL DAY: 1 OR 2
HOW OFTEN DO YOU HAVE SIX OR MORE DRINKS ON ONE OCCASION: LESS THAN MONTHLY
HOW OFTEN DO YOU HAVE A DRINK CONTAINING ALCOHOL: 2-3 TIMES A WEEK

## 2022-07-05 NOTE — TELEPHONE ENCOUNTER
Patient is aware and states has an appiontment today with provider in [place] OTTO [place] LPN

## 2022-07-05 NOTE — PROGRESS NOTES
SUBJECTIVE:   CC: Jayleen Lang is an 56 year old woman who presents for preventive health visit.     Healthy Habits:     Getting at least 3 servings of Calcium per day:  NO    Bi-annual eye exam:  NO    Dental care twice a year:  Yes    Sleep apnea or symptoms of sleep apnea:  Excessive snoring    Diet:  Low fat/cholesterol    Frequency of exercise:  2-3 days/week    Duration of exercise:  15-30 minutes    Taking medications regularly:  Yes    PHQ-2 Total Score: 0    Additional concerns today:  Yes    ADHD Follow-Up (Adult)  Concerns: None   Ritalin 20 mg two times per day. Good benefits. No side effects.     Does have notable snoring and blood pressure concerns.   Would like to do a sleep study.     Hypertension Follow-up      Do you check your blood pressure regularly outside of the clinic? No     Are you following a low salt diet? No    Are your blood pressures ever more than 140 on the top number (systolic) OR more   than 90 on the bottom number (diastolic), for example 140/90? Does not check bp outside of clinic     Anxiety Follow-Up    How are you doing with your anxiety since your last visit? No change    Are you having other symptoms that might be associated with anxiety? No    Have you had a significant life event? No     Are you feeling depressed? No    Do you have any concerns with your use of alcohol or other drugs? No    Using alprazolam used once daily during high stress at work.       Social History     Tobacco Use     Smoking status: Never Smoker     Smokeless tobacco: Never Used   Vaping Use     Vaping Use: Never used   Substance Use Topics     Alcohol use: Yes     Comment: 2 -3  beers a week     Drug use: No     REBEKAH-7 SCORE 3/6/2019 1/6/2020 7/7/2020   Total Score - - -   Total Score 13 (moderate anxiety) - -   Total Score 13 7 9     PHQ 3/6/2019 1/6/2020 7/7/2020   PHQ-9 Total Score 5 7 6   Q9: Thoughts of better off dead/self-harm past 2 weeks Not at all Not at all Not at all           Today's  PHQ-2 Score:   PHQ-2 ( 1999 [employer]) 7/5/2022   Q1: Little interest or pleasure in doing things 0   Q2: Feeling down, depressed or hopeless 0   PHQ-2 Score 0   PHQ-2 Total Score (12-17 Years)- Positive if 3 or more points; Administer PHQ-A if positive -   Q1: Little interest or pleasure in doing things Not at all   Q2: Feeling down, depressed or hopeless Not at all   PHQ-2 Score 0       Abuse: Current or Past (Physical, Sexual or Emotional) - No  Do you feel safe in your environment? Yes        Social History     Tobacco Use     Smoking status: Never Smoker     Smokeless tobacco: Never Used   Substance Use Topics     Alcohol use: Yes     Comment: 2 -3  beers a week       Alcohol Use 7/5/2022   Prescreen: >3 drinks/day or >7 drinks/week? No   Prescreen: >3 drinks/day or >7 drinks/week? -       Reviewed orders with patient.  Reviewed health maintenance and updated orders accordingly - Yes  Lab work is in process  Labs reviewed in EPIC    Breast Cancer Screening:    FHS-7:   Breast CA Risk Assessment (FHS-7) 7/5/2022   Did any of your first-degree relatives have breast or ovarian cancer? Yes   Did any of your relatives have bilateral breast cancer? No       Mammogram Screening: Recommended annual mammography  Pertinent mammograms are reviewed under the imaging tab.    History of abnormal Pap smear: NO - age 30-65 PAP every 5 years with negative HPV co-testing recommended  PAP / HPV 8/22/2016 6/25/2014 8/20/2012   PAP (Historical) NIL NIL NIL     Reviewed and updated as needed this visit by clinical staff   Tobacco  Allergies  Meds  Problems  Med Hx  Surg Hx  Fam Hx  Soc   Hx          Reviewed and updated as needed this visit by Provider   Tobacco  Allergies  Meds  Problems  Med Hx  Surg Hx  Fam Hx           Past Medical History:   Diagnosis Date     ADD (attention deficit disorder)      Hypertension      Obesity       Past Surgical History:   Procedure Laterality Date     ARTHROPLASTY HIP  09/28/2017     "left hip replaced     ARTHROSCOPIC REPAIR POSTERIOR CRUCIATE LIGAMENT       BUNIONECTOMY Left 2018    Procedure: BUNIONECTOMY;  1.  Closing base osteotomy, first metatarsal, left foot.   2.  Akin osteotomy, left great toe. ;  Surgeon: Eliezer Parra DPM;  Location:  OR      SECTION  10/1998    x2     COLONOSCOPY  06/15/2018    Dr. So [facility]     COLONOSCOPY N/A 6/15/2018    Procedure: COLONOSCOPY;  Colonoscopy ;  Surgeon: Luis Fernando So MD;  Location:  GI     Foot surgery - bone spurs  2002     LAMINECT/DISCECTOMY, CERVICAL      C6-C7 Fusion.     OSTEOTOMY FOOT Left 2018    Procedure: OSTEOTOMY FOOT;;  Surgeon: Eliezer Parra DPM;  Location:  OR       Review of Systems   Constitutional: Negative for chills and fever.   HENT: Negative for congestion, ear pain, hearing loss and sore throat.    Eyes: Negative for pain and visual disturbance.   Respiratory: Negative for cough and shortness of breath.    Cardiovascular: Negative for chest pain, palpitations and peripheral edema.   Gastrointestinal: Negative for abdominal pain, constipation, diarrhea, heartburn, hematochezia and nausea.   Breasts:  Negative for tenderness, breast mass and discharge.   Genitourinary: Negative for dysuria, frequency, genital sores, hematuria, pelvic pain, urgency, vaginal bleeding and vaginal discharge.   Musculoskeletal: Negative for arthralgias, joint swelling and myalgias.   Skin: Negative for rash.   Neurological: Negative for dizziness, weakness, headaches and paresthesias.   Psychiatric/Behavioral: Negative for mood changes. The patient is nervous/anxious.           OBJECTIVE:   /70 (BP Location: Right arm, Patient Position: Sitting, Cuff Size: Adult Regular)   Pulse 101   Temp 98.4  F (36.9  C) (Oral)   Resp 22   Ht 1.651 m (5' 5\")   Wt 69.3 kg (152 lb 11.2 oz)   LMP  (LMP Unknown)   SpO2 96%   BMI 25.41 kg/m    Physical Exam  GENERAL: healthy, alert and no distress  EYES: Eyes grossly " normal to inspection, PERRL and conjunctivae and sclerae normal  HENT: ear canals and TM's normal, nose and mouth without ulcers or lesions  NECK: no adenopathy, no asymmetry, masses, or scars and thyroid normal to palpation  RESP: lungs clear to auscultation - no rales, rhonchi or wheezes  BREAST: normal without masses, tenderness or nipple discharge and no palpable axillary masses or adenopathy  CV: regular rate and rhythm, normal S1 S2, no S3 or S4, no murmur, click or rub, no peripheral edema and peripheral pulses strong  ABDOMEN: soft, nontender, no hepatosplenomegaly, no masses and bowel sounds normal   (female): normal female external genitalia, normal urethral meatus, vaginal mucosa pink, moist, well rugated, and normal cervix/adnexa/uterus without masses or discharge  MS: no gross musculoskeletal defects noted, no edema. Left index finger with kelly node.   SKIN: right upper thigh with clean wound with no scab. Appropriate surrounding redness, no discharge or warmth.   NEURO: Normal strength and tone, mentation intact and speech normal  PSYCH: mentation appears normal, affect normal/bright    Diagnostic Test Results:  Labs reviewed in Epic    ASSESSMENT/PLAN:   Jayleen was seen today for physical.    Diagnoses and all orders for this visit:    Routine general medical examination at a health care facility  Declines zoster and COVID vaccines.     Open wound  Clean and dry. Recommended to prevent picking of scabs. No signs of infection. Monitor.     Kelly nodes (DJD hand)  Discussed etiology. Monitor.    REBEKAH (generalized anxiety disorder)  -     [id number] - Urine Drug Confirmation Panel (Comprehensive); Future  -     ALPRAZolam (XANAX) 0.5 MG tablet; Take 1 tablet (0.5 mg) by mouth daily as needed for anxiety  -     TSH with free T4 reflex; Future  Controlled.   Consider better control of anxiety with daily maintenance medication.   Refilled alprazolam. Further refills per PCP.    record reviewed and is  "without red flags for controlled substance activity.      Attention deficit disorder, unspecified hyperactivity presence  -     [id number] - Urine Drug Confirmation Panel (Comprehensive); Future  -     methylphenidate (RITALIN) 20 MG tablet; Take 1 tablet (20 mg) by mouth 2 times daily  Controlled.   Refilled. Further refills per PCP.     Hyperlipidemia LDL goal <130  -     Lipid panel reflex to direct LDL Non-fasting; Future    Essential hypertension, benign  -     Comprehensive metabolic panel (BMP + Alb, Alk Phos, ALT, AST, Total. Bili, TP); Future  Controlled. Continued lisinopril.     Snoring  -     CBC with platelets; Future  -     Adult Sleep Eval & Management  Referral; Future  Consult sleep clinic.     Cervical cancer screening  -     Pap Screen with HPV - recommended age 30 - 65 years    Screening for HIV (human immunodeficiency virus)  -     HIV Antigen Antibody Combo; Future    Screening for diabetes mellitus  -     Comprehensive metabolic panel (BMP + Alb, Alk Phos, ALT, AST, Total. Bili, TP); Future    Other orders  -     REVIEW OF HEALTH MAINTENANCE PROTOCOL ORDERS  -     UA with Microscopic reflex to Culture - lab collect  -     Urine Microscopic        Patient has been advised of split billing requirements and indicates understanding: Yes    COUNSELING:  Reviewed preventive health counseling, as reflected in patient instructions    Estimated body mass index is 25.41 kg/m  as calculated from the following:    Height as of this encounter: 1.651 m (5' 5\").    Weight as of this encounter: 69.3 kg (152 lb 11.2 oz).    Weight management plan: Discussed healthy diet and exercise guidelines    She reports that she has never smoked. She has never used smokeless tobacco.      Counseling Resources:  ATP IV Guidelines  Pooled Cohorts Equation Calculator  Breast Cancer Risk Calculator  BRCA-Related Cancer Risk Assessment: FHS-7 Tool  FRAX Risk Assessment  ICSI Preventive Guidelines  Dietary Guidelines for " Americans, 2010  USDA's MyPlate  ASA Prophylaxis  Lung CA Screening    SPENCER Myers CNP  [facility]

## 2022-07-06 LAB
ALBUMIN SERPL BCG-MCNC: 4.7 G/DL (ref 3.5–5.2)
ALP SERPL-CCNC: 66 U/L (ref 35–104)
ALT SERPL W P-5'-P-CCNC: 106 U/L (ref 10–35)
ANION GAP SERPL CALCULATED.3IONS-SCNC: 20 MMOL/L (ref 7–15)
AST SERPL W P-5'-P-CCNC: 86 U/L (ref 10–35)
BILIRUB SERPL-MCNC: 0.4 MG/DL
BUN SERPL-MCNC: 16.2 MG/DL (ref 6–20)
CALCIUM SERPL-MCNC: 9.7 MG/DL (ref 8.6–10)
CHLORIDE SERPL-SCNC: 102 MMOL/L (ref 98–107)
CREAT SERPL-MCNC: 0.9 MG/DL (ref 0.51–0.95)
DEPRECATED HCO3 PLAS-SCNC: 20 MMOL/L (ref 22–29)
FERRITIN SERPL-MCNC: 161 NG/ML (ref 11–328)
GFR SERPL CREATININE-BSD FRML MDRD: 76 ML/MIN/1.73M2
GLUCOSE SERPL-MCNC: 103 MG/DL (ref 70–99)
HAV IGG SER QL IA: NONREACTIVE
HBV SURFACE AG SERPL QL IA: NONREACTIVE
HIV 1+2 AB+HIV1 P24 AG SERPL QL IA: NONREACTIVE
IRON BINDING CAPACITY (ROCHE): 359 UG/DL (ref 240–430)
IRON SATN MFR SERPL: 38 % (ref 15–46)
IRON SERPL-MCNC: 136 UG/DL (ref 37–145)
POTASSIUM SERPL-SCNC: 4.2 MMOL/L (ref 3.4–5.3)
PROT SERPL-MCNC: 6.8 G/DL (ref 6.4–8.3)
SODIUM SERPL-SCNC: 142 MMOL/L (ref 136–145)

## 2022-07-07 LAB
AMPHET UR CFM-MCNC: ABNORMAL NG/ML
AMPHET/CREAT UR: ABNORMAL
GABAPENTIN UR QL CFM: PRESENT
HCV RNA SERPL NAA+PROBE-ACNC: NOT DETECTED IU/ML
ME-PHENIDATE UR CFM-MCNC: 4480 NG/ML
ME-PHENIDATE/CREAT UR: 1991 NG/MG {CREAT}

## 2022-07-08 LAB
BKR LAB AP GYN ADEQUACY: NORMAL
BKR LAB AP GYN INTERPRETATION: NORMAL
BKR LAB AP HPV REFLEX: NORMAL
BKR LAB AP PREVIOUS ABNORMAL: NORMAL
PATH REPORT.COMMENTS IMP SPEC: NORMAL
PATH REPORT.COMMENTS IMP SPEC: NORMAL
PATH REPORT.RELEVANT HX SPEC: NORMAL

## 2022-07-11 LAB
HUMAN PAPILLOMA VIRUS 16 DNA: NEGATIVE
HUMAN PAPILLOMA VIRUS 18 DNA: NEGATIVE
HUMAN PAPILLOMA VIRUS FINAL DIAGNOSIS: NORMAL
HUMAN PAPILLOMA VIRUS OTHER HR: NEGATIVE

## 2022-08-07 ENCOUNTER — MYC MEDICAL ADVICE (OUTPATIENT)
Dept: INTERNAL MEDICINE | Facility: CLINIC | Age: 57
End: 2022-08-07

## 2022-08-07 ENCOUNTER — MYC MEDICAL ADVICE (OUTPATIENT)
Dept: FAMILY MEDICINE | Facility: CLINIC | Age: 57
End: 2022-08-07

## 2022-08-08 NOTE — TELEPHONE ENCOUNTER
Sheila Garcia APRN CNP      Please review my chart message - forward onto management if you would like (RN did not forward)     Thank you,   Brynn Anthony, Registered Nurse  [facility]

## 2022-08-17 NOTE — ED TRIAGE NOTES
Here left ear pain and pressure started 2 weeks ago associated lightheadedness. ABCs intact.  
2 = A lot of assistance

## 2022-08-29 DIAGNOSIS — F98.8 ATTENTION DEFICIT DISORDER, UNSPECIFIED HYPERACTIVITY PRESENCE: ICD-10-CM

## 2022-08-30 ENCOUNTER — MYC MEDICAL ADVICE (OUTPATIENT)
Dept: INTERNAL MEDICINE | Facility: CLINIC | Age: 57
End: 2022-08-30

## 2022-08-30 DIAGNOSIS — F98.8 ATTENTION DEFICIT DISORDER, UNSPECIFIED HYPERACTIVITY PRESENCE: ICD-10-CM

## 2022-08-30 RX ORDER — METHYLPHENIDATE HYDROCHLORIDE 20 MG/1
TABLET ORAL
Qty: 60 TABLET | Refills: 0 | OUTPATIENT
Start: 2022-08-30

## 2022-08-30 RX ORDER — METHYLPHENIDATE HYDROCHLORIDE 20 MG/1
20 TABLET ORAL 2 TIMES DAILY
Qty: 60 TABLET | Refills: 0 | Status: SHIPPED | OUTPATIENT
Start: 2022-08-30 | End: 2022-09-26

## 2022-08-30 NOTE — TELEPHONE ENCOUNTER
Patient calls back again and is leaving for vacation at 5:30 pm today and needs RX to take with please.

## 2022-09-01 ENCOUNTER — MYC REFILL (OUTPATIENT)
Dept: FAMILY MEDICINE | Facility: CLINIC | Age: 57
End: 2022-09-01

## 2022-09-01 DIAGNOSIS — F41.1 GAD (GENERALIZED ANXIETY DISORDER): ICD-10-CM

## 2022-09-02 RX ORDER — ALPRAZOLAM 0.5 MG
0.5 TABLET ORAL DAILY PRN
Qty: 15 TABLET | Refills: 0 | Status: SHIPPED | OUTPATIENT
Start: 2022-09-02 | End: 2022-09-26

## 2022-09-26 ENCOUNTER — MYC REFILL (OUTPATIENT)
Dept: INTERNAL MEDICINE | Facility: CLINIC | Age: 57
End: 2022-09-26

## 2022-09-26 ENCOUNTER — MYC MEDICAL ADVICE (OUTPATIENT)
Dept: INTERNAL MEDICINE | Facility: CLINIC | Age: 57
End: 2022-09-26

## 2022-09-26 DIAGNOSIS — I10 ESSENTIAL HYPERTENSION: ICD-10-CM

## 2022-09-26 DIAGNOSIS — F41.1 GAD (GENERALIZED ANXIETY DISORDER): ICD-10-CM

## 2022-09-26 DIAGNOSIS — F98.8 ATTENTION DEFICIT DISORDER, UNSPECIFIED HYPERACTIVITY PRESENCE: ICD-10-CM

## 2022-09-26 RX ORDER — LISINOPRIL 20 MG/1
20 TABLET ORAL DAILY
Qty: 90 TABLET | Refills: 0 | Status: SHIPPED | OUTPATIENT
Start: 2022-09-26 | End: 2022-12-23

## 2022-09-26 RX ORDER — METHYLPHENIDATE HYDROCHLORIDE 20 MG/1
20 TABLET ORAL 2 TIMES DAILY
Qty: 60 TABLET | Refills: 0 | Status: SHIPPED | OUTPATIENT
Start: 2022-09-26 | End: 2022-10-22

## 2022-09-26 RX ORDER — ALPRAZOLAM 0.5 MG
0.5 TABLET ORAL DAILY PRN
Qty: 15 TABLET | Refills: 0 | Status: SHIPPED | OUTPATIENT
Start: 2022-09-26 | End: 2022-11-02

## 2022-09-26 NOTE — TELEPHONE ENCOUNTER
Routing refill request to provider for review/approval because:  Patient has no showed past 2 appointments (with Miquel) and last office visit was 11/16/21-virtual     Per the above visit, patient was to return around 12/14/21 for physical.      Myc message sent to patient.

## 2022-10-13 DIAGNOSIS — G62.9 NEUROPATHY: ICD-10-CM

## 2022-10-13 RX ORDER — GABAPENTIN 600 MG/1
600 TABLET ORAL 3 TIMES DAILY
Qty: 270 TABLET | Refills: 0 | Status: SHIPPED | OUTPATIENT
Start: 2022-10-13 | End: 2023-05-10

## 2022-10-13 NOTE — TELEPHONE ENCOUNTER
Chhaya from [facility] pharmacy calling for refill on:    Gabapentin 600mg      Last Written Prescription Date:  8-10-21  Last Fill Quantity: 270,   # refills: 0  Last Office Visit: 7/5/22 @ [facility]  Future Office visit:       No showed for appointments at [facility] - 6/8/22, 4/21/22    Routing refill request to provider for review/approval because:  Drug not on the [facility], [facility] or  Health refill protocol or controlled substance    Please advise, thanks.  Routed to [facility] provider.

## 2022-10-22 ENCOUNTER — HEALTH MAINTENANCE LETTER (OUTPATIENT)
Age: 57
End: 2022-10-22

## 2022-10-22 ENCOUNTER — MYC REFILL (OUTPATIENT)
Dept: INTERNAL MEDICINE | Facility: CLINIC | Age: 57
End: 2022-10-22

## 2022-10-22 DIAGNOSIS — F98.8 ATTENTION DEFICIT DISORDER, UNSPECIFIED HYPERACTIVITY PRESENCE: ICD-10-CM

## 2022-10-24 RX ORDER — METHYLPHENIDATE HYDROCHLORIDE 20 MG/1
20 TABLET ORAL 2 TIMES DAILY
Qty: 60 TABLET | Refills: 0 | Status: SHIPPED | OUTPATIENT
Start: 2022-10-24 | End: 2022-11-22

## 2022-11-02 ENCOUNTER — MYC REFILL (OUTPATIENT)
Dept: INTERNAL MEDICINE | Facility: CLINIC | Age: 57
End: 2022-11-02

## 2022-11-02 DIAGNOSIS — F41.1 GAD (GENERALIZED ANXIETY DISORDER): ICD-10-CM

## 2022-11-02 RX ORDER — ALPRAZOLAM 0.5 MG
0.5 TABLET ORAL DAILY PRN
Qty: 15 TABLET | Refills: 0 | Status: CANCELLED | OUTPATIENT
Start: 2022-11-02

## 2022-11-02 RX ORDER — ALPRAZOLAM 0.5 MG
0.5 TABLET ORAL DAILY PRN
Qty: 15 TABLET | Refills: 0 | Status: SHIPPED | OUTPATIENT
Start: 2022-11-02 | End: 2022-11-30

## 2022-11-03 NOTE — TELEPHONE ENCOUNTER
Duplicate request.     Mira Davila RN  [facility]     Patient here for RIGHT ear pain and fullness x 2-3 weeks.   Has been having fevers 99-100F at home.   Treating with tylenol with ok control .      Concern for infection and feeling that needs to be \"popped\"    Denies drainage from the ear.     No recent travel

## 2022-11-09 ENCOUNTER — TELEPHONE (OUTPATIENT)
Dept: INTERNAL MEDICINE | Facility: CLINIC | Age: 57
End: 2022-11-09

## 2022-11-09 NOTE — TELEPHONE ENCOUNTER
Call to pt and advised. She agrees with this. Informed her that we do not have the tools at the clinic to cut her ring off.

## 2022-11-09 NOTE — TELEPHONE ENCOUNTER
Reason for Call:  Appointment Request    Patient requesting this type of appt:  PT's finger is infected, needs her ring cut off and seen asap     Requested provider: open to any clinic, DR or Nurse    Reason patient unable to be scheduled: Not with their preferred provider    When does patient want to be seen/preferred time: 1-2 days    Comments: PT had to go     Could we send this information to you in [facility] or would you prefer to receive a phone call?:   Patient would prefer a phone call   Okay to leave a detailed message?: Yes at Cell number on file:    Telephone Information:   Mobile [phone]       Call taken on 11/9/2022 at 12:16 PM by Shaniqua Turner

## 2022-11-22 ENCOUNTER — MYC REFILL (OUTPATIENT)
Dept: INTERNAL MEDICINE | Facility: CLINIC | Age: 57
End: 2022-11-22

## 2022-11-22 DIAGNOSIS — F98.8 ATTENTION DEFICIT DISORDER, UNSPECIFIED HYPERACTIVITY PRESENCE: ICD-10-CM

## 2022-11-22 RX ORDER — METHYLPHENIDATE HYDROCHLORIDE 20 MG/1
20 TABLET ORAL 2 TIMES DAILY
Qty: 60 TABLET | Refills: 0 | Status: SHIPPED | OUTPATIENT
Start: 2022-11-22 | End: 2022-12-23

## 2022-11-22 NOTE — TELEPHONE ENCOUNTER
Pending Prescriptions:                       Disp   Refills    methylphenidate (RITALIN) 20 MG tablet     60 tab*0        Sig: Take 1 tablet (20 mg) by mouth 2 times daily  Routing refill request to provider for review/approval because:  Drug not on the [facility] refill protocol   Last ov 11/18/2022    '

## 2022-11-30 ENCOUNTER — MYC REFILL (OUTPATIENT)
Dept: FAMILY MEDICINE | Facility: CLINIC | Age: 57
End: 2022-11-30

## 2022-11-30 DIAGNOSIS — F41.1 GAD (GENERALIZED ANXIETY DISORDER): ICD-10-CM

## 2022-12-02 ENCOUNTER — VIRTUAL VISIT (OUTPATIENT)
Dept: PEDIATRICS | Facility: CLINIC | Age: 57
End: 2022-12-02
Payer: COMMERCIAL

## 2022-12-02 DIAGNOSIS — J02.9 ACUTE PHARYNGITIS, UNSPECIFIED ETIOLOGY: Primary | ICD-10-CM

## 2022-12-02 PROCEDURE — 99213 OFFICE O/P EST LOW 20 MIN: CPT | Mod: GT | Performed by: INTERNAL MEDICINE

## 2022-12-02 NOTE — Clinical Note
Please call asap - needs nurse only today for covid/flu/strep testing (had video visit with me).  I also signed work note - please help guide her to find this so she can print and give to employer.  Thank you!

## 2022-12-02 NOTE — PROGRESS NOTES
"Jayleen is a 57 year old who is being evaluated via a billable video visit.      Assessment & Plan       ICD-10-CM    1. Pharyngitis with cough and fever  J02.9     .  Will check covid/flu/strep.  She is outside of tx window at this time.  Return to work precautions provided and work note.        Home/expectant care and guidance on when/if she needs to come in for further eval.       See Patient Instructions    Return in about 3 days (around 12/5/2022), or if symptoms worsen or fail to improve.    Sylvia Lopez MD  [facility] ASHTYN Clemens is a 57 year old, presenting for the following health issues:  No chief complaint on file.      History of Present Illness       Headaches:   Since the patient's last clinic visit, headaches are: no change  The patient is getting headaches:  Never, just past 3 days not a migraine  She is able to do normal daily activities when she has a migraine.  The patient is taking the following rescue/relief medications:  Other   Patient states \"The relief is inconsistent\" from the rescue/relief medications.   The patient is taking the following medications to prevent migraines:  No medications to prevent migraines  In the past 4 weeks, the patient has gone to an Urgent Care or Emergency Room 0 times times due to headaches.    Reason for visit:  I have had a headache, low grade temp for the last 3 days, and now its 101, racing heart rate  Symptom onset:  3-7 days ago  Symptoms include:  Headache, low grade fever (The last couple of days but tonight it s 101), and my heart is racing at times. I have not excersized in 5 days.  Symptom intensity:  Moderate  Symptom progression:  Staying the same  Had these symptoms before:  No  What makes it worse:  No  What makes it better:  Water and sleep    She eats 4 or more servings of fruits and vegetables daily.She consumes 0 sweetened beverage(s) daily.She exercises with enough effort to increase her heart rate " 30 to 60 minutes per day.  She exercises with enough effort to increase her heart rate 5 days per week.   She is taking medications regularly.     Bad sore throat.  Couldn't get an in-visit (none available).  Works at a school.  Feels like sometimes heart is racing.  This is not normal.  Palpitations.  Last couple of days.  Stayed home from school.  Low grade temps 100-101.  COVID test at school on Wed.  (Rapid home).    Day 1 of illness was Sat-Sun with sore throat.  Worse at night with a cough.  Fever started Wed.    No SOB  HR is 92 at rest (checked today)        Review of Systems   All other systems on a 10-point review are negative, unless otherwise noted in HPI        Objective           Vitals:  HR 92 at rest    Physical Exam   GENERAL: Healthy, alert and no distress  EYES: Eyes grossly normal to inspection.  No discharge or erythema, or obvious scleral/conjunctival abnormalities.  RESP: No audible wheeze, cough, or visible cyanosis.  No visible retractions or increased work of breathing.    SKIN: Visible skin clear. No significant rash, abnormal pigmentation or lesions.  NEURO: Cranial nerves grossly intact.  Mentation and speech appropriate for age.  PSYCH: Mentation appears normal, affect normal/bright, judgement and insight intact, normal speech and appearance well-groomed.            Video-Visit Details    Video Start Time: 9:58 AM    Type of service:  Video Visit    Video End Time:10:12 AM    Originating Location (pt. Location): Home    Distant Location (provider location):  Off-site    Platform used for Video Visit: M-[employer]

## 2022-12-02 NOTE — PROGRESS NOTES
Emailed letter to patient. Left her a message also.  Answers for HPI/ROS submitted by the patient on 12/1/2022  Headache Symptoms are: no change  How often are you getting headaches or migraines? : Never, just past 3 days not a migraine  Are you able to do normal daily activities when you have a migraine?: Yes  Migraine Rescue/Relief Medications:: other  Effectiveness of rescue/relief medications:: The relief is inconsistent  Migraine Preventative Medications:: no medications to prevent migraines  ER or UC Visits:: 0 times  How many servings of fruits and vegetables do you eat daily?: 4 or more  On average, how many sweetened beverages do you drink each day (Examples: soda, juice, sweet tea, etc.  Do NOT count diet or artificially sweetened beverages)?: 0  How many minutes a day do you exercise enough to make your heart beat faster?: 30 to 60  How many days a week do you exercise enough to make your heart beat faster?: 5  How many days per week do you miss taking your medication?: 0  What is the reason for your visit today?: I have had a headache, low grade temp for the last 3 days, and now its 101, racing heart rate  When did your symptoms begin?: 3-7 days ago  What are your symptoms?: Headache, low grade fever (The last couple of days but tonight it s 101), and my heart is racing at times. I have not excersized in 5 days.  How would you describe these symptoms?: Moderate  Are your symptoms:: Staying the same  Have you had these symptoms before?: No  Is there anything that makes you feel worse?: No  Is there anything that makes you feel better?: Water and sleep

## 2022-12-02 NOTE — LETTER
December 2, 2022      Jayleen Lang  [address] [address] [address]        To Whom It May Concern:    Jayleen Lang  was seen on 12/2 due to illness.  Please excuse her for days missed on 12/1 and 12/2 due to illness.  She can return to work as early as 12/5, as long as she has been afebrile for 24 hours and is clinically improved.        Sincerely,        Sylvia Lopez MD

## 2022-12-04 NOTE — TELEPHONE ENCOUNTER
Pending Prescriptions:                       Disp   Refills    ALPRAZolam (XANAX) 0.5 MG tablet           15 tab*0        Sig: Take 1 tablet (0.5 mg) by mouth daily as needed for           anxiety    Routing refill request to provider for review/approval because:  Drug not on the [facility] refill protocol

## 2022-12-05 RX ORDER — ALPRAZOLAM 0.5 MG
0.5 TABLET ORAL DAILY PRN
Qty: 15 TABLET | Refills: 0 | Status: SHIPPED | OUTPATIENT
Start: 2022-12-05 | End: 2023-01-04

## 2022-12-22 ENCOUNTER — MYC REFILL (OUTPATIENT)
Dept: INTERNAL MEDICINE | Facility: CLINIC | Age: 57
End: 2022-12-22

## 2022-12-22 DIAGNOSIS — I10 ESSENTIAL HYPERTENSION: ICD-10-CM

## 2022-12-22 DIAGNOSIS — F98.8 ATTENTION DEFICIT DISORDER, UNSPECIFIED HYPERACTIVITY PRESENCE: ICD-10-CM

## 2022-12-23 ENCOUNTER — MYC MEDICAL ADVICE (OUTPATIENT)
Dept: INTERNAL MEDICINE | Facility: CLINIC | Age: 57
End: 2022-12-23

## 2022-12-23 DIAGNOSIS — T78.40XD ALLERGIC REACTION, SUBSEQUENT ENCOUNTER: ICD-10-CM

## 2022-12-23 RX ORDER — METHYLPHENIDATE HYDROCHLORIDE 20 MG/1
20 TABLET ORAL 2 TIMES DAILY
Qty: 60 TABLET | Refills: 0 | OUTPATIENT
Start: 2022-12-23

## 2022-12-23 RX ORDER — LISINOPRIL 20 MG/1
TABLET ORAL
Qty: 90 TABLET | Refills: 0 | Status: SHIPPED | OUTPATIENT
Start: 2022-12-23 | End: 2023-01-01

## 2022-12-23 RX ORDER — METHYLPHENIDATE HYDROCHLORIDE 20 MG/1
TABLET ORAL
Qty: 60 TABLET | Refills: 0 | Status: SHIPPED | OUTPATIENT
Start: 2022-12-23 | End: 2023-01-24

## 2022-12-23 NOTE — TELEPHONE ENCOUNTER
Patient called regarding the prescription.  Provider sent prescription today.  Patient stated pharmacy doesn't have the prescription.    Called pharmacy and they stated the refill just came up in their system.  They will begin getting it ready.    Called patient and let her know.  She verbalized understanding and appreciation.

## 2022-12-23 NOTE — TELEPHONE ENCOUNTER
Pt is calling back today. She thought she called this in a couple days ago but advised her this came in yesterday at noon (and the 72 hour request time). She is hoping someone will walk this back to see if provider can fill.    Transferred to RN

## 2023-01-01 ENCOUNTER — MYC REFILL (OUTPATIENT)
Dept: INTERNAL MEDICINE | Facility: CLINIC | Age: 58
End: 2023-01-01

## 2023-01-01 DIAGNOSIS — I10 ESSENTIAL HYPERTENSION: ICD-10-CM

## 2023-01-03 RX ORDER — LISINOPRIL 20 MG/1
20 TABLET ORAL DAILY
Qty: 90 TABLET | Refills: 0 | Status: SHIPPED | OUTPATIENT
Start: 2022-12-23 | End: 2023-03-17

## 2023-01-04 ENCOUNTER — MYC REFILL (OUTPATIENT)
Dept: FAMILY MEDICINE | Facility: CLINIC | Age: 58
End: 2023-01-04

## 2023-01-04 DIAGNOSIS — F41.1 GAD (GENERALIZED ANXIETY DISORDER): ICD-10-CM

## 2023-01-04 NOTE — TELEPHONE ENCOUNTER
Routing refill request to provider for review/approval because:  Drug not on the [facility] refill protocol      Appointments in Next Year      Jan 18, 2023  7:00 AM  (Arrive by 6:45 AM)  Provider Visit with New Castellanos MD  [facility] ([facility] ) [phone]        Routed to covering provider - Dr. Smith, to review.     Mira Davila RN  [facility]

## 2023-01-05 RX ORDER — ALPRAZOLAM 0.5 MG
0.5 TABLET ORAL DAILY PRN
Qty: 15 TABLET | Refills: 0 | Status: SHIPPED | OUTPATIENT
Start: 2023-01-05 | End: 2023-01-30

## 2023-01-20 ENCOUNTER — MYC REFILL (OUTPATIENT)
Dept: INTERNAL MEDICINE | Facility: CLINIC | Age: 58
End: 2023-01-20
Payer: COMMERCIAL

## 2023-01-20 DIAGNOSIS — F98.8 ATTENTION DEFICIT DISORDER, UNSPECIFIED HYPERACTIVITY PRESENCE: ICD-10-CM

## 2023-01-24 DIAGNOSIS — F98.8 ATTENTION DEFICIT DISORDER, UNSPECIFIED HYPERACTIVITY PRESENCE: ICD-10-CM

## 2023-01-24 RX ORDER — METHYLPHENIDATE HYDROCHLORIDE 20 MG/1
TABLET ORAL
Qty: 60 TABLET | Refills: 0 | Status: SHIPPED | OUTPATIENT
Start: 2023-01-24 | End: 2023-02-20

## 2023-01-25 RX ORDER — METHYLPHENIDATE HYDROCHLORIDE 20 MG/1
20 TABLET ORAL 2 TIMES DAILY
Qty: 60 TABLET | Refills: 0 | OUTPATIENT
Start: 2023-01-25

## 2023-01-30 ENCOUNTER — MYC REFILL (OUTPATIENT)
Dept: FAMILY MEDICINE | Facility: CLINIC | Age: 58
End: 2023-01-30
Payer: COMMERCIAL

## 2023-01-30 DIAGNOSIS — F41.1 GAD (GENERALIZED ANXIETY DISORDER): ICD-10-CM

## 2023-02-01 RX ORDER — ALPRAZOLAM 0.5 MG
0.5 TABLET ORAL DAILY PRN
Qty: 15 TABLET | Refills: 0 | Status: SHIPPED | OUTPATIENT
Start: 2023-02-01 | End: 2023-02-28

## 2023-02-01 NOTE — TELEPHONE ENCOUNTER
Alprazolam (Xanax) 0.5 mg tab  Routing refill request to provider for review/approval because:  Drug not on the [facility] refill protocol     LOV 11/2022  Appointments in Next Year      Mar 01, 2023  7:30 AM  (Arrive by 7:10 AM)  Provider Visit with New Castellanos MD  [facility] ([facility] ) [phone]

## 2023-02-20 ENCOUNTER — MYC REFILL (OUTPATIENT)
Dept: INTERNAL MEDICINE | Facility: CLINIC | Age: 58
End: 2023-02-20
Payer: COMMERCIAL

## 2023-02-20 DIAGNOSIS — F98.8 ATTENTION DEFICIT DISORDER, UNSPECIFIED HYPERACTIVITY PRESENCE: ICD-10-CM

## 2023-02-22 RX ORDER — METHYLPHENIDATE HYDROCHLORIDE 20 MG/1
20 TABLET ORAL 2 TIMES DAILY
Qty: 60 TABLET | Refills: 0 | Status: SHIPPED | OUTPATIENT
Start: 2023-02-22 | End: 2023-03-17

## 2023-02-28 ENCOUNTER — TELEPHONE (OUTPATIENT)
Dept: INTERNAL MEDICINE | Facility: CLINIC | Age: 58
End: 2023-02-28
Payer: COMMERCIAL

## 2023-02-28 ENCOUNTER — MYC REFILL (OUTPATIENT)
Dept: FAMILY MEDICINE | Facility: CLINIC | Age: 58
End: 2023-02-28
Payer: COMMERCIAL

## 2023-02-28 DIAGNOSIS — F41.1 GAD (GENERALIZED ANXIETY DISORDER): ICD-10-CM

## 2023-02-28 RX ORDER — ALPRAZOLAM 0.5 MG
0.5 TABLET ORAL DAILY PRN
Qty: 15 TABLET | Refills: 0 | Status: SHIPPED | OUTPATIENT
Start: 2023-02-28 | End: 2023-03-27

## 2023-02-28 NOTE — TELEPHONE ENCOUNTER
Patient has to reschedule tomorrow's appt.  Please place lab orders prior to next visit.    Next 5 appointments (look out 90 days)    Mar 27, 2023  1:00 PM  (Arrive by 12:40 PM)  Provider Visit with New Castellanos MD  [facility] ([facility] ) [address]  [address]  [place] [address] [phone]          Freda Ornelas CMA  [facility] [place]  [phone]

## 2023-03-01 NOTE — TELEPHONE ENCOUNTER
Call to pt and left detailed message that Dr Castellanos will see pt first, then order lab work.   (pt has many no shows, cancel appt)

## 2023-03-17 ENCOUNTER — MYC REFILL (OUTPATIENT)
Dept: INTERNAL MEDICINE | Facility: CLINIC | Age: 58
End: 2023-03-17
Payer: COMMERCIAL

## 2023-03-17 DIAGNOSIS — I10 ESSENTIAL HYPERTENSION: ICD-10-CM

## 2023-03-17 DIAGNOSIS — F98.8 ATTENTION DEFICIT DISORDER, UNSPECIFIED HYPERACTIVITY PRESENCE: ICD-10-CM

## 2023-03-17 NOTE — TELEPHONE ENCOUNTER
Pt has upcoming appt.     Last Ritalin refill on 2/22/23 for #60    Routing refill request to provider for review/approval because:  Drug not on the [facility] refill protocol

## 2023-03-20 RX ORDER — METHYLPHENIDATE HYDROCHLORIDE 20 MG/1
20 TABLET ORAL 2 TIMES DAILY
Qty: 60 TABLET | Refills: 0 | Status: SHIPPED | OUTPATIENT
Start: 2023-03-20 | End: 2023-04-13

## 2023-03-20 RX ORDER — LISINOPRIL 20 MG/1
20 TABLET ORAL DAILY
Qty: 90 TABLET | Refills: 0 | Status: SHIPPED | OUTPATIENT
Start: 2023-03-20 | End: 2023-08-11

## 2023-03-27 ENCOUNTER — OFFICE VISIT (OUTPATIENT)
Dept: INTERNAL MEDICINE | Facility: CLINIC | Age: 58
End: 2023-03-27
Payer: COMMERCIAL

## 2023-03-27 VITALS
HEART RATE: 133 BPM | RESPIRATION RATE: 18 BRPM | SYSTOLIC BLOOD PRESSURE: 129 MMHG | TEMPERATURE: 98.4 F | HEIGHT: 65 IN | WEIGHT: 159.8 LBS | DIASTOLIC BLOOD PRESSURE: 86 MMHG | BODY MASS INDEX: 26.62 KG/M2 | OXYGEN SATURATION: 95 %

## 2023-03-27 DIAGNOSIS — F41.1 GAD (GENERALIZED ANXIETY DISORDER): ICD-10-CM

## 2023-03-27 DIAGNOSIS — Z12.31 ENCOUNTER FOR SCREENING MAMMOGRAM FOR BREAST CANCER: ICD-10-CM

## 2023-03-27 DIAGNOSIS — Z80.0 FAMILY HISTORY OF COLON CANCER: ICD-10-CM

## 2023-03-27 DIAGNOSIS — F98.8 ATTENTION DEFICIT DISORDER, UNSPECIFIED HYPERACTIVITY PRESENCE: ICD-10-CM

## 2023-03-27 DIAGNOSIS — Z12.11 COLON CANCER SCREENING: ICD-10-CM

## 2023-03-27 DIAGNOSIS — I10 ESSENTIAL HYPERTENSION, BENIGN: ICD-10-CM

## 2023-03-27 DIAGNOSIS — Z78.0 MENOPAUSE: ICD-10-CM

## 2023-03-27 DIAGNOSIS — Z79.899 CONTROLLED SUBSTANCE AGREEMENT SIGNED: ICD-10-CM

## 2023-03-27 DIAGNOSIS — Z00.00 ENCOUNTER FOR PREVENTATIVE ADULT HEALTH CARE EXAMINATION: Primary | ICD-10-CM

## 2023-03-27 LAB
ALBUMIN UR-MCNC: NEGATIVE MG/DL
APPEARANCE UR: CLEAR
BACTERIA #/AREA URNS HPF: ABNORMAL /HPF
BILIRUB UR QL STRIP: NEGATIVE
CAOX CRY #/AREA URNS HPF: ABNORMAL /HPF
COLOR UR AUTO: YELLOW
ERYTHROCYTE [DISTWIDTH] IN BLOOD BY AUTOMATED COUNT: 12.8 % (ref 10–15)
GLUCOSE UR STRIP-MCNC: NEGATIVE MG/DL
HCT VFR BLD AUTO: 48.3 % (ref 35–47)
HGB BLD-MCNC: 16.1 G/DL (ref 11.7–15.7)
HGB UR QL STRIP: NEGATIVE
KETONES UR STRIP-MCNC: ABNORMAL MG/DL
LEUKOCYTE ESTERASE UR QL STRIP: NEGATIVE
MCH RBC QN AUTO: 29.9 PG (ref 26.5–33)
MCHC RBC AUTO-ENTMCNC: 33.3 G/DL (ref 31.5–36.5)
MCV RBC AUTO: 90 FL (ref 78–100)
NITRATE UR QL: NEGATIVE
PH UR STRIP: 6 [PH] (ref 5–7)
PLATELET # BLD AUTO: 348 10E3/UL (ref 150–450)
RBC # BLD AUTO: 5.38 10E6/UL (ref 3.8–5.2)
RBC #/AREA URNS AUTO: ABNORMAL /HPF
SP GR UR STRIP: >=1.03 (ref 1–1.03)
SQUAMOUS #/AREA URNS AUTO: ABNORMAL /LPF
UROBILINOGEN UR STRIP-ACNC: 0.2 E.U./DL
WBC # BLD AUTO: 6.7 10E3/UL (ref 4–11)
WBC #/AREA URNS AUTO: ABNORMAL /HPF

## 2023-03-27 PROCEDURE — 36415 COLL VENOUS BLD VENIPUNCTURE: CPT | Performed by: INTERNAL MEDICINE

## 2023-03-27 PROCEDURE — 80053 COMPREHEN METABOLIC PANEL: CPT | Performed by: INTERNAL MEDICINE

## 2023-03-27 PROCEDURE — 81001 URINALYSIS AUTO W/SCOPE: CPT | Performed by: INTERNAL MEDICINE

## 2023-03-27 PROCEDURE — 99214 OFFICE O/P EST MOD 30 MIN: CPT | Mod: 25 | Performed by: INTERNAL MEDICINE

## 2023-03-27 PROCEDURE — 80061 LIPID PANEL: CPT | Performed by: INTERNAL MEDICINE

## 2023-03-27 PROCEDURE — 99396 PREV VISIT EST AGE 40-64: CPT | Performed by: INTERNAL MEDICINE

## 2023-03-27 PROCEDURE — 84443 ASSAY THYROID STIM HORMONE: CPT | Performed by: INTERNAL MEDICINE

## 2023-03-27 PROCEDURE — 85027 COMPLETE CBC AUTOMATED: CPT | Performed by: INTERNAL MEDICINE

## 2023-03-27 RX ORDER — ALPRAZOLAM 0.5 MG
0.5 TABLET ORAL DAILY PRN
Qty: 15 TABLET | Refills: 0 | Status: SHIPPED | OUTPATIENT
Start: 2023-03-27 | End: 2023-04-25

## 2023-03-27 ASSESSMENT — PAIN SCALES - GENERAL: PAINLEVEL: NO PAIN (0)

## 2023-03-27 NOTE — PROGRESS NOTES
Assessment & Plan     REBEKAH (generalized anxiety disorder)  Continue treatment   Discussed medications side effects   - ALPRAZolam (XANAX) 0.5 MG tablet; Take 1 tablet (0.5 mg) by mouth daily as needed for anxiety  - OFFICE/OUTPT VISIT,EST,LEVL III    Encounter for preventative adult health care examination  advised regular aerobic activity, low cholesterol, low salt diet, wearing seat belt,  self examinations, sunscreen protection.Obtain screening cholesterol, immunizations reviewed.    - *MA Screening Digital Bilateral; Future  - Colonoscopy Screening  Referral; Future  - DX Hip/Pelvis/Spine  - Lipid panel reflex to direct LDL Fasting  - CBC with platelets  - Comprehensive metabolic panel (BMP + Alb, Alk Phos, ALT, AST, Total. Bili, TP)  - TSH with free T4 reflex  - UA with Microscopic reflex to Culture - lab collect  - Drug Confirmation Panel Urine with Creat - lab collect    Encounter for screening mammogram for breast cancer    - *MA Screening Digital Bilateral; Future    Colon cancer screening    - Colonoscopy Screening  Referral; Future    Family history of colon cancer    - Colonoscopy Screening  Referral; Future    Essential hypertension, benign  Controlled on treatment   - Lipid panel reflex to direct LDL Fasting  - CBC with platelets  - Comprehensive metabolic panel (BMP + Alb, Alk Phos, ALT, AST, Total. Bili, TP)  - TSH with free T4 reflex  - UA with Microscopic reflex to Culture - lab collect  - OFFICE/OUTPT VISIT,EST,LEVL III    Menopause    - DX Hip/Pelvis/Spine  - Drug Confirmation Panel Urine with Creat - lab collect    Attention deficit disorder, unspecified hyperactivity presence  Continue treatment, try to decrease to once daily use of Adderall   - OFFICE/OUTPT VISIT,EST,LEVL III    Controlled substance agreement signed    - Drug Confirmation Panel Urine with Creat - lab collect        PATIENT IS SEEN FOR PHYSICAL EXAMINATION    CHIEF COMPLAINT:   1. Encounter for  preventative adult health care examination    2. REBEKAH (generalized anxiety disorder)    3. Encounter for screening mammogram for breast cancer    4. Colon cancer screening    5. Family history of colon cancer    6. Essential hypertension, benign    7. Menopause    8. Attention deficit disorder, unspecified hyperactivity presence    9. Controlled substance agreement signed        PMH:   Past Medical History:   Diagnosis Date     ADD (attention deficit disorder)      Hypertension      Obesity        PSH:   Past Surgical History:   Procedure Laterality Date     ARTHROPLASTY HIP  2017    left hip replaced     ARTHROSCOPIC REPAIR POSTERIOR CRUCIATE LIGAMENT       BUNIONECTOMY Left 2018    Procedure: BUNIONECTOMY;  1.  Closing base osteotomy, first metatarsal, left foot.   2.  Akin osteotomy, left great toe. ;  Surgeon: Eliezer Parra DPM;  Location:  OR      SECTION  10/1998    x2     COLONOSCOPY  06/15/2018    Dr. So [facility]     COLONOSCOPY N/A 6/15/2018    Procedure: COLONOSCOPY;  Colonoscopy ;  Surgeon: Luis Fernando So MD;  Location:  GI     Foot surgery - bone spurs  2002     LAMINECT/DISCECTOMY, CERVICAL      C6-C7 Fusion.     OSTEOTOMY FOOT Left 2018    Procedure: OSTEOTOMY FOOT;;  Surgeon: Eliezer Parra DPM;  Location:  OR       MEDICATIONS:   Current Outpatient Medications:      ALPRAZolam (XANAX) 0.5 MG tablet, Take 1 tablet (0.5 mg) by mouth daily as needed for anxiety, Disp: 15 tablet, Rfl: 0     cetirizine (ZYRTEC) 10 MG tablet, TAKE ONE TABLET BY MOUTH EVERY EVENING, Disp: 90 tablet, Rfl: 2     EPINEPHrine (ANY BX GENERIC EQUIV) 0.3 MG/0.3ML injection 2-pack, INJECT 0.3 MILLILITERS INTRAMUSCULARLY ONCE AS NEEDED FOR ANAPHYLAXIS, Disp: 2 each, Rfl: 1     gabapentin (NEURONTIN) 600 MG tablet, Take 1 tablet (600 mg) by mouth 3 times daily, Disp: 270 tablet, Rfl: 0     gabapentin (NEURONTIN) 600 MG tablet, Take 1 tablet (600 mg) by mouth 3 times daily, Disp: 270 tablet, Rfl:  0     lisinopril (ZESTRIL) 20 MG tablet, Take 1 tablet (20 mg) by mouth daily, Disp: 90 tablet, Rfl: 0     methylphenidate (RITALIN) 20 MG tablet, Take 1 tablet (20 mg) by mouth 2 times daily, Disp: 60 tablet, Rfl: 0    SOCIAL HISTORY:   Social History     Socioeconomic History     Marital status:      Spouse name: Not on file     Number of children: 2     Years of education: Not on file     Highest education level: Not on file   Occupational History     Employer: [employer]   Tobacco Use     Smoking status: Never     Smokeless tobacco: Never   Vaping Use     Vaping Use: Never used   Substance and Sexual Activity     Alcohol use: Yes     Comment: 2 -3  beers a week     Drug use: No     Sexual activity: Yes     Partners: Male     Birth control/protection: Surgical     Comment:  vasectomy   Other Topics Concern     Parent/sibling w/ CABG, MI or angioplasty before 65F 55M? Yes     Comment: father MI at 48   Social History Narrative     Not on file     Social Determinants of Health     Financial Resource Strain: Medium Risk     Difficulty of Paying Living Expenses: Somewhat hard   Food Insecurity: Food Insecurity Present     Worried About Running Out of Food in the Last Year: Not on file     Ran Out of Food in the Last Year: Sometimes true   Transportation Needs: No Transportation Needs     Lack of Transportation (Medical): No     Lack of Transportation (Non-Medical): No   Physical Activity: Unknown     Days of Exercise per Week: Not on file     Minutes of Exercise per Session: 30 min   Stress: Stress Concern Present     Feeling of Stress : To some extent   Social Connections: Socially Integrated     Frequency of Communication with Friends and Family: Three times a week     Frequency of Social Gatherings with Friends and Family: More than three times a week     Attends Gnosticism Services: More than 4 times per year     Active Member of Clubs or Organizations: Yes     Attends Club or  Organization Meetings: Not on file     Marital Status:    Intimate Partner Violence: Not on file   Housing Stability: Unknown     Unable to Pay for Housing in the Last Year: Patient refused     Number of Places Lived in the Last Year: 1     Unstable Housing in the Last Year: No       FAMILY HISTORY:   Family History   Problem Relation Age of Onset     Cancer Mother         lung     Heart Disease Father         congestive heart failure     Colon Cancer No family hx of        ALLERGIES:   Allergies as of 03/27/2023 - Reviewed 03/27/2023   Allergen Reaction Noted     Bee venom  03/05/2015     Sulfa drugs Hives 08/05/2002       PREVENTIVE:discussed:  -immunizations  -lipid profile  -colonoscopy  -hemoccults  -mammogram  -PAP  -diet    EXAM: SUBJECTIVE:  Jayleen Lang, a 57 year old female scheduled an appointment to discuss the following issues:     REBEKAH (generalized anxiety disorder)  Encounter for preventative adult health care examination  Encounter for screening mammogram for breast cancer  Colon cancer screening  Family history of colon cancer  Essential hypertension, benign  Menopause  Attention deficit disorder, unspecified hyperactivity presence  Controlled substance agreement signed    Medical, social, surgical, and family histories reviewed.    Subjective   Jayleen is a 57 year old, presenting for the following health issues:  Derm Problem and Recheck Medication (Has scab on left leg that won't heal)  No flowsheet data found.  History of Present Illness       Reason for visit:  Med check    She eats 2-3 servings of fruits and vegetables daily.She consumes 0 sweetened beverage(s) daily.She exercises with enough effort to increase her heart rate 30 to 60 minutes per day.  She exercises with enough effort to increase her heart rate 4 days per week.   She is taking medications regularly.     Has h/o HTN. on medical treatment. BP has been controlled. No side effects from medications. No CP, HA, dizziness. good  "compliance with medications and low salt diet.  Has h/o anxiety, on PRN Ativan ,helps with symptoms, no side effects .   Has h/o ADHD, on Adderall. No side effect, helps with her concentration , work.           Review of Systems   Constitutional, HEENT, cardiovascular, pulmonary, GI, , musculoskeletal, neuro, skin, endocrine and psych systems are negative, except as otherwise noted.      Objective    /86 (BP Location: Left arm, Cuff Size: Adult Regular)   Pulse (!) 133   Temp 98.4  F (36.9  C) (Oral)   Resp 18   Ht 1.651 m (5' 5\")   Wt 72.5 kg (159 lb 12.8 oz)   LMP  (LMP Unknown)   SpO2 95%   BMI 26.59 kg/m    Body mass index is 26.59 kg/m .  Physical Exam   GENERAL: healthy, alert and no distress  EYES: Eyes grossly normal to inspection, PERRL and conjunctivae and sclerae normal  HENT: ear canals and TM's normal, nose and mouth without ulcers or lesions  NECK: no adenopathy, no asymmetry, masses, or scars and thyroid normal to palpation  RESP: lungs clear to auscultation - no rales, rhonchi or wheezes  CV: regular rate and rhythm, normal S1 S2, no S3 or S4, no murmur, click or rub, no peripheral edema and peripheral pulses strong  ABDOMEN: soft, nontender, no hepatosplenomegaly, no masses and bowel sounds normal  MS: no gross musculoskeletal defects noted, no edema  SKIN: no suspicious lesions or rashes  NEURO: Normal strength and tone, mentation intact and speech normal  PSYCH: mentation appears normal, affect normal/bright    Office Visit on 07/05/2022   Component Date Value Ref Range Status     Interpretation 07/05/2022 Negative for Intraepithelial Lesion or Malignancy (NILM)    Final     Comment 07/05/2022    Final                    Value:This result contains rich text formatting which cannot be displayed here.     Specimen Adequacy 07/05/2022 Satisfactory for evaluation, endocervical/transformation zone component absent   Final     Clinical Information 07/05/2022    Final                    " Value:This result contains rich text formatting which cannot be displayed here.     Reflex Testing 07/05/2022 Yes regardless of result   Final     Previous Abnormal? 07/05/2022    Final                    Value:This result contains rich text formatting which cannot be displayed here.     Performing Labs 07/05/2022    Final                    Value:This result contains rich text formatting which cannot be displayed here.     Color Urine 07/05/2022 Yellow  Colorless, Straw, Light Yellow, Yellow Final     Appearance Urine 07/05/2022 Clear  Clear Final     Glucose Urine 07/05/2022 100 (A)  Negative mg/dL Final     Bilirubin Urine 07/05/2022 Negative  Negative Final     Ketones Urine 07/05/2022 Negative  Negative mg/dL Final     Specific Gravity Urine 07/05/2022 1.025  1.003 - 1.035 Final     Blood Urine 07/05/2022 Negative  Negative Final     pH Urine 07/05/2022 6.0  5.0 - 7.0 Final     Protein Albumin Urine 07/05/2022 Negative  Negative mg/dL Final     Urobilinogen Urine 07/05/2022 1.0  0.2, 1.0 E.U./dL Final     Nitrite Urine 07/05/2022 Negative  Negative Final     Leukocyte Esterase Urine 07/05/2022 Trace (A)  Negative Final     Bacteria Urine 07/05/2022 Few (A)  None Seen /HPF Final     RBC Urine 07/05/2022 0-2  0-2 /HPF /HPF Final     WBC Urine 07/05/2022 0-5  0-5 /HPF /HPF Final     Squamous Epithelials Urine 07/05/2022 Few (A)  None Seen /LPF Final     HIV Antigen Antibody Combo 07/05/2022 Nonreactive  Nonreactive Final    HIV-1 p24 Ag & HIV-1/HIV-2 Ab Not Detected     Sodium 07/05/2022 137  133 - 144 mmol/L Final     Potassium 07/05/2022 3.7  3.4 - 5.3 mmol/L Final     Chloride 07/05/2022 105  94 - 109 mmol/L Final     Carbon Dioxide (CO2) 07/05/2022 26  20 - 32 mmol/L Final     Anion Gap 07/05/2022 6  3 - 14 mmol/L Final     Urea Nitrogen 07/05/2022 16  7 - 30 mg/dL Final     Creatinine 07/05/2022 0.81  0.52 - 1.04 mg/dL Final     Calcium 07/05/2022 9.1  8.5 - 10.1 mg/dL Final     Glucose 07/05/2022 106 (H)  70  - 99 mg/dL Final     Alkaline Phosphatase 07/05/2022 67  40 - 150 U/L Final     AST 07/05/2022 84 (H)  0 - 45 U/L Final     ALT 07/05/2022 105 (H)  0 - 50 U/L Final     Protein Total 07/05/2022 7.1  6.8 - 8.8 g/dL Final     Albumin 07/05/2022 4.1  3.4 - 5.0 g/dL Final     Bilirubin Total 07/05/2022 0.5  0.2 - 1.3 mg/dL Final     GFR Estimate 07/05/2022 86  >60 mL/min/1.73m2 Final    Effective December 21, 2021 eGFRcr in adults is calculated using the 2021 CKD-EPI creatinine equation which includes age and gender (Guillermo et al., [place], DOI: 10.1056/KWEDve0414492)     Cholesterol 07/05/2022 248 (H)  <200 mg/dL Final     Triglycerides 07/05/2022 120  <150 mg/dL Final     Direct Measure HDL 07/05/2022 72  >=50 mg/dL Final     LDL Cholesterol Calculated 07/05/2022 152 (H)  <=100 mg/dL Final     Non HDL Cholesterol 07/05/2022 176 (H)  <130 mg/dL Final     Patient Fasting > 8hrs? 07/05/2022 Yes   Final     WBC Count 07/05/2022 5.6  4.0 - 11.0 10e3/uL Final     RBC Count 07/05/2022 5.11  3.80 - 5.20 10e6/uL Final     Hemoglobin 07/05/2022 16.0 (H)  11.7 - 15.7 g/dL Final     Hematocrit 07/05/2022 47.7 (H)  35.0 - 47.0 % Final     MCV 07/05/2022 93  78 - 100 fL Final     MCH 07/05/2022 31.3  26.5 - 33.0 pg Final     MCHC 07/05/2022 33.5  31.5 - 36.5 g/dL Final     RDW 07/05/2022 13.9  10.0 - 15.0 % Final     Platelet Count 07/05/2022 291  150 - 450 10e3/uL Final     TSH 07/05/2022 1.37  0.40 - 4.00 mU/L Final     Gabapentin (Neurontin) 07/05/2022 Present (A)  Absent Final    Sources of gabapentin are prescription medications.     Amphetamine ng/mL 07/05/2022 >12,000 (H)  <50 ng/mL Final     Amphetamine 07/05/2022    Final    Unable to calculate: Result value above analytical measurement range     Ritalinic Acid ng/mL 07/05/2022 4,480 (H)  <50 ng/mL Final     Ritalinic Acid 07/05/2022 1,991  Absent ng/mg [creat] Final    Ritalinic acid is an expected metabolite of methylphenidate.     Creatinine Urine for Drug Screen 07/05/2022  225  mg/dL Final     Cannabinoids Urine 07/05/2022 Screen Negative  Screen Negative Final    Cutoff for a negative cannabinoid is less than 50 ng/mL.     Hepatitis C RNA IU/mL 07/05/2022 Not Detected  Not Detected IU/mL Final     Hepatitis B Surface Antigen 07/05/2022 Nonreactive  Nonreactive Final     Sodium 07/05/2022 142  136 - 145 mmol/L Final     Potassium 07/05/2022 4.2  3.4 - 5.3 mmol/L Final     Creatinine 07/05/2022 0.90  0.51 - 0.95 mg/dL Final     Urea Nitrogen 07/05/2022 16.2  6.0 - 20.0 mg/dL Final     Chloride 07/05/2022 102  98 - 107 mmol/L Final     Carbon Dioxide (CO2) 07/05/2022 20 (L)  22 - 29 mmol/L Final     Anion Gap 07/05/2022 20 (H)  7 - 15 mmol/L Final     Glucose 07/05/2022 103 (H)  70 - 99 mg/dL Final     Calcium 07/05/2022 9.7  8.6 - 10.0 mg/dL Final     Protein Total 07/05/2022 6.8  6.4 - 8.3 g/dL Final     Albumin 07/05/2022 4.7  3.5 - 5.2 g/dL Final     Bilirubin Total 07/05/2022 0.4  <=1.2 mg/dL Final     Alkaline Phosphatase 07/05/2022 66  35 - 104 U/L Final     AST 07/05/2022 86 (H)  10 - 35 U/L Final     ALT 07/05/2022 106 (H)  10 - 35 U/L Final     GFR Estimate 07/05/2022 76  >60 mL/min/1.73m2 Final    Effective December 21, 2021 eGFRcr in adults is calculated using the 2021 CKD-EPI creatinine equation which includes age and gender (Guillermo et al., NEJM, DOI: 10.1056/ZMXMcx3740833)     Ferritin 07/05/2022 161  11 - 328 ng/mL Final     Iron 07/05/2022 136  37 - 145 ug/dL Final     Iron Sat Index 07/05/2022 38  15 - 46 % Final     Iron Binding Capacity 07/05/2022 359  240 - 430 ug/dL Final     Hepatitis A Antibody IgG 07/05/2022 Nonreactive  Nonreactive Final     Other HR HPV 07/05/2022 Negative  Negative Final     HPV16 DNA 07/05/2022 Negative  Negative Final     HPV18 DNA 07/05/2022 Negative  Negative Final     FINAL DIAGNOSIS 07/05/2022    Final                    Value:This result contains rich text formatting which cannot be displayed here.

## 2023-03-28 LAB
ALBUMIN SERPL BCG-MCNC: 4.8 G/DL (ref 3.5–5.2)
ALP SERPL-CCNC: 66 U/L (ref 35–104)
ALT SERPL W P-5'-P-CCNC: 37 U/L (ref 10–35)
ANION GAP SERPL CALCULATED.3IONS-SCNC: 14 MMOL/L (ref 7–15)
AST SERPL W P-5'-P-CCNC: 31 U/L (ref 10–35)
BILIRUB SERPL-MCNC: 0.6 MG/DL
BUN SERPL-MCNC: 14.5 MG/DL (ref 6–20)
CALCIUM SERPL-MCNC: 10 MG/DL (ref 8.6–10)
CHLORIDE SERPL-SCNC: 103 MMOL/L (ref 98–107)
CHOLEST SERPL-MCNC: 223 MG/DL
CREAT SERPL-MCNC: 0.92 MG/DL (ref 0.51–0.95)
DEPRECATED HCO3 PLAS-SCNC: 26 MMOL/L (ref 22–29)
GFR SERPL CREATININE-BSD FRML MDRD: 73 ML/MIN/1.73M2
GLUCOSE SERPL-MCNC: 96 MG/DL (ref 70–99)
HDLC SERPL-MCNC: 92 MG/DL
LDLC SERPL CALC-MCNC: 119 MG/DL
NONHDLC SERPL-MCNC: 131 MG/DL
POTASSIUM SERPL-SCNC: 4.1 MMOL/L (ref 3.4–5.3)
PROT SERPL-MCNC: 7.6 G/DL (ref 6.4–8.3)
SODIUM SERPL-SCNC: 143 MMOL/L (ref 136–145)
TRIGL SERPL-MCNC: 59 MG/DL
TSH SERPL DL<=0.005 MIU/L-ACNC: 0.87 UIU/ML (ref 0.3–4.2)

## 2023-04-13 ENCOUNTER — MYC REFILL (OUTPATIENT)
Dept: INTERNAL MEDICINE | Facility: CLINIC | Age: 58
End: 2023-04-13
Payer: COMMERCIAL

## 2023-04-13 DIAGNOSIS — F98.8 ATTENTION DEFICIT DISORDER, UNSPECIFIED HYPERACTIVITY PRESENCE: ICD-10-CM

## 2023-04-17 RX ORDER — METHYLPHENIDATE HYDROCHLORIDE 20 MG/1
20 TABLET ORAL 2 TIMES DAILY
Qty: 60 TABLET | Refills: 0 | Status: SHIPPED | OUTPATIENT
Start: 2023-04-17 | End: 2023-05-16

## 2023-04-25 ENCOUNTER — MYC REFILL (OUTPATIENT)
Dept: INTERNAL MEDICINE | Facility: CLINIC | Age: 58
End: 2023-04-25
Payer: COMMERCIAL

## 2023-04-25 DIAGNOSIS — F41.1 GAD (GENERALIZED ANXIETY DISORDER): ICD-10-CM

## 2023-04-26 RX ORDER — ALPRAZOLAM 0.5 MG
0.5 TABLET ORAL DAILY PRN
Qty: 15 TABLET | Refills: 0 | Status: SHIPPED | OUTPATIENT
Start: 2023-04-26 | End: 2023-05-28

## 2023-05-01 ENCOUNTER — HOSPITAL ENCOUNTER (OUTPATIENT)
Dept: MAMMOGRAPHY | Facility: CLINIC | Age: 58
Discharge: HOME | End: 2023-05-01
Attending: INTERNAL MEDICINE | Admitting: INTERNAL MEDICINE
Payer: COMMERCIAL

## 2023-05-01 ENCOUNTER — VIRTUAL VISIT (OUTPATIENT)
Dept: FAMILY MEDICINE | Facility: CLINIC | Age: 58
End: 2023-05-01
Payer: COMMERCIAL

## 2023-05-01 DIAGNOSIS — Z00.00 PREVENTATIVE HEALTH CARE: ICD-10-CM

## 2023-05-01 DIAGNOSIS — J01.00 ACUTE NON-RECURRENT MAXILLARY SINUSITIS: Primary | ICD-10-CM

## 2023-05-01 PROCEDURE — 99213 OFFICE O/P EST LOW 20 MIN: CPT | Mod: VID | Performed by: NURSE PRACTITIONER

## 2023-05-01 PROCEDURE — 77067 SCR MAMMO BI INCL CAD: CPT

## 2023-05-01 NOTE — PROGRESS NOTES
Jayleen is a 57 year old who is being evaluated via a billable video visit.      How would you like to obtain your AVS? MyChart  If the video visit is dropped, the invitation should be resent by: Text to cell phone: [phone]  Will anyone else be joining your video visit? No              Subjective   Jayleen is a 57 year old, presenting for the following health issues:  Sinus Problem        3/27/2023    12:53 PM   Additional Questions   Roomed by Viki PLASCENCIA   Accompanied by ERLINDA     History of Present Illness       Reason for visit:  Sinus infection  Symptom onset:  1-3 days ago    She eats 2-3 servings of fruits and vegetables daily.She consumes 0 sweetened beverage(s) daily.She exercises with enough effort to increase her heart rate 20 to 29 minutes per day.  She exercises with enough effort to increase her heart rate 4 days per week.   She is taking medications regularly.       Acute Illness  Acute illness concerns: Sinus infection  Onset/Duration: two-three days  Symptoms:  Fever: No  Chills/Sweats: YES- chills  Headache (location?): YES  Sinus Pressure: YES- and teeth hurt  Conjunctivitis:  No  Ear Pain: no  Rhinorrhea: YES  Congestion: YES  Sore Throat: YES  Cough: YES-non-productive, barking  Wheeze: No  Decreased Appetite: No  Nausea: No  Vomiting: No  Diarrhea: YES  Dysuria/Freq.: No  Dysuria or Hematuria: No  Fatigue/Achiness: No  Sick/Strep Exposure: No  Therapies tried and outcome: None  Does have allergies   Says her teeth hurt and says   Does have a sinus infection about once a year  Had a headache last Monday and this feels like her allergies are worse   Some cough but not wheezing and not short of breath       Labs reviewed in EPIC  BP Readings from Last 3 Encounters:   03/27/23 129/86   07/05/22 130/70   01/19/21 124/76    Wt Readings from Last 3 Encounters:   03/27/23 72.5 kg (159 lb 12.8 oz)   07/05/22 69.3 kg (152 lb 11.2 oz)   01/19/21 71.5 kg (157 lb 9.6 oz)                  Patient Active Problem List    Diagnosis     Rash     Menorrhagia     Obesity     REBEKAH (generalized anxiety disorder)     Essential hypertension, benign     Allergic reaction     Hyperlipidemia LDL goal <130     Chronic neck pain     Controlled substance agreement signed     Attention deficit disorder, unspecified hyperactivity presence     Past Surgical History:   Procedure Laterality Date     ARTHROPLASTY HIP  2017    left hip replaced     ARTHROSCOPIC REPAIR POSTERIOR CRUCIATE LIGAMENT       BUNIONECTOMY Left 2018    Procedure: BUNIONECTOMY;  1.  Closing base osteotomy, first metatarsal, left foot.   2.  Akin osteotomy, left great toe. ;  Surgeon: Eliezer Parra DPM;  Location: [facility] OR      SECTION  10/1998    x2     COLONOSCOPY  06/15/2018    Dr. So [facility]     COLONOSCOPY N/A 6/15/2018    Procedure: COLONOSCOPY;  Colonoscopy ;  Surgeon: Luis Fernando So MD;  Location:  GI     Foot surgery - bone spurs  2002     LAMINECT/DISCECTOMY, CERVICAL      C6-C7 Fusion.     OSTEOTOMY FOOT Left 2018    Procedure: OSTEOTOMY FOOT;;  Surgeon: Eliezer Parra DPM;  Location:  OR       Social History     Tobacco Use     Smoking status: Never     Smokeless tobacco: Never   Vaping Use     Vaping status: Never Used     Passive vaping exposure: Yes   Substance Use Topics     Alcohol use: Yes     Comment: 2 -3  beers a week     Family History   Problem Relation Age of Onset     Cancer Mother         lung     Heart Disease Father         congestive heart failure     Colon Cancer No family hx of          Current Outpatient Medications   Medication Sig Dispense Refill     ALPRAZolam (XANAX) 0.5 MG tablet Take 1 tablet (0.5 mg) by mouth daily as needed for anxiety 15 tablet 0     gabapentin (NEURONTIN) 600 MG tablet Take 1 tablet (600 mg) by mouth 3 times daily 270 tablet 0     gabapentin (NEURONTIN) 600 MG tablet Take 1 tablet (600 mg) by mouth 3 times daily 270 tablet 0     lisinopril (ZESTRIL) 20 MG tablet Take 1 tablet (20 mg) by  mouth daily 90 tablet 0     methylphenidate (RITALIN) 20 MG tablet Take 1 tablet (20 mg) by mouth 2 times daily 60 tablet 0     cetirizine (ZYRTEC) 10 MG tablet TAKE ONE TABLET BY MOUTH EVERY EVENING (Patient not taking: Reported on 5/1/2023) 90 tablet 2     EPINEPHrine (ANY BX GENERIC EQUIV) 0.3 MG/0.3ML injection 2-pack INJECT 0.3 MILLILITERS INTRAMUSCULARLY ONCE AS NEEDED FOR ANAPHYLAXIS (Patient not taking: Reported on 5/1/2023) 2 each 1     Allergies   Allergen Reactions     Bee Venom      Sulfa Antibiotics Hives       Review of Systems   Constitutional, HEENT, cardiovascular, pulmonary, gi and gu systems are negative, except as otherwise noted.      Objective           Vitals:  No vitals were obtained today due to virtual visit.    Physical Exam   GENERAL: Patient is well nourished, well developed, well groomed and in no acute distress,non-toxic, in no respiratory distress and acyanotic, alert and cooperative  moderately uncomfortable and ill-appearing    EYES: Eyes grossly normal to inspection.  No discharge or erythema, or obvious scleral/conjunctival abnormalities.  HENT: normal cephalic/atraumatic and oral mucous membranes moist  RESP: No audible wheeze, cough, or visible cyanosis.  No visible retractions or increased work of breathing.    MS: extremities normal- no gross deformities noted  SKIN: Visible skin clear. No significant rash, abnormal pigmentation or lesions.  NEURO: mentation intact  PSYCH: Mentation appears normal, affect normal/bright, judgement and insight intact, normal speech and appearance well-groomed.    Diagnostic Test Results:   Diagnostic Test Results:  Labs reviewed in Epic      Assessment & Plan     Acute non-recurrent maxillary sinusitis  Discussed the nature and pathophysiology of sinusitis and treatment including the role of antibiotics and the need to get over the underlying trigger, URI or allergies.  Will Start:  - amoxicillin-clavulanate (AUGMENTIN) 875-125 MG tablet   Dispense: 20 tablet; Refill: 0      Prescription drug management   Time spent by me doing chart review, history and exam, documentation and further activities per the note       See Patient Instructions  Patient Instructions     PLAN:   1.   Symptomatic therapy suggested: rest, increase fluids, apply heat to sinuses prn, use mist of vaporizer prn, OTC saline nasal spray as needed, and call prn if symptoms persist or worsen.  2.  Orders Placed This Encounter   Medications     amoxicillin-clavulanate (AUGMENTIN) 875-125 MG tablet     Sig: Take 1 tablet by mouth 2 times daily     Dispense:  20 tablet     Refill:  0     3. Patient needs to follow up in if no improvement,or sooner if worsening of symptoms or other symptoms develop.      SPENCER [facility] [place]          Video-Visit Details    Type of service:  Video Visit     Originating Location (pt. Location): Home    Distant Location (provider location):  Off-site  Platform used for Video Visit: Louis

## 2023-05-01 NOTE — PATIENT INSTRUCTIONS
PLAN:   1.   Symptomatic therapy suggested: rest, increase fluids, apply heat to sinuses prn, use mist of vaporizer prn, OTC saline nasal spray as needed, and call prn if symptoms persist or worsen.  2.  Orders Placed This Encounter   Medications    amoxicillin-clavulanate (AUGMENTIN) 875-125 MG tablet     Sig: Take 1 tablet by mouth 2 times daily     Dispense:  20 tablet     Refill:  0     3. Patient needs to follow up in if no improvement,or sooner if worsening of symptoms or other symptoms develop.

## 2023-05-03 RX ORDER — EPINEPHRINE 0.3 MG/.3ML
INJECTION SUBCUTANEOUS
Qty: 2 EACH | Refills: 1 | Status: SHIPPED | OUTPATIENT
Start: 2023-05-03 | End: 2024-05-20

## 2023-05-10 DIAGNOSIS — G62.9 NEUROPATHY: ICD-10-CM

## 2023-05-10 RX ORDER — GABAPENTIN 600 MG/1
TABLET ORAL
Qty: 270 TABLET | Refills: 0 | Status: SHIPPED | OUTPATIENT
Start: 2023-05-10 | End: 2024-02-09

## 2023-05-16 DIAGNOSIS — F98.8 ATTENTION DEFICIT DISORDER, UNSPECIFIED HYPERACTIVITY PRESENCE: ICD-10-CM

## 2023-05-16 RX ORDER — METHYLPHENIDATE HYDROCHLORIDE 20 MG/1
TABLET ORAL
Qty: 60 TABLET | Refills: 0 | Status: SHIPPED | OUTPATIENT
Start: 2023-05-16 | End: 2023-06-14

## 2023-05-28 ENCOUNTER — MYC REFILL (OUTPATIENT)
Dept: INTERNAL MEDICINE | Facility: CLINIC | Age: 58
End: 2023-05-28
Payer: COMMERCIAL

## 2023-05-28 DIAGNOSIS — F41.1 GAD (GENERALIZED ANXIETY DISORDER): ICD-10-CM

## 2023-05-30 RX ORDER — ALPRAZOLAM 0.5 MG
0.5 TABLET ORAL DAILY PRN
Qty: 15 TABLET | Refills: 0 | Status: SHIPPED | OUTPATIENT
Start: 2023-05-30 | End: 2023-06-25

## 2023-06-11 ENCOUNTER — MYC REFILL (OUTPATIENT)
Dept: INTERNAL MEDICINE | Facility: CLINIC | Age: 58
End: 2023-06-11
Payer: COMMERCIAL

## 2023-06-11 DIAGNOSIS — F98.8 ATTENTION DEFICIT DISORDER, UNSPECIFIED HYPERACTIVITY PRESENCE: ICD-10-CM

## 2023-06-11 RX ORDER — METHYLPHENIDATE HYDROCHLORIDE 20 MG/1
20 TABLET ORAL 2 TIMES DAILY
Qty: 60 TABLET | Refills: 0 | Status: CANCELLED | OUTPATIENT
Start: 2023-06-11

## 2023-06-13 ENCOUNTER — MYC REFILL (OUTPATIENT)
Dept: INTERNAL MEDICINE | Facility: CLINIC | Age: 58
End: 2023-06-13
Payer: COMMERCIAL

## 2023-06-13 DIAGNOSIS — F98.8 ATTENTION DEFICIT DISORDER, UNSPECIFIED HYPERACTIVITY PRESENCE: ICD-10-CM

## 2023-06-13 RX ORDER — METHYLPHENIDATE HYDROCHLORIDE 20 MG/1
20 TABLET ORAL 2 TIMES DAILY
Qty: 60 TABLET | Refills: 0 | Status: CANCELLED | OUTPATIENT
Start: 2023-06-13

## 2023-06-13 NOTE — TELEPHONE ENCOUNTER
Pt sent my chart message.     Last refill on 5/16/23 for #60    Routing refill request to provider for review/approval because:  Drug not on the [facility] refill protocol       Hi Dr. Castellanos,   I hope you had a great trip! I am heading to [place] for a memorial service today and was wondering if I could get my methylphenidate renewed? Sorry to bug you and enjoy your day!  Jayleen

## 2023-06-14 RX ORDER — METHYLPHENIDATE HYDROCHLORIDE 20 MG/1
TABLET ORAL
Qty: 60 TABLET | Refills: 0 | Status: SHIPPED | OUTPATIENT
Start: 2023-06-14 | End: 2023-07-12

## 2023-06-25 ENCOUNTER — MYC REFILL (OUTPATIENT)
Dept: INTERNAL MEDICINE | Facility: CLINIC | Age: 58
End: 2023-06-25
Payer: COMMERCIAL

## 2023-06-25 DIAGNOSIS — F41.1 GAD (GENERALIZED ANXIETY DISORDER): ICD-10-CM

## 2023-06-26 RX ORDER — ALPRAZOLAM 0.5 MG
0.5 TABLET ORAL DAILY PRN
Qty: 15 TABLET | Refills: 0 | Status: SHIPPED | OUTPATIENT
Start: 2023-06-26 | End: 2023-09-02

## 2023-06-26 NOTE — TELEPHONE ENCOUNTER
Xanax refill request    Last refill on 5/30/23 for #15    Routing refill request to provider for review/approval because:  Drug not on the [facility] refill protocol

## 2023-07-09 ENCOUNTER — MYC REFILL (OUTPATIENT)
Dept: INTERNAL MEDICINE | Facility: CLINIC | Age: 58
End: 2023-07-09
Payer: COMMERCIAL

## 2023-07-09 DIAGNOSIS — F98.8 ATTENTION DEFICIT DISORDER, UNSPECIFIED HYPERACTIVITY PRESENCE: ICD-10-CM

## 2023-07-11 ENCOUNTER — OFFICE VISIT (OUTPATIENT)
Dept: MIDWIFE SERVICES | Facility: CLINIC | Age: 58
End: 2023-07-11
Payer: COMMERCIAL

## 2023-07-11 VITALS
DIASTOLIC BLOOD PRESSURE: 78 MMHG | HEIGHT: 65 IN | WEIGHT: 165 LBS | BODY MASS INDEX: 27.49 KG/M2 | SYSTOLIC BLOOD PRESSURE: 120 MMHG

## 2023-07-11 DIAGNOSIS — Z00.00 NORMAL FEMALE BREAST EXAM: ICD-10-CM

## 2023-07-11 DIAGNOSIS — F98.8 ATTENTION DEFICIT DISORDER, UNSPECIFIED HYPERACTIVITY PRESENCE: ICD-10-CM

## 2023-07-11 DIAGNOSIS — Z12.4 CERVICAL CANCER SCREENING: ICD-10-CM

## 2023-07-11 DIAGNOSIS — Z00.00 ANNUAL PHYSICAL EXAM: Primary | ICD-10-CM

## 2023-07-11 DIAGNOSIS — N94.10 PAIN IN FEMALE GENITALIA ON INTERCOURSE: ICD-10-CM

## 2023-07-11 PROCEDURE — G0145 SCR C/V CYTO,THINLAYER,RESCR: HCPCS | Performed by: ADVANCED PRACTICE MIDWIFE

## 2023-07-11 PROCEDURE — 99386 PREV VISIT NEW AGE 40-64: CPT | Performed by: ADVANCED PRACTICE MIDWIFE

## 2023-07-11 PROCEDURE — 87624 HPV HI-RISK TYP POOLED RSLT: CPT | Performed by: ADVANCED PRACTICE MIDWIFE

## 2023-07-11 PROCEDURE — 99212 OFFICE O/P EST SF 10 MIN: CPT | Mod: 25 | Performed by: ADVANCED PRACTICE MIDWIFE

## 2023-07-11 NOTE — NURSING NOTE
"Chief Complaint   Patient presents with     Gyn Exam       Initial /78   Ht 1.651 m (5' 5\")   Wt 74.8 kg (165 lb)   LMP  (LMP Unknown)   BMI 27.46 kg/m   Estimated body mass index is 27.46 kg/m  as calculated from the following:    Height as of this encounter: 1.651 m (5' 5\").    Weight as of this encounter: 74.8 kg (165 lb).  BP completed using cuff size: regular    Questioned patient about current smoking habits.  Pt. has never smoked.          The following  Due: pap smear- requesting pap  Sara Matos CMA on 2023 at 9:10 AM        "

## 2023-07-11 NOTE — PROGRESS NOTES
Jayleen is a 57 year old  female who presents for annual pelvic and breast exam.     Besides routine health maintenance,  she would like to discuss pain during intercourse and sunscreen options without excessive chemicals.  HPI:   No LMP recorded (lmp unknown). Patient is postmenopausal..   Using no contraception as she is postmenopausal.      REPRODUCTIVE/GYNECOLOGIC HISTORY:  Jayleen is sexually active with male partner(s) and is currently in monogamous relationship. State she has pain and dryness with intercourse.   STI testing offered?  Declined  History of abnormal Pap smear:  No, aware she in NOT due for a pap smear today but she is requesting it to be done. Aware insurance might not cover this testing as it is not due.   SOCIAL HISTORY  Abuse: does not report having previously been physical or sexually abused.    Do you feel safe in your environment? YES     She  reports that she has never smoked. She has never used smokeless tobacco.    Last PHQ-9 score on record =     2020     9:10 AM   PHQ-9 SCORE   PHQ-9 Total Score 6     Last GAD7 score on record =       2020     9:06 AM   REBEKAH-7 SCORE   Total Score 9       HEALTH MAINTENANCE:  Mammo: 23. She is concerned because it states that her breast tissue is dense so small masses could be missed. Reassurance provided of the normalcy of this and that the physical exam was normal as well.  History of abnormal Mammo: No.  Regular Self Breast Exams: Yes  TSH: (  TSH   Date Value Ref Range Status   2023 0.87 0.30 - 4.20 uIU/mL Final   2022 1.37 0.40 - 4.00 mU/L Final   2020 0.94 0.40 - 4.00 mU/L Final    )  Pap; (  Lab Results   Component Value Date    PAP NIL 2016    PAP NIL 2014    PAP NIL 2012     Health maintenance updated:  yes    HISTORY:  OB History    Para Term  AB Living   2 2 2 0 0 2   SAB IAB Ectopic Multiple Live Births   0 0 0 0 0      # Outcome Date GA Lbr Oscar/2nd Weight Sex Delivery Anes PTL Lv    2 Term            1 Term              Past Medical History:   Diagnosis Date     ADD (attention deficit disorder)      Hypertension      Obesity      Past Surgical History:   Procedure Laterality Date     ARTHROPLASTY HIP  2017    left hip replaced     ARTHROSCOPIC REPAIR POSTERIOR CRUCIATE LIGAMENT       BUNIONECTOMY Left 2018    Procedure: BUNIONECTOMY;  1.  Closing base osteotomy, first metatarsal, left foot.   2.  Akin osteotomy, left great toe. ;  Surgeon: Eliezer Parra DPM;  Location: [facility] OR      SECTION  10/1998    x2     COLONOSCOPY  06/15/2018    Dr. So [facility]     COLONOSCOPY N/A 6/15/2018    Procedure: COLONOSCOPY;  Colonoscopy ;  Surgeon: Luis Fernando So MD;  Location:  GI     Foot surgery - bone spurs  2002     LAMINECT/DISCECTOMY, CERVICAL      C6-C7 Fusion.     OSTEOTOMY FOOT Left 2018    Procedure: OSTEOTOMY FOOT;;  Surgeon: Eliezer Parra DPM;  Location: [facility] OR     Family History   Problem Relation Age of Onset     Cancer Mother         lung     Heart Disease Father         congestive heart failure     Colon Cancer No family hx of      Social History     Socioeconomic History     Marital status:      Spouse name: None     Number of children: 2     Years of education: None     Highest education level: None   Occupational History     Employer: [employer]   Tobacco Use     Smoking status: Never     Smokeless tobacco: Never   Vaping Use     Vaping Use: Never used   Substance and Sexual Activity     Alcohol use: Yes     Comment: 2 -3  beers a week     Drug use: No     Sexual activity: Yes     Partners: Male     Birth control/protection: Surgical     Comment:  vasectomy   Other Topics Concern     Parent/sibling w/ CABG, MI or angioplasty before 65F 55M? Yes     Comment: father MI at 48     Social Determinants of Health     Financial Resource Strain: Medium Risk (2022)    Overall Financial Resource Strain (CARDIA)      Difficulty of Paying  Living Expenses: Somewhat hard   Food Insecurity: Food Insecurity Present (7/5/2022)    Hunger Vital Sign      Ran Out of Food in the Last Year: Sometimes true   Transportation Needs: No Transportation Needs (7/5/2022)    PRAPARE - Transportation      Lack of Transportation (Medical): No      Lack of Transportation (Non-Medical): No   Physical Activity: Unknown (7/5/2022)    Exercise Vital Sign      Minutes of Exercise per Session: 30 min   Stress: Stress Concern Present (7/5/2022)    [employer] [employer] - Occupational Stress Questionnaire      Feeling of Stress : To some extent   Social Connections: Socially Integrated (7/5/2022)    Social Connection and Isolation Panel [NHANES]      Frequency of Communication with Friends and Family: Three times a week      Frequency of Social Gatherings with Friends and Family: More than three times a week      Attends Evangelical Services: More than 4 times per year      Active Member of Clubs or Organizations: Yes      Marital Status:    Housing Stability: Unknown (7/5/2022)    Housing Stability Vital Sign      Unable to Pay for Housing in the Last Year: Patient refused      Number of Places Lived in the Last Year: 1      Unstable Housing in the Last Year: No       Current Outpatient Medications:      ALPRAZolam (XANAX) 0.5 MG tablet, Take 1 tablet (0.5 mg) by mouth daily as needed for anxiety, Disp: 15 tablet, Rfl: 0     amoxicillin-clavulanate (AUGMENTIN) 875-125 MG tablet, Take 1 tablet by mouth 2 times daily, Disp: 20 tablet, Rfl: 0     EPINEPHrine (ANY BX GENERIC EQUIV) 0.3 MG/0.3ML injection 2-pack, INJECT 0.3 MILLILITERS INTRAMUSCULARLY ONCE AS NEEDED FOR ANAPHYLAXIS, Disp: 2 each, Rfl: 1     gabapentin (NEURONTIN) 600 MG tablet, TAKE ONE TABLET BY MOUTH THREE TIMES DAILY, Disp: 270 tablet, Rfl: 0     gabapentin (NEURONTIN) 600 MG tablet, Take 1 tablet (600 mg) by mouth 3 times daily, Disp: 270 tablet, Rfl: 0     lisinopril (ZESTRIL) 20 MG  "tablet, Take 1 tablet (20 mg) by mouth daily, Disp: 90 tablet, Rfl: 0     methylphenidate (RITALIN) 20 MG tablet, TAKE ONE TABLET BY MOUTH TWICE DAILY, Disp: 60 tablet, Rfl: 0     cetirizine (ZYRTEC) 10 MG tablet, TAKE ONE TABLET BY MOUTH EVERY EVENING (Patient not taking: Reported on 5/1/2023), Disp: 90 tablet, Rfl: 2     Allergies   Allergen Reactions     Bee Venom      Sulfa Antibiotics Hives       Past medical, surgical, social and family history were reviewed and updated in [facility].    ROS:   CONSTITUTIONAL: NEGATIVE for fever, chills, change in weight  BREAST: NEGATIVE for masses, tenderness or discharge  : NEGATIVE for unusual urinary or vaginal symptoms. No vaginal bleeding.     PHYSICAL EXAM:  /78   Ht 1.651 m (5' 5\")   Wt 74.8 kg (165 lb)   LMP  (LMP Unknown)   BMI 27.46 kg/m     BMI: Body mass index is 27.46 kg/m .  Constitutional: healthy, alert and no distress  Breast:normal without masses, tenderness or nipple discharge and no palpable axillary masses or adenopathy  Gastrointestinal: abdomen soft, non-tender, bowel sounds present  PELVIC EXAM:  Vulva: No lesions, no adenopathy, BUS WNL  Vagina: Moist, pink, discharge normal  well rugated, no lesions  Cervix:smooth, pink, no visible lesions  Uterus: Normal size, non-tender, mobile  Ovaries: No masses palpated  Rectal exam: deferred    ASSESSMENT/PLAN:    ICD-10-CM    1. Annual physical exam  Z00.00       2. Cervical cancer screening  Z12.4 US Pelvic Complete with Transvaginal     Pap screen with HPV - recommended age 30 - 65 years      3. Normal female breast exam  Z00.00         No results found for any visits on 07/11/23.    Plan:   Reassurance provided of normal breast exam. Discussed healthy options for sun protection. Pelvic exam normal but ultrasound ordered due to new onset of pain with intercourse. Discussed use of a good lubricant and Jayleen states they use coconut oil. RTC for annual exam or as needed.     Marla Alonso CNM        "

## 2023-07-12 RX ORDER — METHYLPHENIDATE HYDROCHLORIDE 20 MG/1
TABLET ORAL
Qty: 60 TABLET | Refills: 0 | Status: SHIPPED | OUTPATIENT
Start: 2023-07-12 | End: 2023-07-13

## 2023-07-12 RX ORDER — METHYLPHENIDATE HYDROCHLORIDE 20 MG/1
20 TABLET ORAL 2 TIMES DAILY
Qty: 60 TABLET | Refills: 0 | OUTPATIENT
Start: 2023-07-12

## 2023-07-13 ENCOUNTER — TELEPHONE (OUTPATIENT)
Dept: INTERNAL MEDICINE | Facility: CLINIC | Age: 58
End: 2023-07-13
Payer: COMMERCIAL

## 2023-07-13 DIAGNOSIS — F98.8 ATTENTION DEFICIT DISORDER, UNSPECIFIED HYPERACTIVITY PRESENCE: ICD-10-CM

## 2023-07-13 RX ORDER — METHYLPHENIDATE HYDROCHLORIDE 20 MG/1
20 TABLET ORAL 2 TIMES DAILY
Qty: 60 TABLET | Refills: 0 | Status: SHIPPED | OUTPATIENT
Start: 2023-07-13 | End: 2023-08-11

## 2023-07-13 RX ORDER — METHYLPHENIDATE HYDROCHLORIDE 20 MG/1
20 TABLET ORAL 2 TIMES DAILY
Qty: 60 TABLET | Refills: 0 | Status: CANCELLED | OUTPATIENT
Start: 2023-07-13

## 2023-07-13 NOTE — TELEPHONE ENCOUNTER
Patient calling regarding her refill request.  Would like medication refilled asap.    Pending Prescriptions:                       Disp   Refills    methylphenidate (RITALIN) 20 MG tablet    60 tab*0            Sig: Take 1 tablet (20 mg) by mouth 2 times daily    Routing refill request to provider for review/approval because:  Drug not on the [facility] refill protocol   Please advise, thanks.

## 2023-07-13 NOTE — TELEPHONE ENCOUNTER
Per message below, patient is asking for prescription to go to a different pharmacy due to current pharmacy does not have medication in stock.    Patient asking if colleague can refill medication today.    Please advise, thanks.

## 2023-07-13 NOTE — TELEPHONE ENCOUNTER
Patient calling saying that the pharmacy that she goes to are out of her medication methylphenidate (RITALIN) 20 MG tablet and needs it to be sent to some where else. Please send medication to [facility] at [place] in [address], [place] [address]. Will forward message to clinic.

## 2023-08-11 ENCOUNTER — MYC REFILL (OUTPATIENT)
Dept: INTERNAL MEDICINE | Facility: CLINIC | Age: 58
End: 2023-08-11
Payer: COMMERCIAL

## 2023-08-11 DIAGNOSIS — F98.8 ATTENTION DEFICIT DISORDER, UNSPECIFIED HYPERACTIVITY PRESENCE: ICD-10-CM

## 2023-08-11 DIAGNOSIS — I10 ESSENTIAL HYPERTENSION: ICD-10-CM

## 2023-08-11 RX ORDER — LISINOPRIL 20 MG/1
20 TABLET ORAL DAILY
Qty: 90 TABLET | Refills: 1 | Status: SHIPPED | OUTPATIENT
Start: 2023-08-11 | End: 2024-02-21

## 2023-08-11 RX ORDER — METHYLPHENIDATE HYDROCHLORIDE 20 MG/1
20 TABLET ORAL 2 TIMES DAILY
Qty: 60 TABLET | Refills: 0 | Status: SHIPPED | OUTPATIENT
Start: 2023-08-12 | End: 2023-09-11

## 2023-08-11 NOTE — TELEPHONE ENCOUNTER
Pending Prescriptions:                       Disp   Refills    methylphenidate (RITALIN) 20 MG tablet     60 tab*0        Sig: Take 1 tablet (20 mg) by mouth 2 times daily    Routing refill request to provider for review/approval because:  Drug not on the [facility] refill protocol     Please advise, thanks.  Routed to covering provider - Inessa Gutierrez.

## 2023-08-11 NOTE — TELEPHONE ENCOUNTER
I do not see CSA signed   Urine tox was ordered 3/23 but not done   Needs to sign CSA for Emanuel and do urine tox before next refill - I will fill today but fill date for tomorrow as earliest fill date

## 2023-08-11 NOTE — TELEPHONE ENCOUNTER
Pending Prescriptions:                       Disp   Refills    lisinopril (ZESTRIL) 20 MG tablet         90 tab*1            Sig: Take 1 tablet (20 mg) by mouth daily    Prescription approved per [facility] Refill Protocol.

## 2023-09-01 ENCOUNTER — APPOINTMENT (OUTPATIENT)
Dept: GENERAL RADIOLOGY | Facility: CLINIC | Age: 58
End: 2023-09-01
Attending: EMERGENCY MEDICINE
Payer: COMMERCIAL

## 2023-09-01 ENCOUNTER — HOSPITAL ENCOUNTER (EMERGENCY)
Facility: CLINIC | Age: 58
Discharge: HOME | End: 2023-09-01
Attending: EMERGENCY MEDICINE | Admitting: EMERGENCY MEDICINE
Payer: COMMERCIAL

## 2023-09-01 VITALS
HEART RATE: 92 BPM | SYSTOLIC BLOOD PRESSURE: 172 MMHG | RESPIRATION RATE: 19 BRPM | BODY MASS INDEX: 28.62 KG/M2 | DIASTOLIC BLOOD PRESSURE: 110 MMHG | OXYGEN SATURATION: 93 % | TEMPERATURE: 97.9 F | WEIGHT: 171.96 LBS

## 2023-09-01 DIAGNOSIS — S43.004A SHOULDER DISLOCATION, RIGHT, INITIAL ENCOUNTER: ICD-10-CM

## 2023-09-01 PROCEDURE — 23650 CLTX SHO DSLC W/MNPJ WO ANES: CPT | Mod: RT

## 2023-09-01 PROCEDURE — 96376 TX/PRO/DX INJ SAME DRUG ADON: CPT

## 2023-09-01 PROCEDURE — 999N000157 HC STATISTIC RCP TIME EA 10 MIN

## 2023-09-01 PROCEDURE — 250N000011 HC RX IP 250 OP 636: Performed by: EMERGENCY MEDICINE

## 2023-09-01 PROCEDURE — 96374 THER/PROPH/DIAG INJ IV PUSH: CPT

## 2023-09-01 PROCEDURE — 96375 TX/PRO/DX INJ NEW DRUG ADDON: CPT

## 2023-09-01 PROCEDURE — 999N000065 XR SHOULDER RIGHT PORT G/E 2 VIEWS: Mod: RT

## 2023-09-01 PROCEDURE — 99285 EMERGENCY DEPT VISIT HI MDM: CPT | Mod: 25

## 2023-09-01 PROCEDURE — 73030 X-RAY EXAM OF SHOULDER: CPT | Mod: RT

## 2023-09-01 PROCEDURE — 73060 X-RAY EXAM OF HUMERUS: CPT | Mod: RT

## 2023-09-01 RX ORDER — PROPOFOL 10 MG/ML
INJECTION, EMULSION INTRAVENOUS DAILY PRN
Status: COMPLETED | OUTPATIENT
Start: 2023-09-01 | End: 2023-09-01

## 2023-09-01 RX ORDER — PROPOFOL 10 MG/ML
1 INJECTION, EMULSION INTRAVENOUS ONCE
Status: DISCONTINUED | OUTPATIENT
Start: 2023-09-01 | End: 2023-09-01 | Stop reason: HOSPADM

## 2023-09-01 RX ORDER — OXYCODONE HYDROCHLORIDE 5 MG/1
5 TABLET ORAL EVERY 6 HOURS PRN
Qty: 12 TABLET | Refills: 0 | Status: SHIPPED | OUTPATIENT
Start: 2023-09-01 | End: 2023-09-04

## 2023-09-01 RX ADMIN — PROPOFOL 50 MG: 10 INJECTION, EMULSION INTRAVENOUS at 03:26

## 2023-09-01 RX ADMIN — PROPOFOL 40 MG: 10 INJECTION, EMULSION INTRAVENOUS at 03:34

## 2023-09-01 RX ADMIN — HYDROMORPHONE HYDROCHLORIDE 1 MG: 1 INJECTION, SOLUTION INTRAMUSCULAR; INTRAVENOUS; SUBCUTANEOUS at 02:29

## 2023-09-01 RX ADMIN — HYDROMORPHONE HYDROCHLORIDE 1 MG: 1 INJECTION, SOLUTION INTRAMUSCULAR; INTRAVENOUS; SUBCUTANEOUS at 01:21

## 2023-09-01 RX ADMIN — PROPOFOL 20 MG: 10 INJECTION, EMULSION INTRAVENOUS at 03:33

## 2023-09-01 RX ADMIN — PROPOFOL 30 MG: 10 INJECTION, EMULSION INTRAVENOUS at 03:29

## 2023-09-01 ASSESSMENT — ACTIVITIES OF DAILY LIVING (ADL)
ADLS_ACUITY_SCORE: 35
ADLS_ACUITY_SCORE: 33

## 2023-09-01 NOTE — DISCHARGE INSTRUCTIONS
Discharge Instructions  Extremity Injury    You were seen today for an injury to an extremity (arm, hand, leg, or foot). You may have a bruise, strain, or fracture (broken bone).    Generally, every Emergency Department visit should have a follow-up clinic visit with either a primary or a specialty clinic/provider. Please follow-up as instructed by your emergency provider today.  Return to the Emergency Department right away if:  Your pain seems to change or get worse or there is pain in a new area that wasn t evaluated today.  Your extremity becomes pale, cool, blue, or numb or tingling past the injury.  You have more drainage, redness or pain in the area of the cut or abrasion.  You have pain that you cannot control with the medicine recommended or prescribed here, or you have pain that seems too much for your injury.  Your child (who is injured) will not stop crying or is much more fussy than normal.  You have new symptoms or anything that worries you.    What to Expect:  Your swelling and pain may be worse the day after your injury, but should not be severe and should start getting better after that. You should not have new symptoms and your pain should not get worse.  You may start to get a bruise over the injured area or below the injured area (bruising can follow gravity).  Your movement and strength should get better with time.  Some injuries may not show up until after you have left the Emergency Department so it is important to follow-up as directed.  Your injury may prevent you from working.  Follow-up with your regular provider to get a work release note.  Pain medications or your injury may make it unsafe to drive or operate machinery.    Home Care:  RICE: Rest, Ice, Compression, Elevation  Rest: Rest your injured area for at least 1-2 days. After that you may start using your extremity again as long as there is not too much pain.   Ice: Apply ice your injured area for 15 minutes at a time, at least 3  times a day. Use a cloth between the ice bag and your skin to prevent frostbite. Do not sleep with an ice pack or heating pad on, since this can cause burns or skin injury.  Compression: You may use an elastic bandage (Ace  Wrap) if it makes you more comfortable. Wrap it just tight enough to provide light compression, like a new pair of socks feels. Loosen the bandage if you have swelling past the bandage.  Elevation: Raise the injured area above the level of your heart as much as possible in the first 1-2 days.    Use Tylenol  (acetaminophen), Motrin (ibuprofen), or Advil  (ibuprofen) for your pain unless you have an allergy or are told not to use these medications by your provider.  Take the medications as instructed on the package. Tylenol  (acetaminophen) is in many prescription medicines and non-prescription medicines--check all of your medicines to be sure you aren t taking more than 3000 mg per day.  Please follow any other instructions that were discussed with you by your provider.    Stretching/Exercises:  You may have been provided with instructions for stretching or exercises. If your injury was to your arm or shoulder and your provider put you in a sling or an immobilizer, it is important that you take off your immobilizer within 3 days and stretch/move your shoulder, unless your provider specifically tells you to not move your shoulder.  This is to prevent further injury such as a  frozen shoulder .     If you were given a prescription for medicine here today, be sure to read all of the information (including the package insert) that comes with your prescription.  This will include important information about the medicine, its side effects, and any warnings that you need to know about.  The pharmacist who fills the prescription can provide more information and answer questions you may have about the medicine.  If you have questions or concerns that the pharmacist cannot address, please call or return to  the Emergency Department.     Remember that you can always come back to the Emergency Department if you are not able to see your regular provider in the amount of time listed above, if you get any new symptoms, or if there is anything that worries you.    Opioid Medication Information    You have been given a prescription for an opioid (narcotic) pain medicine and/or have received a pain medicine while here in the Emergency Department. These medicines can make you drowsy or impaired. You must not drive, operate dangerous equipment, or engage in any other dangerous activities while taking these medications. If you drive while taking these medications, you could be arrested for driving under the influence (DUI). Do not drink any alcohol while you are taking these medications.     Opioid pain medications can cause addiction. If you have a history of chemical dependency of any type, you are at a higher risk of becoming addicted to pain medications.  Only take these prescribed medications to treat your pain when all other options have been tried. Take it for as short a time and as few doses as possible. Store your pain pills in a secure place, as they are frequently stolen and provide a dangerous opportunity for children or visitors in your house to start abusing these powerful medications. We will not replace any lost or stolen medicine.    If you do not finish your medication, it is a good idea to get rid of it but please do not flush it down the toilet. Please dispose of the remaining medication at a local pharmacy or law enforcement facility. The [place] [employer] has additional information on medication disposal: https://www.pca.[facility].[place]/living-green/managing-unwanted-medications.      Many prescription pain medications contain Tylenol  (acetaminophen), including Vicodin , Tylenol #3 , Norco , Lortab , and Percocet .  You should not take any extra pills of Tylenol  if you are using these  prescription medications or you can get very sick.  Do not ever take more than 3000 mg of acetaminophen in any 24 hour period.    All opioids tend to cause constipation. Drink plenty of water and eat foods that have a lot of fiber, such as fruits, vegetables, prune juice, apple juice and high fiber cereal.  Take a laxative if you don t move your bowels at least every other day. Miralax , Milk of Magnesia, Colace , or Senna  can be used to keep you regular.      Discharge Instructions  Procedural Sedation    During your visit today you had Procedural Sedation which is the process used to give you medications in order to make you comfortable or relaxed while a painful or difficult procedure is performed. This might have been a wound repair, fracture reduction ( setting a broken bone ), relocation of a dislocated joint, or other procedure.    The medications used for Procedural Sedation are generally very short-acting so you are being discharged at a time when it is most likely that the medications have completely left your body. It is possible that the medication could cause some persistent symptoms like nausea, drowsiness, dizziness, clumsiness/poor balance, or poor judgment. As a result, please do not drive, operate machinery/tools, or do anything else that requires judgment or coordination for the rest of the day. This could include climbing ladders or other heights, swimming/bathing, exercising, bicycling, or other similar activities. If you are feeling back to normal, you may resume normal activities the following day.    Generally, every Emergency Department visit should have a follow-up clinic visit with either a primary or a specialty clinic/provider. Please follow-up as instructed by your emergency provider today.  Return to the Emergency Department right away if:  You have difficulty breathing.  Your friends or family feel that you are too sleepy/sedated.  You have repeated vomiting.    Home care:  Do not  drink alcohol or take sedating (sleeping) medications.  Take pain medications only as instructed by your provider.  Begin with a light diet (like clear liquids) and add more foods as your stomach tolerates.  If you have vomiting, return to clear liquids.    Follow-up:  Follow-up was recommended based on the condition that you received Procedural Sedation for; follow-up according to the instructions you received from your provider today.      If you were given a prescription for medicine here today, be sure to read all of the information (including the package insert) that comes with your prescription. This will include important information about the medicine, its side effects, and any warnings that you need to know about. The pharmacist who fills the prescription can provide more information and answer questions you may have about the medicine.  If you have questions or concerns that the pharmacist cannot address, please call or return to the Emergency Department.     Remember that you can always come back to the Emergency Department if you are not able to see your regular provider in the amount of time listed above, if you get any new symptoms, or if there is anything that worries you.

## 2023-09-01 NOTE — LETTER
September 1, 2023      To Whom It May Concern:      Jayleen Lang was seen in our Emergency Department today, 09/01/23 for a dislocated shoulder.  She will need to see an orthopedic surgeon in clinic next week.    Sincerely,        Michael Banks MD

## 2023-09-01 NOTE — ED PROVIDER NOTES
History     Chief Complaint:  Shoulder Pain       HPI   Jayleen Lang is a 57 year old female who presents for evaluation of right arm injury after fall.  She is walking downstairs just prior to arrival when she fell.  She caught her right arm in between the railing and twisted.  7 pain in the upper arm and right shoulder.  She not hit her head or lose consciousness.  No other injuries.      Independent Historian:   Spouse/Partner - They report patient acting appropriately    Review of External Notes:         Medications:    ALPRAZolam (XANAX) 0.5 MG tablet  amoxicillin-clavulanate (AUGMENTIN) 875-125 MG tablet  cetirizine (ZYRTEC) 10 MG tablet  EPINEPHrine (ANY BX GENERIC EQUIV) 0.3 MG/0.3ML injection 2-pack  gabapentin (NEURONTIN) 600 MG tablet  gabapentin (NEURONTIN) 600 MG tablet  lisinopril (ZESTRIL) 20 MG tablet  methylphenidate (RITALIN) 20 MG tablet        Past Medical History:    Past Medical History:   Diagnosis Date    ADD (attention deficit disorder)     Hypertension     Obesity        Past Surgical History:    Past Surgical History:   Procedure Laterality Date    ARTHROPLASTY HIP  2017    left hip replaced    ARTHROSCOPIC REPAIR POSTERIOR CRUCIATE LIGAMENT      BUNIONECTOMY Left 2018    Procedure: BUNIONECTOMY;  1.  Closing base osteotomy, first metatarsal, left foot.   2.  Akin osteotomy, left great toe. ;  Surgeon: Eliezer Parra DPM;  Location:  OR     SECTION  10/1998    x2    COLONOSCOPY  06/15/2018    Dr. So [facility]    COLONOSCOPY N/A 6/15/2018    Procedure: COLONOSCOPY;  Colonoscopy ;  Surgeon: Luis Fernando So MD;  Location:  GI    Foot surgery - bone spurs  2002    LAMINECT/DISCECTOMY, CERVICAL      C6-C7 Fusion.    OSTEOTOMY FOOT Left 2018    Procedure: OSTEOTOMY FOOT;;  Surgeon: Eliezer Parra DPM;  Location:  OR        Physical Exam   Patient Vitals for the past 24 hrs:   BP Temp Temp src Pulse Resp SpO2 Weight   23 0054 -- 97.9  F (36.6  C) Temporal  -- -- -- --   09/01/23 0053 (!) 160/131 -- -- 106 22 100 % 78 kg (171 lb 15.3 oz)        Physical Exam  Constitutional: Well appearing.  HEENT: Atraumatic.   Moist mucous membranes.  Neck: Soft.  Supple.  No tenderness to palpation.  Cardiac: Regular rate and rhythm.  No murmur or rub.  Respiratory: Clear to auscultation bilaterally.  No respiratory distress.  No wheezing, rhonchi, or rales.  Abdomen: Soft and nontender.  No guarding.  Nondistended.  Musculoskeletal: There is tenderness with any removal of the right shoulder with no obvious effusion.  There is a divot on the lateral shoulder consistent with dislocation.  Distal radial pulse 2+.  Cap refill less than 3 seconds of the right hand.  No edema.  Normal range of motion.  Neurologic: Alert and oriented x3.  Normal tone and bulk.  No facial drooping.  Normal speech.  5/5 strength in bilateral upper and lower extremities.  Sensation to light touch intact throughout.  Normal gait.  Skin: No rashes.  No edema.  Psych: Normal affect.  Normal behavior.        Emergency Department Course       Imaging:  XR Shoulder Right Port G/E 2vw   Final Result   IMPRESSION: Successful interval closed reduction of previous glenohumeral dislocation. No displaced fracture. The acromioclavicular joint is intact.      XR Shoulder Right G/E 3 Views   Final Result   IMPRESSION: Right glenohumeral joint anterior inferior dislocation. No fracture.      Humerus XR, G/E 2 views, right   Final Result   IMPRESSION: Right glenohumeral joint anterior inferior dislocation. No fracture.         Report per radiology    Laboratory:  Labs Ordered and Resulted from Time of ED Arrival to Time of ED Departure - No data to display     [facility]    -Dislocation - Upper Extremity    Date/Time: 9/1/2023 3:41 AM    Performed by: Michael Banks MD  Authorized by: Michael Banks MD    Risks, benefits and alternatives discussed.    ED EVALUATION:      Assessment Time:  9/1/2023 3:00 AM      I have performed an Emergency Department Evaluation including taking a history and physical examination, this evaluation will be documented in the electronic medical record for this ED encounter.      ASA Class: Class 1- healthy patient    Mallampati: Grade 2- soft palate, base of uvula, tonsillar pillars, and portion of posterior pharyngeal wall visible    UNIVERSAL PROTOCOL   Site Marked: NA  Prior Images Obtained and Reviewed:  Yes  Required items: Required blood products, implants, devices and special equipment available    Patient identity confirmed:  Verbally with patient and arm band  Patient was reevaluated immediately before administering moderate or deep sedation or anesthesia  Confirmation Checklist:  Patient's identity using two indicators, relevant allergies, procedure was appropriate and matched the consent or emergent situation and correct equipment/implants were available  Time out: Immediately prior to the procedure a time out was called    Universal Protocol: the Joint Commission Universal Protocol was followed      LOCATION     Location:  Shoulder    Shoulder location:  R shoulder    Shoulder dislocation type: anterior      Chronicity:  New    PRE PROCEDURE ASSESSMENT      Pre-procedure imaging:  X-ray    Imaging findings: dislocation present      Imaging findings: no fracture      Fracture of greater humeral tuberosity: no      Hill-Sachs deformity: no      Distal perfusion: normal      SEDATION  Patient Sedated: Yes    Sedation:  Propofol  Vital signs: Vital signs monitored during sedation      PROCEDURE DETAILS      Manipulation performed: yes      Shoulder reduction method:  Direct traction and external rotation    Reduction successful: yes      Reduction confirmed with imaging: yes      Immobilization:  Brace    Supplies used:  Sling    POST PROCEDURE DETAILS     Neurological function: normal      Distal perfusion: normal      Range of motion: improved         PROCEDURE    Patient Tolerance:  Patient tolerated the procedure well with no immediate complications  Length of time physician/provider present for 1:1 monitoring during sedation: 10         Emergency Department Course & Assessments:             Interventions:  Medications   HYDROmorphone (DILAUDID) injection 1 mg (1 mg Intravenous $Given 9/1/23 0121)        Assessments:      Independent Interpretation (X-rays, CTs, rhythm strip):  Initial x-ray of the right shoulder and humerus with anterior shoulder dislocation without obvious fracture.  Repeat shoulder x-ray shows interval reduction of the right shoulder.    Consultations/Discussion of Management or Tests:  None        Social Determinants of Health affecting care:   None    Disposition:  The patient was discharged to home.     Impression & Plan      Medical Decision Making:  Jayleen Lang is a 57-year-old woman who is afebrile and hemodynamically stable.  She has obvious right shoulder pain.  She is neurovascularly intact.  X-ray of the right shoulder demonstrates a dislocation.  She underwent procedural sedation with successful reduction of the right shoulder as above.  She is feeling improved.  She go home with a shoulder immobilizer and outpatient orthopedic follow-up.  I gave her pain medication for home and she is counseled on the use of opiate pain medication.  She is in agreements plan and questions were answered.  She has no other traumatic injuries such as no head injuries or neck pain that would necessitate any other emergent imaging.  Her  is present and will take her home.  She is been up and ambulating and eating without difficulty post sedation.  Her questions were answered and she was given very strict return precautions.  She was in no distress at time of discharge.    Diagnosis:    ICD-10-CM    1. Shoulder dislocation, right, initial encounter  S43.004A            Discharge Medications:  Discharge Medication List as of 9/1/2023  4:26  AM        START taking these medications    Details   oxyCODONE (ROXICODONE) 5 MG tablet Take 1 tablet (5 mg) by mouth every 6 hours as needed for pain, Disp-12 tablet, R-0, Local Print              9/1/2023   Michael Banks MD Salay, Nicholas J, MD  09/01/23 0714

## 2023-09-01 NOTE — ED TRIAGE NOTES
Pt arrives with right shoulder and arm pain after falling down 3 steps about 45 minutes PTA. Pt states she got her arm stuck in the railing when she feel. No obvious deformities noted. ABCS intact and Aox4.       Triage Assessment       Row Name 09/01/23 0052       Triage Assessment (Adult)    Airway WDL WDL       Respiratory WDL    Respiratory WDL WDL       Cardiac WDL    Cardiac WDL WDL

## 2023-09-01 NOTE — PROGRESS NOTES
An ETCO2 monitor was placed on the pt with 3LPM bled in. The Ambu bag, suction, and airways were setup and present in the room, but not needed. Pt was able to maintain airway throughout the procedure with no intervention needed. ETCO2 levels were maintained between 26-35.    RT Sierra on 9/1/2023 at 3:53 AM

## 2023-09-02 ENCOUNTER — MYC REFILL (OUTPATIENT)
Dept: INTERNAL MEDICINE | Facility: CLINIC | Age: 58
End: 2023-09-02
Payer: COMMERCIAL

## 2023-09-02 DIAGNOSIS — F41.1 GAD (GENERALIZED ANXIETY DISORDER): ICD-10-CM

## 2023-09-05 RX ORDER — ALPRAZOLAM 0.5 MG
0.5 TABLET ORAL DAILY PRN
Qty: 15 TABLET | Refills: 0 | Status: SHIPPED | OUTPATIENT
Start: 2023-09-05 | End: 2023-10-07

## 2023-09-08 ENCOUNTER — TRANSFERRED RECORDS (OUTPATIENT)
Dept: HEALTH INFORMATION MANAGEMENT | Facility: CLINIC | Age: 58
End: 2023-09-08
Payer: COMMERCIAL

## 2023-09-11 ENCOUNTER — MYC REFILL (OUTPATIENT)
Dept: INTERNAL MEDICINE | Facility: CLINIC | Age: 58
End: 2023-09-11
Payer: COMMERCIAL

## 2023-09-11 DIAGNOSIS — F98.8 ATTENTION DEFICIT DISORDER, UNSPECIFIED HYPERACTIVITY PRESENCE: ICD-10-CM

## 2023-09-11 RX ORDER — METHYLPHENIDATE HYDROCHLORIDE 20 MG/1
20 TABLET ORAL 2 TIMES DAILY
Qty: 60 TABLET | Refills: 0 | Status: SHIPPED | OUTPATIENT
Start: 2023-09-11 | End: 2023-10-09

## 2023-09-12 ENCOUNTER — DOCUMENTATION ONLY (OUTPATIENT)
Dept: HOME HEALTH SERVICES | Facility: CLINIC | Age: 58
End: 2023-09-12
Payer: COMMERCIAL

## 2023-09-12 DIAGNOSIS — S43.084A OTHER DISLOCATION OF RIGHT SHOULDER JOINT, INITIAL ENCOUNTER: Primary | ICD-10-CM

## 2023-09-22 ENCOUNTER — TRANSFERRED RECORDS (OUTPATIENT)
Dept: HEALTH INFORMATION MANAGEMENT | Facility: CLINIC | Age: 58
End: 2023-09-22
Payer: COMMERCIAL

## 2023-09-27 ENCOUNTER — DOCUMENTATION ONLY (OUTPATIENT)
Dept: HOME HEALTH SERVICES | Facility: CLINIC | Age: 58
End: 2023-09-27
Payer: COMMERCIAL

## 2023-09-27 DIAGNOSIS — S43.084A OTHER DISLOCATION OF RIGHT SHOULDER JOINT, INITIAL ENCOUNTER: Primary | ICD-10-CM

## 2023-10-07 ENCOUNTER — MYC REFILL (OUTPATIENT)
Dept: INTERNAL MEDICINE | Facility: CLINIC | Age: 58
End: 2023-10-07
Payer: COMMERCIAL

## 2023-10-07 DIAGNOSIS — F41.1 GAD (GENERALIZED ANXIETY DISORDER): ICD-10-CM

## 2023-10-08 NOTE — TELEPHONE ENCOUNTER
Requested Prescriptions   Pending Prescriptions Disp Refills     ALPRAZolam (XANAX) 0.5 MG tablet 30 tablet 0       There is no refill protocol information for this order        Last Written Prescription Date:  12/26/19  Last Fill Quantity: 30,  # refills: 0   Last office visit: 1/6/2020 with prescribing provider:  1/9/2020   Future Office Visit:       Mr Capellan

## 2023-10-09 DIAGNOSIS — F98.8 ATTENTION DEFICIT DISORDER, UNSPECIFIED HYPERACTIVITY PRESENCE: ICD-10-CM

## 2023-10-09 RX ORDER — ALPRAZOLAM 0.5 MG
0.5 TABLET ORAL DAILY PRN
Qty: 15 TABLET | Refills: 0 | Status: SHIPPED | OUTPATIENT
Start: 2023-10-09 | End: 2023-11-08

## 2023-10-09 RX ORDER — METHYLPHENIDATE HYDROCHLORIDE 20 MG/1
20 TABLET ORAL 2 TIMES DAILY
Qty: 60 TABLET | Refills: 0 | Status: SHIPPED | OUTPATIENT
Start: 2023-10-09 | End: 2023-11-08

## 2023-10-09 NOTE — TELEPHONE ENCOUNTER
Medication Question or Refill        What medication are you calling about (include dose and sig)?: methylphenidate (RITALIN) 20 MG tablet , one tablet by mouth 2 times daily    Preferred Pharmacy:   [id number] - [place], [place] - [address]  [address] [place] [address]  Phone: [phone] Fax: [phone]        Controlled Substance Agreement on file:   CSA -- Patient Level:    CSA: None found at the patient level.       Who prescribed the medication?: Emanuel    Do you need a refill? Yes    When did you use the medication last? Today 10/9    Patient offered an appointment? No    Do you have any questions or concerns?  No      Could we send this information to you in [place] or would you prefer to receive a phone call?:   Patient would prefer a phone call   Okay to leave a detailed message?: Yes at Cell number on file:    Telephone Information:   Mobile [phone]

## 2023-11-08 ENCOUNTER — MYC REFILL (OUTPATIENT)
Dept: INTERNAL MEDICINE | Facility: CLINIC | Age: 58
End: 2023-11-08
Payer: COMMERCIAL

## 2023-11-08 ENCOUNTER — TRANSFERRED RECORDS (OUTPATIENT)
Dept: HEALTH INFORMATION MANAGEMENT | Facility: CLINIC | Age: 58
End: 2023-11-08
Payer: COMMERCIAL

## 2023-11-08 DIAGNOSIS — F41.1 GAD (GENERALIZED ANXIETY DISORDER): ICD-10-CM

## 2023-11-08 DIAGNOSIS — F98.8 ATTENTION DEFICIT DISORDER, UNSPECIFIED HYPERACTIVITY PRESENCE: ICD-10-CM

## 2023-11-08 RX ORDER — ALPRAZOLAM 0.5 MG
0.5 TABLET ORAL DAILY PRN
Qty: 15 TABLET | Refills: 0 | Status: SHIPPED | OUTPATIENT
Start: 2023-11-08 | End: 2023-12-27

## 2023-11-08 RX ORDER — METHYLPHENIDATE HYDROCHLORIDE 20 MG/1
20 TABLET ORAL 2 TIMES DAILY
Qty: 60 TABLET | Refills: 0 | Status: SHIPPED | OUTPATIENT
Start: 2023-11-08 | End: 2023-12-04

## 2023-12-04 ENCOUNTER — MYC REFILL (OUTPATIENT)
Dept: INTERNAL MEDICINE | Facility: CLINIC | Age: 58
End: 2023-12-04
Payer: COMMERCIAL

## 2023-12-04 ENCOUNTER — TRANSFERRED RECORDS (OUTPATIENT)
Dept: HEALTH INFORMATION MANAGEMENT | Facility: CLINIC | Age: 58
End: 2023-12-04
Payer: COMMERCIAL

## 2023-12-04 DIAGNOSIS — F98.8 ATTENTION DEFICIT DISORDER, UNSPECIFIED HYPERACTIVITY PRESENCE: ICD-10-CM

## 2023-12-04 RX ORDER — METHYLPHENIDATE HYDROCHLORIDE 20 MG/1
20 TABLET ORAL 2 TIMES DAILY
Qty: 60 TABLET | Refills: 0 | Status: SHIPPED | OUTPATIENT
Start: 2023-12-04 | End: 2024-01-09

## 2023-12-05 ENCOUNTER — MYC REFILL (OUTPATIENT)
Dept: INTERNAL MEDICINE | Facility: CLINIC | Age: 58
End: 2023-12-05
Payer: COMMERCIAL

## 2023-12-05 DIAGNOSIS — F41.1 GAD (GENERALIZED ANXIETY DISORDER): ICD-10-CM

## 2023-12-06 RX ORDER — ALPRAZOLAM 0.5 MG
0.5 TABLET ORAL DAILY PRN
Qty: 15 TABLET | Refills: 0 | OUTPATIENT
Start: 2023-12-06

## 2023-12-19 NOTE — TELEPHONE ENCOUNTER
Pt sent another [employer] message asking if she can be worked into see Dr Castellanos next week, so she doesn't have to wait to see another provider.     The soonest he would have to schedule is on 1/5.

## 2023-12-25 ENCOUNTER — MYC REFILL (OUTPATIENT)
Dept: INTERNAL MEDICINE | Facility: CLINIC | Age: 58
End: 2023-12-25
Payer: COMMERCIAL

## 2023-12-25 DIAGNOSIS — F41.1 GAD (GENERALIZED ANXIETY DISORDER): ICD-10-CM

## 2023-12-26 RX ORDER — ALPRAZOLAM 0.5 MG
0.5 TABLET ORAL DAILY PRN
Qty: 15 TABLET | Refills: 0 | OUTPATIENT
Start: 2023-12-26

## 2023-12-27 ENCOUNTER — TELEPHONE (OUTPATIENT)
Dept: FAMILY MEDICINE | Facility: CLINIC | Age: 58
End: 2023-12-27

## 2023-12-27 NOTE — TELEPHONE ENCOUNTER
12/27 I called and lm at 8:05 cancelling appt Akilah will not see pt's for med checks needs to go to primary dr, to get refills she is a same day provider only for acute problems

## 2023-12-27 NOTE — TELEPHONE ENCOUNTER
----- Message from Esperanza Pradhan sent at 12/27/2023  7:54 AM CST -----  Regarding: FW: Needs to Schedule with PCP or prescribing provider    ----- Message -----  From: Akilah Tsai APRN CNP  Sent: 12/27/2023   7:16 AM CST  To: [facility]  Subject: Needs to Schedule with PCP or prescribing pr#    This patient is wanting to be seen for a med recheck, but unfortunately this is not something that is appropriate for me to do as a same day provider. Can they please be called and encouraged to schedule an appointment with the PCP for these needs? Thanks so muich!    Akilah Tsai DNP FNP-C  Family Nurse Practitioner - Same Day Provider  [facility]

## 2023-12-28 RX ORDER — ALPRAZOLAM 0.5 MG
0.5 TABLET ORAL DAILY PRN
Qty: 15 TABLET | Refills: 0 | Status: SHIPPED | OUTPATIENT
Start: 2023-12-28 | End: 2024-02-01

## 2024-01-08 ENCOUNTER — TRANSFERRED RECORDS (OUTPATIENT)
Dept: HEALTH INFORMATION MANAGEMENT | Facility: CLINIC | Age: 59
End: 2024-01-08
Payer: COMMERCIAL

## 2024-01-09 ENCOUNTER — MYC REFILL (OUTPATIENT)
Dept: INTERNAL MEDICINE | Facility: CLINIC | Age: 59
End: 2024-01-09
Payer: COMMERCIAL

## 2024-01-09 DIAGNOSIS — F98.8 ATTENTION DEFICIT DISORDER, UNSPECIFIED HYPERACTIVITY PRESENCE: ICD-10-CM

## 2024-01-09 RX ORDER — METHYLPHENIDATE HYDROCHLORIDE 20 MG/1
20 TABLET ORAL 2 TIMES DAILY
Qty: 60 TABLET | Refills: 0 | Status: SHIPPED | OUTPATIENT
Start: 2024-01-09 | End: 2024-02-08

## 2024-01-29 ENCOUNTER — TRANSFERRED RECORDS (OUTPATIENT)
Dept: HEALTH INFORMATION MANAGEMENT | Facility: CLINIC | Age: 59
End: 2024-01-29
Payer: COMMERCIAL

## 2024-02-01 DIAGNOSIS — F41.1 GAD (GENERALIZED ANXIETY DISORDER): ICD-10-CM

## 2024-02-01 RX ORDER — ALPRAZOLAM 0.5 MG
0.5 TABLET ORAL DAILY PRN
Qty: 15 TABLET | Refills: 0 | Status: SHIPPED | OUTPATIENT
Start: 2024-02-01 | End: 2024-03-05

## 2024-02-08 ENCOUNTER — MYC REFILL (OUTPATIENT)
Dept: INTERNAL MEDICINE | Facility: CLINIC | Age: 59
End: 2024-02-08
Payer: COMMERCIAL

## 2024-02-08 DIAGNOSIS — F98.8 ATTENTION DEFICIT DISORDER, UNSPECIFIED HYPERACTIVITY PRESENCE: ICD-10-CM

## 2024-02-08 RX ORDER — METHYLPHENIDATE HYDROCHLORIDE 20 MG/1
20 TABLET ORAL 2 TIMES DAILY
Qty: 60 TABLET | Refills: 0 | Status: SHIPPED | OUTPATIENT
Start: 2024-02-08 | End: 2024-03-05

## 2024-02-09 ENCOUNTER — OFFICE VISIT (OUTPATIENT)
Dept: INTERNAL MEDICINE | Facility: CLINIC | Age: 59
End: 2024-02-09
Payer: COMMERCIAL

## 2024-02-09 VITALS
OXYGEN SATURATION: 97 % | RESPIRATION RATE: 16 BRPM | HEIGHT: 65 IN | WEIGHT: 169.9 LBS | TEMPERATURE: 98.9 F | BODY MASS INDEX: 28.31 KG/M2 | SYSTOLIC BLOOD PRESSURE: 136 MMHG | HEART RATE: 104 BPM | DIASTOLIC BLOOD PRESSURE: 94 MMHG

## 2024-02-09 DIAGNOSIS — I10 ESSENTIAL HYPERTENSION, BENIGN: Primary | ICD-10-CM

## 2024-02-09 DIAGNOSIS — F98.8 ATTENTION DEFICIT DISORDER, UNSPECIFIED HYPERACTIVITY PRESENCE: ICD-10-CM

## 2024-02-09 DIAGNOSIS — E78.5 HYPERLIPIDEMIA LDL GOAL <130: ICD-10-CM

## 2024-02-09 PROCEDURE — 99214 OFFICE O/P EST MOD 30 MIN: CPT | Performed by: INTERNAL MEDICINE

## 2024-02-09 NOTE — PROGRESS NOTES
"  Assessment & Plan     Essential hypertension, benign  Monitor BP, keep low salt diet, assess lab work  Recommended to decrease Ritalin to 20 mg   - Lipid panel reflex to direct LDL Fasting; Future  - CBC with platelets; Future  - Comprehensive metabolic panel (BMP + Alb, Alk Phos, ALT, AST, Total. Bili, TP); Future  - TSH with free T4 reflex; Future  - UA with Microscopic reflex to Culture - lab collect; Future    Hyperlipidemia LDL goal <130  Assess lipids   - Lipid panel reflex to direct LDL Fasting; Future    Attention deficit disorder, unspecified hyperactivity presence  On Ritalin, helps, decrease to 20 mg as BP is elevated             BMI  Estimated body mass index is 28.27 kg/m  as calculated from the following:    Height as of this encounter: 1.651 m (5' 5\").    Weight as of this encounter: 77.1 kg (169 lb 14.4 oz).   Weight management plan: Discussed healthy diet and exercise guidelines      See Patient Instructions    Subjective   Jayleen is a 58 year old, presenting for the following health issues:  Recheck Medication      2/9/2024     7:02 AM   Additional Questions   Roomed by Jacqueline Castellon MA   Accompanied by Herself     HPI       Hyperlipidemia Follow-Up    Are you regularly taking any medication or supplement to lower your cholesterol?   No  Are you having muscle aches or other side effects that you think could be caused by your cholesterol lowering medication?  No    Hypertension Follow-up  Has h/o HTN. on medical treatment. BP has been controlled at home and at work, checks with the school nurse. No side effects from medications. No CP, HA, dizziness. good compliance with medications and low salt diet.    Do you check your blood pressure regularly outside of the clinic? Yes   Are you following a low salt diet? Yes  Are your blood pressures ever more than 140 on the top number (systolic) OR more   than 90 on the bottom number (diastolic), for example 140/90? Yes  How many servings of fruits and " "vegetables do you eat daily?  2-3  On average, how many sweetened beverages do you drink each day (Examples: soda, juice, sweet tea, etc.  Do NOT count diet or artificially sweetened beverages)?   0  How many days per week do you exercise enough to make your heart beat faster? 3 or less  How many minutes a day do you exercise enough to make your heart beat faster? 9 or less  How many days per week do you miss taking your medication? 0    Has ADHD. On Ritalin. Helps with concentration, finishing tasks. No side effects reported.     Had a fall and right shoulder dislocation. Had surgery for rotator cuff tear. In rehab now.       Review of Systems  Constitutional, HEENT, cardiovascular, pulmonary, gi and gu systems are negative, except as otherwise noted.      Objective    BP (!) 136/94 (BP Location: Left arm, Patient Position: Sitting, Cuff Size: Adult Large)   Pulse 104   Temp 98.9  F (37.2  C) (Oral)   Resp 16   Ht 1.651 m (5' 5\")   Wt 77.1 kg (169 lb 14.4 oz)   LMP  (LMP Unknown)   SpO2 97%   BMI 28.27 kg/m    Body mass index is 28.27 kg/m .  Physical Exam   GENERAL: alert and no distress  NECK: no adenopathy, no asymmetry, masses, or scars  RESP: lungs clear to auscultation - no rales, rhonchi or wheezes  CV: regular rate and rhythm, normal S1 S2, no S3 or S4, no murmur, click or rub, no peripheral edema  ABDOMEN: soft, nontender, no hepatosplenomegaly, no masses and bowel sounds normal  MS: no gross musculoskeletal defects noted, no edema    Office Visit on 07/11/2023   Component Date Value Ref Range Status    Interpretation 07/11/2023 Negative for Intraepithelial Lesion or Malignancy (NILM)    Final    Comment 07/11/2023    Final                    Value:This result contains rich text formatting which cannot be displayed here.    Specimen Adequacy 07/11/2023 Satisfactory for evaluation, endocervical/transformation zone component absent   Final    Clinical Information 07/11/2023    Final                   "  Value:This result contains rich text formatting which cannot be displayed here.    Reflex Testing 07/11/2023 Yes regardless of result   Final    Previous Abnormal? 07/11/2023    Final                    Value:This result contains rich text formatting which cannot be displayed here.    Performing Labs 07/11/2023    Final                    Value:This result contains rich text formatting which cannot be displayed here.    Other HR HPV 07/11/2023 Negative  Negative Final    HPV16 DNA 07/11/2023 Negative  Negative Final    HPV18 DNA 07/11/2023 Negative  Negative Final    FINAL DIAGNOSIS 07/11/2023    Final                    Value:This result contains rich text formatting which cannot be displayed here.           Signed Electronically by: New Castellnaos MD

## 2024-02-21 ENCOUNTER — MYC REFILL (OUTPATIENT)
Dept: INTERNAL MEDICINE | Facility: CLINIC | Age: 59
End: 2024-02-21
Payer: COMMERCIAL

## 2024-02-21 DIAGNOSIS — I10 ESSENTIAL HYPERTENSION: ICD-10-CM

## 2024-02-21 RX ORDER — LISINOPRIL 20 MG/1
20 TABLET ORAL DAILY
Qty: 90 TABLET | Refills: 1 | Status: SHIPPED | OUTPATIENT
Start: 2024-02-21 | End: 2024-02-24

## 2024-02-24 RX ORDER — LISINOPRIL 20 MG/1
20 TABLET ORAL DAILY
Qty: 90 TABLET | Refills: 1 | Status: SHIPPED | OUTPATIENT
Start: 2024-02-24

## 2024-02-24 NOTE — TELEPHONE ENCOUNTER
Spoke to patient to inform of pharmacy transmission error. Patient requests medication sent to [facility]  knob in [place].     Medication resent as requested.     Tracy SANCHEZ RN, BSN  [facility]  [phone]

## 2024-03-05 ENCOUNTER — MYC REFILL (OUTPATIENT)
Dept: INTERNAL MEDICINE | Facility: CLINIC | Age: 59
End: 2024-03-05
Payer: COMMERCIAL

## 2024-03-05 DIAGNOSIS — F41.1 GAD (GENERALIZED ANXIETY DISORDER): ICD-10-CM

## 2024-03-05 DIAGNOSIS — F98.8 ATTENTION DEFICIT DISORDER, UNSPECIFIED HYPERACTIVITY PRESENCE: ICD-10-CM

## 2024-03-05 RX ORDER — METHYLPHENIDATE HYDROCHLORIDE 20 MG/1
20 TABLET ORAL 2 TIMES DAILY
Qty: 60 TABLET | Refills: 0 | Status: SHIPPED | OUTPATIENT
Start: 2024-03-05 | End: 2024-04-01

## 2024-03-05 RX ORDER — ALPRAZOLAM 0.5 MG
0.5 TABLET ORAL DAILY PRN
Qty: 15 TABLET | Refills: 0 | Status: SHIPPED | OUTPATIENT
Start: 2024-03-05 | End: 2024-04-03

## 2024-03-07 ENCOUNTER — MYC REFILL (OUTPATIENT)
Dept: INTERNAL MEDICINE | Facility: CLINIC | Age: 59
End: 2024-03-07
Payer: COMMERCIAL

## 2024-03-07 DIAGNOSIS — F98.8 ATTENTION DEFICIT DISORDER, UNSPECIFIED HYPERACTIVITY PRESENCE: ICD-10-CM

## 2024-03-07 RX ORDER — METHYLPHENIDATE HYDROCHLORIDE 20 MG/1
20 TABLET ORAL 2 TIMES DAILY
Qty: 60 TABLET | Refills: 0 | Status: CANCELLED | OUTPATIENT
Start: 2024-03-07

## 2024-03-07 NOTE — TELEPHONE ENCOUNTER
Pending Prescriptions:                       Disp   Refills    methylphenidate (RITALIN) 20 MG tablet    60 tab*0            Sig: Take 1 tablet (20 mg) by mouth 2 times daily    Duplicate.  Patient informed via [employer].

## 2024-03-08 ENCOUNTER — TELEPHONE (OUTPATIENT)
Dept: INTERNAL MEDICINE | Facility: CLINIC | Age: 59
End: 2024-03-08
Payer: COMMERCIAL

## 2024-03-08 NOTE — TELEPHONE ENCOUNTER
Reason for call:  Medication   If this is a refill request, has the caller requested the refill from the pharmacy already? Yes  Will the patient be using a [facility]? No  Name of the pharmacy and phone number for the current request:   On file [facility] on [address]    Name of the medication requested:   methylphenidate (RITALIN) 20 MG tablet      Other request: PT requested via [employer] that denied it, she will be out Saturday    Phone number to reach patient:  Cell number on file:    Telephone Information:   Mobile [phone]       Best Time:  any    Can we leave a detailed message on this number?  YES    Travel screening: Not Applicable

## 2024-03-08 NOTE — TELEPHONE ENCOUNTER
Prescription was E-Scribed on 3/5/2024, see encounters dated 3/5/2024 and 3/7/2024. Left voicemail for patient to call back.

## 2024-04-01 ENCOUNTER — MYC REFILL (OUTPATIENT)
Dept: INTERNAL MEDICINE | Facility: CLINIC | Age: 59
End: 2024-04-01
Payer: COMMERCIAL

## 2024-04-01 DIAGNOSIS — F98.8 ATTENTION DEFICIT DISORDER, UNSPECIFIED HYPERACTIVITY PRESENCE: ICD-10-CM

## 2024-04-01 RX ORDER — METHYLPHENIDATE HYDROCHLORIDE 20 MG/1
20 TABLET ORAL 2 TIMES DAILY
Qty: 60 TABLET | Refills: 0 | Status: SHIPPED | OUTPATIENT
Start: 2024-04-01 | End: 2024-05-01

## 2024-04-03 ENCOUNTER — MYC REFILL (OUTPATIENT)
Dept: INTERNAL MEDICINE | Facility: CLINIC | Age: 59
End: 2024-04-03
Payer: COMMERCIAL

## 2024-04-03 DIAGNOSIS — F41.1 GAD (GENERALIZED ANXIETY DISORDER): ICD-10-CM

## 2024-04-04 RX ORDER — ALPRAZOLAM 0.5 MG
0.5 TABLET ORAL DAILY PRN
Qty: 15 TABLET | Refills: 0 | Status: SHIPPED | OUTPATIENT
Start: 2024-04-04 | End: 2024-05-01

## 2024-04-05 DIAGNOSIS — G62.9 NEUROPATHY: ICD-10-CM

## 2024-04-05 RX ORDER — GABAPENTIN 600 MG/1
600 TABLET ORAL 3 TIMES DAILY
Qty: 270 TABLET | Refills: 0 | Status: SHIPPED | OUTPATIENT
Start: 2024-04-05 | End: 2024-09-11

## 2024-04-12 ENCOUNTER — TELEPHONE (OUTPATIENT)
Dept: INTERNAL MEDICINE | Facility: CLINIC | Age: 59
End: 2024-04-12

## 2024-04-12 ENCOUNTER — E-VISIT (OUTPATIENT)
Dept: URGENT CARE | Facility: CLINIC | Age: 59
End: 2024-04-12
Payer: COMMERCIAL

## 2024-04-12 ENCOUNTER — E-VISIT (OUTPATIENT)
Dept: INTERNAL MEDICINE | Facility: CLINIC | Age: 59
End: 2024-04-12
Payer: COMMERCIAL

## 2024-04-12 DIAGNOSIS — J01.90 ACUTE NON-RECURRENT SINUSITIS, UNSPECIFIED LOCATION: Primary | ICD-10-CM

## 2024-04-12 DIAGNOSIS — J01.01 ACUTE RECURRENT MAXILLARY SINUSITIS: Primary | ICD-10-CM

## 2024-04-12 DIAGNOSIS — J01.90 ACUTE SINUSITIS WITH SYMPTOMS > 10 DAYS: ICD-10-CM

## 2024-04-12 PROCEDURE — 99421 OL DIG E/M SVC 5-10 MIN: CPT | Performed by: INTERNAL MEDICINE

## 2024-04-12 PROCEDURE — 99207 PR NON-BILLABLE SERV PER CHARTING: CPT | Performed by: PHYSICIAN ASSISTANT

## 2024-04-12 RX ORDER — AZITHROMYCIN 250 MG/1
TABLET, FILM COATED ORAL
Qty: 6 TABLET | Refills: 0 | Status: SHIPPED | OUTPATIENT
Start: 2024-04-12 | End: 2024-04-17

## 2024-04-12 NOTE — TELEPHONE ENCOUNTER
S-(situation): Patient requesting z-pack for possible sinus infection     B-(background): Patient reports being seen by UC a few days ago for sinus infection, was not prescribed anything at the time     A-(assessment): patient reports vertigo is off, denies fever, teeth hurts, head hurts, patient is adamant she has a sinus infection and would like z-pack. Writer recommended she needs some sort of visit, patient says evisits don't work, writer unable to find VV for today within clinic- patient would like message sent to PCP     R-(recommendations): Routing to PCP for review.    Kiki CALDERON

## 2024-04-12 NOTE — PATIENT INSTRUCTIONS
Dear Jayleen Lang,    After reviewing your responses, I've been able to diagnose you with?a sinus infection caused by a virus.?The symptoms you describe suggest a viral cause, which is much more common than a bacterial cause. Antibiotics will treat bacterial infections, but have no effect on viral infections.     Symptomatic care will help you feel better while your body is fighting the virus. It is important to stay well hydrated and get lots of rest. Unless you have been told not to take these medications by your provider, I recommend taking these to help ease your symptoms:    - Flonase (fluticasone) nasal spray, 2 sprays in each nostril daily, to reduce swelling in your nasal passages  - A decongestant like Sudafed (pseudoephedrine) which is available behind the pharmacist counter and helps to relieve congestion  - Tylenol or an NSAID such as ibuprofen or naproxen as needed for pain/fever  - A nasal rinse such as the Netti pot with bottled or distilled water and saline packets helps to flush sinuses  - Mucinex (guiafenesin) which thins mucus and may help it to loosen more quickly    You can also sit in the bathroom with the door closed and hot shower running to loosen mucus.    If your symptoms significantly worsen, you develop a severe headache, vomiting, high fever (>102F) or have not improved by day 10, please contact your primary care provider for an appointment or visit any of our convenient Walk-in Care or Urgent Care Centers to be seen which can be found on our website?here.?     Thanks again for choosing?us?as your health care partner,?   ?  Dian Rodriguez PA-C?

## 2024-04-29 ENCOUNTER — LAB (OUTPATIENT)
Dept: LAB | Facility: CLINIC | Age: 59
End: 2024-04-29
Payer: COMMERCIAL

## 2024-04-29 DIAGNOSIS — I10 ESSENTIAL HYPERTENSION, BENIGN: ICD-10-CM

## 2024-04-29 DIAGNOSIS — E78.5 HYPERLIPIDEMIA LDL GOAL <130: ICD-10-CM

## 2024-04-29 LAB
ALBUMIN SERPL BCG-MCNC: 4.4 G/DL (ref 3.5–5.2)
ALP SERPL-CCNC: 70 U/L (ref 40–150)
ALT SERPL W P-5'-P-CCNC: 33 U/L (ref 0–50)
ANION GAP SERPL CALCULATED.3IONS-SCNC: 11 MMOL/L (ref 7–15)
AST SERPL W P-5'-P-CCNC: 24 U/L (ref 0–45)
BILIRUB SERPL-MCNC: 0.3 MG/DL
BUN SERPL-MCNC: 14 MG/DL (ref 6–20)
CALCIUM SERPL-MCNC: 9.3 MG/DL (ref 8.6–10)
CHLORIDE SERPL-SCNC: 103 MMOL/L (ref 98–107)
CHOLEST SERPL-MCNC: 172 MG/DL
CREAT SERPL-MCNC: 0.93 MG/DL (ref 0.51–0.95)
DEPRECATED HCO3 PLAS-SCNC: 24 MMOL/L (ref 22–29)
EGFRCR SERPLBLD CKD-EPI 2021: 72 ML/MIN/1.73M2
ERYTHROCYTE [DISTWIDTH] IN BLOOD BY AUTOMATED COUNT: 13.5 % (ref 10–15)
FASTING STATUS PATIENT QL REPORTED: YES
GLUCOSE SERPL-MCNC: 96 MG/DL (ref 70–99)
HCT VFR BLD AUTO: 43.7 % (ref 35–47)
HDLC SERPL-MCNC: 86 MG/DL
HGB BLD-MCNC: 14.5 G/DL (ref 11.7–15.7)
LDLC SERPL CALC-MCNC: 76 MG/DL
MCH RBC QN AUTO: 30.5 PG (ref 26.5–33)
MCHC RBC AUTO-ENTMCNC: 33.2 G/DL (ref 31.5–36.5)
MCV RBC AUTO: 92 FL (ref 78–100)
NONHDLC SERPL-MCNC: 86 MG/DL
PLATELET # BLD AUTO: 262 10E3/UL (ref 150–450)
POTASSIUM SERPL-SCNC: 4.1 MMOL/L (ref 3.4–5.3)
PROT SERPL-MCNC: 6.9 G/DL (ref 6.4–8.3)
RBC # BLD AUTO: 4.75 10E6/UL (ref 3.8–5.2)
SODIUM SERPL-SCNC: 138 MMOL/L (ref 135–145)
TRIGL SERPL-MCNC: 52 MG/DL
TSH SERPL DL<=0.005 MIU/L-ACNC: 1.2 UIU/ML (ref 0.3–4.2)
WBC # BLD AUTO: 5.9 10E3/UL (ref 4–11)

## 2024-04-29 PROCEDURE — 84443 ASSAY THYROID STIM HORMONE: CPT

## 2024-04-29 PROCEDURE — 80061 LIPID PANEL: CPT

## 2024-04-29 PROCEDURE — 85027 COMPLETE CBC AUTOMATED: CPT

## 2024-04-29 PROCEDURE — 80053 COMPREHEN METABOLIC PANEL: CPT

## 2024-04-29 PROCEDURE — 36415 COLL VENOUS BLD VENIPUNCTURE: CPT

## 2024-05-01 ENCOUNTER — MYC REFILL (OUTPATIENT)
Dept: INTERNAL MEDICINE | Facility: CLINIC | Age: 59
End: 2024-05-01
Payer: COMMERCIAL

## 2024-05-01 DIAGNOSIS — F98.8 ATTENTION DEFICIT DISORDER, UNSPECIFIED HYPERACTIVITY PRESENCE: ICD-10-CM

## 2024-05-01 DIAGNOSIS — F41.1 GAD (GENERALIZED ANXIETY DISORDER): ICD-10-CM

## 2024-05-01 RX ORDER — METHYLPHENIDATE HYDROCHLORIDE 20 MG/1
20 TABLET ORAL 2 TIMES DAILY
Qty: 60 TABLET | Refills: 0 | Status: SHIPPED | OUTPATIENT
Start: 2024-05-01 | End: 2024-05-28

## 2024-05-01 RX ORDER — ALPRAZOLAM 0.5 MG
0.5 TABLET ORAL DAILY PRN
Qty: 15 TABLET | Refills: 0 | Status: SHIPPED | OUTPATIENT
Start: 2024-05-01 | End: 2024-05-28

## 2024-05-20 ENCOUNTER — MYC REFILL (OUTPATIENT)
Dept: INTERNAL MEDICINE | Facility: CLINIC | Age: 59
End: 2024-05-20
Payer: COMMERCIAL

## 2024-05-20 DIAGNOSIS — T78.40XD ALLERGIC REACTION, SUBSEQUENT ENCOUNTER: ICD-10-CM

## 2024-05-20 RX ORDER — EPINEPHRINE 0.3 MG/.3ML
INJECTION SUBCUTANEOUS
Qty: 2 EACH | Refills: 1 | Status: SHIPPED | OUTPATIENT
Start: 2024-05-20 | End: 2024-07-21

## 2024-05-28 ENCOUNTER — MYC REFILL (OUTPATIENT)
Dept: INTERNAL MEDICINE | Facility: CLINIC | Age: 59
End: 2024-05-28
Payer: COMMERCIAL

## 2024-05-28 DIAGNOSIS — F41.1 GAD (GENERALIZED ANXIETY DISORDER): ICD-10-CM

## 2024-05-28 DIAGNOSIS — F98.8 ATTENTION DEFICIT DISORDER, UNSPECIFIED HYPERACTIVITY PRESENCE: ICD-10-CM

## 2024-05-28 RX ORDER — ALPRAZOLAM 0.5 MG
0.5 TABLET ORAL DAILY PRN
Qty: 15 TABLET | Refills: 0 | Status: SHIPPED | OUTPATIENT
Start: 2024-05-28 | End: 2024-06-25

## 2024-05-28 RX ORDER — METHYLPHENIDATE HYDROCHLORIDE 20 MG/1
20 TABLET ORAL 2 TIMES DAILY
Qty: 60 TABLET | Refills: 0 | Status: SHIPPED | OUTPATIENT
Start: 2024-05-28 | End: 2024-06-25

## 2024-06-08 NOTE — TELEPHONE ENCOUNTER
Hydrochlorothiazide      Last Written Prescription Date: 06/03/16  Last Fill Quantity: 90, # refills: 2  Last Office Visit with [facility] or [facility] prescribing provider: 11/14/16  Next 5 appointments (look out 90 days)     Jul 19, 2017  3:00 PM CDT   Office Visit with Kihsor Mosher MD   [facility] [place] ([facility]    [address]  [address]  [place] [address]   [phone]                   Potassium   Date Value Ref Range Status   08/22/2016 4.1 3.4 - 5.3 mmol/L Final     Creatinine   Date Value Ref Range Status   08/22/2016 0.86 0.52 - 1.04 mg/dL Final     BP Readings from Last 3 Encounters:   11/14/16 122/62   08/29/16 120/70   08/22/16 120/78     Lisinopril      Last Written Prescription Date: 06/03/16  Last Fill Quantity: 90, # refills: 2  Last Office Visit with [facility], [facility] or [facility] prescribing provider: 11/14/16  Next 5 appointments (look out 90 days)     Jul 19, 2017  3:00 PM CDT   Office Visit with Kishor Mosher MD   [facility] [place] ([facility]    [address]  [address]  [place] [address] [phone]                   Potassium   Date Value Ref Range Status   08/22/2016 4.1 3.4 - 5.3 mmol/L Final     Creatinine   Date Value Ref Range Status   08/22/2016 0.86 0.52 - 1.04 mg/dL Final     BP Readings from Last 3 Encounters:   11/14/16 122/62   08/29/16 120/70   08/22/16 120/78        Oriented - self; Oriented - place; Oriented - time

## 2024-06-25 ENCOUNTER — OFFICE VISIT (OUTPATIENT)
Dept: INTERNAL MEDICINE | Facility: CLINIC | Age: 59
End: 2024-06-25
Payer: COMMERCIAL

## 2024-06-25 ENCOUNTER — MYC REFILL (OUTPATIENT)
Dept: INTERNAL MEDICINE | Facility: CLINIC | Age: 59
End: 2024-06-25

## 2024-06-25 VITALS
BODY MASS INDEX: 29.37 KG/M2 | RESPIRATION RATE: 14 BRPM | DIASTOLIC BLOOD PRESSURE: 82 MMHG | TEMPERATURE: 98.6 F | SYSTOLIC BLOOD PRESSURE: 127 MMHG | HEART RATE: 103 BPM | OXYGEN SATURATION: 98 % | WEIGHT: 172 LBS | HEIGHT: 64 IN

## 2024-06-25 DIAGNOSIS — Z78.0 MENOPAUSE: ICD-10-CM

## 2024-06-25 DIAGNOSIS — Z00.00 ENCOUNTER FOR PREVENTATIVE ADULT HEALTH CARE EXAMINATION: Primary | ICD-10-CM

## 2024-06-25 DIAGNOSIS — I10 ESSENTIAL HYPERTENSION, BENIGN: ICD-10-CM

## 2024-06-25 DIAGNOSIS — F98.8 ATTENTION DEFICIT DISORDER, UNSPECIFIED HYPERACTIVITY PRESENCE: ICD-10-CM

## 2024-06-25 DIAGNOSIS — Z79.899 CONTROLLED SUBSTANCE AGREEMENT SIGNED: ICD-10-CM

## 2024-06-25 DIAGNOSIS — F41.1 GAD (GENERALIZED ANXIETY DISORDER): ICD-10-CM

## 2024-06-25 DIAGNOSIS — Z12.31 ENCOUNTER FOR SCREENING MAMMOGRAM FOR BREAST CANCER: ICD-10-CM

## 2024-06-25 DIAGNOSIS — Z12.11 COLON CANCER SCREENING: ICD-10-CM

## 2024-06-25 PROCEDURE — 99396 PREV VISIT EST AGE 40-64: CPT | Performed by: INTERNAL MEDICINE

## 2024-06-25 PROCEDURE — G0481 DRUG TEST DEF 8-14 CLASSES: HCPCS | Performed by: INTERNAL MEDICINE

## 2024-06-25 PROCEDURE — 99213 OFFICE O/P EST LOW 20 MIN: CPT | Mod: 25 | Performed by: INTERNAL MEDICINE

## 2024-06-25 RX ORDER — METHYLPHENIDATE HYDROCHLORIDE 20 MG/1
20 TABLET ORAL 2 TIMES DAILY
Qty: 60 TABLET | Refills: 0 | Status: SHIPPED | OUTPATIENT
Start: 2024-06-25 | End: 2024-07-21

## 2024-06-25 SDOH — HEALTH STABILITY: PHYSICAL HEALTH: ON AVERAGE, HOW MANY DAYS PER WEEK DO YOU ENGAGE IN MODERATE TO STRENUOUS EXERCISE (LIKE A BRISK WALK)?: 4 DAYS

## 2024-06-25 SDOH — HEALTH STABILITY: PHYSICAL HEALTH: ON AVERAGE, HOW MANY MINUTES DO YOU ENGAGE IN EXERCISE AT THIS LEVEL?: 40 MIN

## 2024-06-25 ASSESSMENT — SOCIAL DETERMINANTS OF HEALTH (SDOH): HOW OFTEN DO YOU GET TOGETHER WITH FRIENDS OR RELATIVES?: THREE TIMES A WEEK

## 2024-06-25 ASSESSMENT — PAIN SCALES - GENERAL: PAINLEVEL: NO PAIN (0)

## 2024-06-25 NOTE — PROGRESS NOTES
Preventive Care Visit  [facility]  New Castellanos MD, Internal Medicine  Jun 25, 2024      Assessment & Plan     Encounter for preventative adult health care examination  advised regular aerobic activity, low cholesterol, low salt diet, wearing seat belt,  self examinations, sunscreen protection.Obtain screening cholesterol, immunizations reviewed.    - [id number] - Urine Drug Confirmation Panel (Comprehensive)  - MA Screen Bilateral w/Avery; Future  - DX Bone Density; Future  - Colonoscopy Screening  Referral; Future    Menopause    - DX Bone Density; Future    Encounter for screening mammogram for breast cancer    - MA Screen Bilateral w/Avery; Future    Colon cancer screening    - Colonoscopy Screening  Referral; Future    Controlled substance agreement signed    - [id number] - Urine Drug Confirmation Panel (Comprehensive)    Essential hypertension, benign  Controlled on treatment   - OFFICE/OUTPT VISIT,EST,LEVL III    Attention deficit disorder, unspecified hyperactivity presence  On treatment, discussed side effects   - OFFICE/OUTPT VISIT,EST,LEVL III    REBEKAH (generalized anxiety disorder)  On PRN Xanax, advised for side effects.   - OFFICE/OUTPT VISIT,EST,LEVL III    Patient has been advised of split billing requirements and indicates understanding: Yes        Counseling  Appropriate preventive services were discussed with this patient, including applicable screening as appropriate for fall prevention, nutrition, physical activity, Tobacco-use cessation, weight loss and cognition.  Checklist reviewing preventive services available has been given to the patient.  Reviewed patient's diet, addressing concerns and/or questions.       See Patient Instructions    Alexa Clemens is a 58 year old, presenting for the following:  Physical        6/25/2024     3:43 PM   Additional Questions   Roomed by Viki PLASCENCIA   Accompanied by n/a        Health Care Directive  Patient does not  have a Health Care Directive or Living Will: Discussed advance care planning with patient; however, patient declined at this time.    HPI    Has h/o HTN. on medical treatment. BP has been controlled. No side effects from medications. No CP, HA, dizziness. good compliance with medications and low salt diet.  Has h/o anxiety, has had increased symptoms with stress , on PRN Xanax. Helps.   Has h/o ADHD, on stimulant treatment. Helps with concentration, finishing tasks.               6/25/2024   General Health   How would you rate your overall physical health? Good   Feel stress (tense, anxious, or unable to sleep) To some extent      (!) STRESS CONCERN      6/25/2024   Nutrition   Three or more servings of calcium each day? Yes   Diet: Low salt   How many servings of fruit and vegetables per day? (!) 2-3   How many sweetened beverages each day? 0-1            6/25/2024   Exercise   Days per week of moderate/strenous exercise 4 days   Average minutes spent exercising at this level 40 min            6/25/2024   Social Factors   Frequency of gathering with friends or relatives Three times a week   Worry food won't last until get money to buy more No   Food not last or not have enough money for food? No   Do you have housing? (Housing is defined as stable permanent housing and does not include staying ouside in a car, in a tent, in an abandoned building, in an overnight shelter, or couch-surfing.) Patient declined   Are you worried about losing your housing? Patient declined   Lack of transportation? Patient declined   Unable to get utilities (heat,electricity)? Patient declined            6/25/2024   Fall Risk   Fallen 2 or more times in the past year? No   Trouble with walking or balance? No             6/25/2024   Dental   Dentist two times every year? Yes            6/25/2024   TB Screening   Were you born outside of the [place]? No              Today's PHQ-2 Score:       2/9/2024     7:00 AM   PHQ-2 ( 1999 [employer])   Q1:  Little interest or pleasure in doing things 0   Q2: Feeling down, depressed or hopeless 0   PHQ-2 Score 0         6/25/2024   Substance Use   Alcohol more than 3/day or more than 7/wk No   Do you use any other substances recreationally? No        Social History     Tobacco Use    Smoking status: Never    Smokeless tobacco: Never   Vaping Use    Vaping status: Never Used   Substance Use Topics    Alcohol use: Yes     Comment: 2 -3  beers a week    Drug use: No           5/1/2023   LAST FHS-7 RESULTS   1st degree relative breast or ovarian cancer Yes   Any relative bilateral breast cancer No   Any male have breast cancer No   Any ONE woman have BOTH breast AND ovarian cancer No   Any woman with breast cancer before 50yrs No   2 or more relatives with breast AND/OR ovarian cancer No   2 or more relatives with breast AND/OR bowel cancer No           Mammogram Screening - Mammogram every 1-2 years updated in Health Maintenance based on mutual decision making        6/25/2024   STI Screening   New sexual partner(s) since last STI/HIV test? No        History of abnormal Pap smear: No - age 30- 64 PAP with HPV every 5 years recommended        Latest Ref Rng & Units 7/11/2023     9:30 AM 7/5/2022    11:30 AM 8/22/2016     7:15 AM   PAP / HPV   PAP  Negative for Intraepithelial Lesion or Malignancy (NILM)  Negative for Intraepithelial Lesion or Malignancy (NILM)     PAP (Historical)    NIL    HPV 16 DNA Negative Negative  Negative     HPV 18 DNA Negative Negative  Negative     Other HR HPV Negative Negative  Negative       ASCVD Risk   The 10-year ASCVD risk score (Jose NAJERA, et al., 2019) is: 1.7%    Values used to calculate the score:      Age: 58 years      Sex: Female      Is Non- : No      Diabetic: No      Tobacco smoker: No      Systolic Blood Pressure: 127 mmHg      Is BP treated: Yes      HDL Cholesterol: 86 mg/dL      Total Cholesterol: 172 mg/dL           Reviewed and updated as  "needed this visit by Provider                    Lab work is in process  Labs reviewed in EPIC      Review of Systems  Constitutional, HEENT, cardiovascular, pulmonary, GI, , musculoskeletal, neuro, skin, endocrine and psych systems are negative, except as otherwise noted.     Objective    Exam  /82 (BP Location: Right arm, Cuff Size: Adult Regular)   Pulse 103   Temp 98.6  F (37  C) (Tympanic)   Resp 14   Ht 1.626 m (5' 4\")   Wt 78 kg (172 lb)   LMP  (LMP Unknown)   SpO2 98%   BMI 29.52 kg/m     Estimated body mass index is 29.52 kg/m  as calculated from the following:    Height as of this encounter: 1.626 m (5' 4\").    Weight as of this encounter: 78 kg (172 lb).    Physical Exam  GENERAL: alert and no distress  EYES: Eyes grossly normal to inspection, PERRL and conjunctivae and sclerae normal  HENT: ear canals and TM's normal, nose and mouth without ulcers or lesions  NECK: no adenopathy, no asymmetry, masses, or scars  RESP: lungs clear to auscultation - no rales, rhonchi or wheezes  CV: regular rate and rhythm, normal S1 S2, no S3 or S4, no murmur, click or rub, no peripheral edema  ABDOMEN: soft, nontender, no hepatosplenomegaly, no masses and bowel sounds normal  MS: no gross musculoskeletal defects noted, no edema  SKIN: no suspicious lesions or rashes  NEURO: Normal strength and tone, mentation intact and speech normal  PSYCH: mentation appears normal, affect normal/bright        Signed Electronically by: New Castellanos MD    "

## 2024-06-26 LAB — CREAT UR-MCNC: 113 MG/DL

## 2024-06-26 RX ORDER — METHYLPHENIDATE HYDROCHLORIDE 20 MG/1
20 TABLET ORAL 2 TIMES DAILY
Qty: 60 TABLET | Refills: 0 | OUTPATIENT
Start: 2024-06-26

## 2024-06-26 RX ORDER — ALPRAZOLAM 0.5 MG
0.5 TABLET ORAL DAILY PRN
Qty: 15 TABLET | Refills: 0 | Status: SHIPPED | OUTPATIENT
Start: 2024-06-26 | End: 2024-07-21

## 2024-06-28 LAB
GABAPENTIN UR QL CFM: PRESENT
ME-PHENIDATE UR CFM-MCNC: 283 NG/ML
ME-PHENIDATE UR CFM-MCNC: ABNORMAL NG/ML
ME-PHENIDATE/CREAT UR: 250 NG/MG {CREAT}
ME-PHENIDATE/CREAT UR: ABNORMAL

## 2024-07-21 ENCOUNTER — MYC REFILL (OUTPATIENT)
Dept: INTERNAL MEDICINE | Facility: CLINIC | Age: 59
End: 2024-07-21
Payer: COMMERCIAL

## 2024-07-21 DIAGNOSIS — F41.1 GAD (GENERALIZED ANXIETY DISORDER): ICD-10-CM

## 2024-07-21 DIAGNOSIS — F98.8 ATTENTION DEFICIT DISORDER, UNSPECIFIED HYPERACTIVITY PRESENCE: ICD-10-CM

## 2024-07-21 DIAGNOSIS — T78.40XD ALLERGIC REACTION, SUBSEQUENT ENCOUNTER: ICD-10-CM

## 2024-07-22 RX ORDER — ALPRAZOLAM 0.5 MG
0.5 TABLET ORAL DAILY PRN
Qty: 15 TABLET | Refills: 0 | Status: SHIPPED | OUTPATIENT
Start: 2024-07-22 | End: 2024-08-20

## 2024-07-22 RX ORDER — METHYLPHENIDATE HYDROCHLORIDE 20 MG/1
20 TABLET ORAL 2 TIMES DAILY
Qty: 60 TABLET | Refills: 0 | Status: SHIPPED | OUTPATIENT
Start: 2024-07-26 | End: 2024-08-20

## 2024-07-22 RX ORDER — EPINEPHRINE 0.3 MG/.3ML
INJECTION SUBCUTANEOUS
Qty: 2 EACH | Refills: 1 | Status: SHIPPED | OUTPATIENT
Start: 2024-07-22

## 2024-08-05 ENCOUNTER — HOSPITAL ENCOUNTER (OUTPATIENT)
Facility: CLINIC | Age: 59
End: 2024-08-05
Attending: INTERNAL MEDICINE | Admitting: INTERNAL MEDICINE
Payer: COMMERCIAL

## 2024-08-05 ENCOUNTER — TELEPHONE (OUTPATIENT)
Dept: GASTROENTEROLOGY | Facility: CLINIC | Age: 59
End: 2024-08-05
Payer: COMMERCIAL

## 2024-08-05 NOTE — TELEPHONE ENCOUNTER
"  Endoscopy Scheduling Screen    Have you had a positive Covid test in the last 14 days?  No    What is your communication preference for Instructions and/or Bowel Prep?   MyChart    What insurance is in the chart?  Other:      Ordering/Referring Provider:     KEVIN DAMICO      (If ordering provider performs procedure, schedule with ordering provider unless otherwise instructed. )    BMI: Estimated body mass index is 29.52 kg/m  as calculated from the following:    Height as of 6/25/24: 1.626 m (5' 4\").    Weight as of 6/25/24: 78 kg (172 lb).     Sedation Ordered  moderate sedation.   If patient BMI > 50 do not schedule in ASC.    If patient BMI > 45 do not schedule at [facility].    Are you taking methadone or Suboxone?  No    Have you had difficulties, pain, or discomfort during past endoscopy procedures?  No    Are you taking any prescription medications for pain 3 or more times per week?   NO, No RN review required.    Do you have a history of malignant hyperthermia?  No    (Females) Are you currently pregnant?        Have you been diagnosed or told you have pulmonary hypertension?   No    Do you have an LVAD?  No    Have you been told you have moderate to severe sleep apnea?  No    Have you been told you have COPD, asthma, or any other lung disease?  No    Do you have any heart conditions?  No     Have you ever had or are you waiting for an organ transplant?  No. Continue scheduling, no site restrictions.    Have you had a stroke or transient ischemic attack (TIA aka \"mini stroke\" in the last 6 months?   No    Have you been diagnosed with or been told you have cirrhosis of the liver?   No    Are you currently on dialysis?   No    Do you need assistance transferring?   No    BMI: Estimated body mass index is 29.52 kg/m  as calculated from the following:    Height as of 6/25/24: 1.626 m (5' 4\").    Weight as of 6/25/24: 78 kg (172 lb).     Is patients BMI > 40 and scheduling location [facility]?  No    Do you take an " injectable medication for weight loss or diabetes (excluding insulin)?  No    Do you take the medication Naltrexone?  No    Do you take blood thinners?  No       Prep   Are you currently on dialysis or do you have chronic kidney disease?  No    Do you have a diagnosis of diabetes?  No    Do you have a diagnosis of cystic fibrosis (CF)?  No    On a regular basis do you go 3 -5 days between bowel movements?  No    BMI > 40?  No    Preferred Pharmacy:    [id number] - [place], [place] - [address]  [address] [place] [address]  Phone: [phone] Fax: [phone]    Final Scheduling Details     Procedure scheduled  Colonoscopy    Surgeon:  KELLY     Date of procedure:  8/13     Pre-OP / PAC:   No - Not required for this site.    Location  [facility] - Per order.    Sedation   Moderate Sedation - Per order.      Patient Reminders:   You will receive a call from a Nurse to review instructions and health history.  This assessment must be completed prior to your procedure.  Failure to complete the Nurse assessment may result in the procedure being cancelled.      On the day of your procedure, please designate an adult(s) who can drive you home stay with you for the next 24 hours. The medicines used in the exam will make you sleepy. You will not be able to drive.      You cannot take public transportation, ride share services, or non-medical taxi service without a responsible caregiver.  Medical transport services are allowed with the requirement that a responsible caregiver will receive you at your destination.  We require that drivers and caregivers are confirmed prior to your procedure.

## 2024-08-06 ENCOUNTER — TELEPHONE (OUTPATIENT)
Dept: GASTROENTEROLOGY | Facility: CLINIC | Age: 59
End: 2024-08-06
Payer: COMMERCIAL

## 2024-08-06 NOTE — TELEPHONE ENCOUNTER
Attempted to contact patient in order to complete pre assessment questions.     Patient answered but it was not a good time. Patient will return call to [phone] option 4    Callback required communication sent via [employer]er[employer]zer.      Antonia Weiss RN  Endoscopy Procedure Pre Assessment

## 2024-08-06 NOTE — TELEPHONE ENCOUNTER
Pre visit planning completed.      Procedure details:    Patient scheduled for Colonoscopy on 08.13.2024.     Arrival time: 1430. Procedure time 1515    Facility location: [facility]; [address] [place] [address]. Check in location: Main entrance, door #1 on the North side of the building under roundabout awning. DO NOT GO TO SURGERY/ED ENTRANCE.     Sedation type: Conscious sedation     Pre op exam needed? No.    Indication for procedure:   Encounter for preventative adult health care examination   Colon cancer screening     Discuss if new symptoms-repeat colonoscopy not due until 2028    Chart review:     Electronic implanted devices? No    Recent diagnosis of diverticulitis within the last 6 weeks? No      Medication review:    Diabetic? No    Anticoagulants? No    Weight loss medication/injectable? No GLP 1 medications per patient's medication list.  RN will verify with pre-assessment call.    NSAIDS? No NSAID medications per patient's medication list.  RN will verify with pre-assessment call.    Other medication HOLDING recommendations:  N/A      Prep for procedure:     Bowel prep recommendation: Standard Miralax  Due to: standard bowel prep.    Prep instructions sent via [employer]ac         Antonia Weiss RN  Endoscopy Procedure Pre Assessment RN  [phone] option 4

## 2024-08-08 NOTE — TELEPHONE ENCOUNTER
Second call attempt to complete pre assessment.     No answer.  Left message to return call to [phone] #4 by next business day prior to 4PM or procedure will be sent to cancel.     Callback required communication sent via [employer].      Yumiko Bronson RN  Endoscopy Procedure Pre Assessment

## 2024-08-12 ENCOUNTER — TELEPHONE (OUTPATIENT)
Dept: GASTROENTEROLOGY | Facility: CLINIC | Age: 59
End: 2024-08-12
Payer: COMMERCIAL

## 2024-08-12 NOTE — TELEPHONE ENCOUNTER
No return call received.   Pre assessment was not completed for upcoming scheduled procedure.     Staff message sent to endoscopy scheduling to cancel procedure per policy.       Yumiko Bronson RN   Endoscopy Procedure Pre Assessment

## 2024-08-12 NOTE — TELEPHONE ENCOUNTER
----- Message from Yumiko TRIPP sent at 8/12/2024  8:57 AM CDT -----  Regarding: Pre-Assessment not complete- please cancel  Pre assessment was not completed for upcoming Colonoscopy scheduled on 8/13/24.    Please cancel per policy.     As a side note- patient is not due for a screening colonoscopy until 2028.      Thank you,     Yumiko Bronson RN  Endoscopy Procedure Pre Assessment RN  [phone] option 4

## 2024-08-12 NOTE — TELEPHONE ENCOUNTER
Caller: No Call Made    Reason for Reschedule/Cancellation   (please be detailed, any staff messages or encounters to note?): Pre-assessment not complete      Prior to reschedule please review:  Ordering Provider: New Castellanos MD  Sedation Determined: Moderate  Does patient have any ASC Exclusions, please identify?: No      Notes on Cancelled Procedure:  Procedure: Lower Endoscopy [Colonoscopy]   Date: 08/13/2024  Location: [facility]; [address] [place] [address]  Surgeon: KELLY      Rescheduled: No, CASE IN DEPOT. Per RN pt not due till 2028       Did you cancel or rescheduled an EUS procedure? No.

## 2024-08-20 ENCOUNTER — MYC REFILL (OUTPATIENT)
Dept: INTERNAL MEDICINE | Facility: CLINIC | Age: 59
End: 2024-08-20
Payer: COMMERCIAL

## 2024-08-20 DIAGNOSIS — F41.1 GAD (GENERALIZED ANXIETY DISORDER): ICD-10-CM

## 2024-08-20 DIAGNOSIS — F98.8 ATTENTION DEFICIT DISORDER, UNSPECIFIED HYPERACTIVITY PRESENCE: ICD-10-CM

## 2024-08-22 DIAGNOSIS — F98.8 ATTENTION DEFICIT DISORDER, UNSPECIFIED HYPERACTIVITY PRESENCE: ICD-10-CM

## 2024-08-22 DIAGNOSIS — F41.1 GAD (GENERALIZED ANXIETY DISORDER): ICD-10-CM

## 2024-08-22 RX ORDER — METHYLPHENIDATE HYDROCHLORIDE 20 MG/1
20 TABLET ORAL 2 TIMES DAILY
Qty: 60 TABLET | Refills: 0 | Status: SHIPPED | OUTPATIENT
Start: 2024-08-22 | End: 2024-09-19

## 2024-08-22 RX ORDER — ALPRAZOLAM 0.5 MG
0.5 TABLET ORAL DAILY PRN
Qty: 15 TABLET | Refills: 0 | Status: SHIPPED | OUTPATIENT
Start: 2024-08-22 | End: 2024-09-19

## 2024-08-22 RX ORDER — ALPRAZOLAM 0.5 MG
0.5 TABLET ORAL DAILY PRN
Qty: 15 TABLET | Refills: 0 | OUTPATIENT
Start: 2024-08-22

## 2024-08-22 RX ORDER — METHYLPHENIDATE HYDROCHLORIDE 20 MG/1
20 TABLET ORAL 2 TIMES DAILY
Qty: 60 TABLET | Refills: 0 | OUTPATIENT
Start: 2024-08-22

## 2024-09-11 ENCOUNTER — MYC REFILL (OUTPATIENT)
Dept: INTERNAL MEDICINE | Facility: CLINIC | Age: 59
End: 2024-09-11
Payer: COMMERCIAL

## 2024-09-11 DIAGNOSIS — G62.9 NEUROPATHY: ICD-10-CM

## 2024-09-11 RX ORDER — GABAPENTIN 600 MG/1
600 TABLET ORAL 3 TIMES DAILY
Qty: 270 TABLET | Refills: 0 | Status: SHIPPED | OUTPATIENT
Start: 2024-09-11

## 2024-09-15 ENCOUNTER — NURSE TRIAGE (OUTPATIENT)
Dept: NURSING | Facility: CLINIC | Age: 59
End: 2024-09-15
Payer: COMMERCIAL

## 2024-09-15 ENCOUNTER — VIRTUAL VISIT (OUTPATIENT)
Dept: URGENT CARE | Facility: CLINIC | Age: 59
End: 2024-09-15
Payer: COMMERCIAL

## 2024-09-15 DIAGNOSIS — J34.9 SINUS PROBLEM: Primary | ICD-10-CM

## 2024-09-15 PROCEDURE — 99213 OFFICE O/P EST LOW 20 MIN: CPT | Mod: 95

## 2024-09-15 RX ORDER — AZITHROMYCIN 250 MG/1
TABLET, FILM COATED ORAL
Qty: 6 TABLET | Refills: 0 | Status: SHIPPED | OUTPATIENT
Start: 2024-09-15 | End: 2024-09-20

## 2024-09-15 NOTE — PROGRESS NOTES
"  Jayleen Lang is a 59 year old female who is being evaluated via a billable video visit.      The patient has been notified of following at the time of scheduling video visit:     \"This video visit will be conducted via a video call between you and your physician/provider. We have found that certain health care needs can be provided without the need for a physical exam.  This service lets us provide the care you need with a video conversation.  If a prescription is necessary we can send it directly to your pharmacy.  If lab work is needed we can place an order for that and you can then stop by our lab to have the test done at a later time.\"   Patient has given consent for video visit?  YES  changed to telephone visit as pt unable to get on video when tries.    SUBJECTIVE:  Jayleen Lang is an 59 year old female who presents for sinus problem.  Often gets sinus infection about once a year.  Has a few days of sinus pain, face pain, and upper teeth pain.  Has headache.  Temp 99 this morning.  Some sweats, no chills.  No n/v/d.  No recent travel.  Took some tyelnol but didn't help.  No hx of sinus surgery.  Usually zpack works best for her.  Has had GI side effects from augmentin.    PMH:   has a past medical history of ADD (attention deficit disorder), Hypertension, and Obesity.  Patient Active Problem List   Diagnosis    Rash    Menorrhagia    Obesity    REBEKAH (generalized anxiety disorder)    Essential hypertension, benign    Allergic reaction    Hyperlipidemia LDL goal <130    Chronic neck pain    Controlled substance agreement signed    Attention deficit disorder, unspecified hyperactivity presence     Social History     Socioeconomic History    Marital status:     Number of children: 2   Occupational History     Employer: [employer]   Tobacco Use    Smoking status: Never    Smokeless tobacco: Never   Vaping Use    Vaping status: Never Used   Substance and Sexual Activity    Alcohol use: Yes     " Comment: 2 -3  beers a week    Drug use: No    Sexual activity: Yes     Partners: Male     Birth control/protection: Surgical     Comment:  vasectomy   Other Topics Concern    Parent/sibling w/ CABG, MI or angioplasty before 65F 55M? Yes     Comment: father MI at 48     Social Determinants of Health     Financial Resource Strain: Unknown (6/25/2024)    Financial Resource Strain     Within the past 12 months, have you or your family members you live with been unable to get utilities (heat, electricity) when it was really needed?: Patient declined   Food Insecurity: Low Risk  (6/25/2024)    Food Insecurity     Within the past 12 months, did you worry that your food would run out before you got money to buy more?: No     Within the past 12 months, did the food you bought just not last and you didn t have money to get more?: No   Transportation Needs: Unknown (6/25/2024)    Transportation Needs     Within the past 12 months, has lack of transportation kept you from medical appointments, getting your medicines, non-medical meetings or appointments, work, or from getting things that you need?: Patient declined   Physical Activity: Sufficiently Active (6/25/2024)    Exercise Vital Sign     Days of Exercise per Week: 4 days     Minutes of Exercise per Session: 40 min   Stress: Stress Concern Present (6/25/2024)    [place] [employer] - Occupational Stress Questionnaire     Feeling of Stress : To some extent   Social Connections: Unknown (6/25/2024)    Social Connection and Isolation Panel [NHANES]     Frequency of Social Gatherings with Friends and Family: Three times a week   Interpersonal Safety: Low Risk  (6/25/2024)    Interpersonal Safety     Do you feel physically and emotionally safe where you currently live?: Yes     Within the past 12 months, have you been hit, slapped, kicked or otherwise physically hurt by someone?: No     Within the past 12 months, have you been humiliated or  emotionally abused in other ways by your partner or ex-partner?: No   Housing Stability: Unknown (6/25/2024)    Housing Stability     Do you have housing? : Patient declined     Are you worried about losing your housing?: Patient declined     Family History   Problem Relation Age of Onset    Cancer Mother         lung    Heart Disease Father         congestive heart failure    Colon Cancer No family hx of        ALLERGIES:  Bee venom and Sulfa antibiotics    Current Outpatient Medications   Medication Sig Dispense Refill    ALPRAZolam (XANAX) 0.5 MG tablet Take 1 tablet (0.5 mg) by mouth daily as needed for anxiety. 15 tablet 0    cetirizine (ZYRTEC) 10 MG tablet TAKE ONE TABLET BY MOUTH EVERY EVENING (Patient not taking: Reported on 5/1/2023) 90 tablet 2    EPINEPHrine (ANY BX GENERIC EQUIV) 0.3 MG/0.3ML injection 2-pack INJECT 0.3 MILLILITERS INTRAMUSCULARLY ONCE AS NEEDED FOR ANAPHYLAXIS 2 each 1    gabapentin (NEURONTIN) 600 MG tablet Take 1 tablet (600 mg) by mouth 3 times daily. 270 tablet 0    lisinopril (ZESTRIL) 20 MG tablet Take 1 tablet (20 mg) by mouth daily 90 tablet 1    methylphenidate (RITALIN) 20 MG tablet Take 1 tablet (20 mg) by mouth 2 times daily. 60 tablet 0     No current facility-administered medications for this visit.         ROS:  ROS is done and is negative for general/constitutional, eye, ENT, Respiratory, cardiovascular, GI, , Skin, musculoskeletal except as noted elsewhere.  All other review of systems negative except as noted elsewhere.      OBJECTIVE:    No vital signs obtained as is virtual visit    GENERAL: alert and no distress  NOSE: sounds congested  RESP: No audible wheeze, cough.  PSYCH: Appropriate affect, tone, and pace of words       ASSESSMENT/PLAN:    ASSESSMENT / PLAN:  (J34.9) Sinus problem  (primary encounter diagnosis)  Comment: given severity of sxs and pt's hx of sinus infections and past hx of what abx works best for her, will start zmax  Plan: azithromycin  (ZITHROMAX) 250 MG tablet        Reviewed medication instructions and side effects. Follow up if experiences side effects.. I reviewed supportive care, otc meds to use if needed, expected course, and signs of concern.  Follow up as needed or if does not improve within 5 day(s) or if worsens in any way.  Reviewed red flag symptoms and is to go to the ER if experiences any of these.        See [facility] for orders, medications, letters, patient instructions    Nia Campos MD  9/15/2024, 8:23 AM    Video-Visit Details    Telephone visit - length of call - 17 minutes  Originating Location (pt. Location): Home    Distant Location (provider location):  [facility]

## 2024-09-15 NOTE — TELEPHONE ENCOUNTER
Nurse Triage SBAR    Is this a 2nd Level Triage? NO    Situation: Patient calling    Background: sinus infection    Assessment: Pt calling to see if she would be able to see a provider for VV for sinus infection. She stated she gets a sinus infection once a year and her pcp prescribes a Zpak.  She declines triage. Transferred to  to see if an [facility] VV is available today.              Does the patient meet one of the following criteria for ADS visit consideration? 16+ years old, with an [facility] PCP     TIP  Providers, please consider if this condition is appropriate for management at one of our Acute and Diagnostic Services sites.     If patient is a good candidate, please use dotphrase <dot>triageresponse and select Refer to ADS to document.    Reason for Disposition    Health Information question, no triage required and triager able to answer question    Protocols used: Information Only Call - [facility]

## 2024-09-19 ENCOUNTER — MYC REFILL (OUTPATIENT)
Dept: INTERNAL MEDICINE | Facility: CLINIC | Age: 59
End: 2024-09-19
Payer: COMMERCIAL

## 2024-09-19 DIAGNOSIS — F41.1 GAD (GENERALIZED ANXIETY DISORDER): ICD-10-CM

## 2024-09-19 DIAGNOSIS — F98.8 ATTENTION DEFICIT DISORDER, UNSPECIFIED HYPERACTIVITY PRESENCE: ICD-10-CM

## 2024-09-19 RX ORDER — METHYLPHENIDATE HYDROCHLORIDE 20 MG/1
20 TABLET ORAL 2 TIMES DAILY
Qty: 60 TABLET | Refills: 0 | Status: SHIPPED | OUTPATIENT
Start: 2024-09-19

## 2024-09-19 RX ORDER — ALPRAZOLAM 0.5 MG
0.5 TABLET ORAL DAILY PRN
Qty: 15 TABLET | Refills: 0 | Status: SHIPPED | OUTPATIENT
Start: 2024-09-19

## 2024-10-16 DIAGNOSIS — I10 ESSENTIAL HYPERTENSION: ICD-10-CM

## 2024-10-16 RX ORDER — LISINOPRIL 20 MG/1
20 TABLET ORAL DAILY
Qty: 90 TABLET | Refills: 1 | Status: SHIPPED | OUTPATIENT
Start: 2024-10-16

## 2024-10-19 ENCOUNTER — MYC REFILL (OUTPATIENT)
Dept: INTERNAL MEDICINE | Facility: CLINIC | Age: 59
End: 2024-10-19
Payer: COMMERCIAL

## 2024-10-19 DIAGNOSIS — F90.2 ATTENTION DEFICIT HYPERACTIVITY DISORDER (ADHD), COMBINED TYPE: ICD-10-CM

## 2024-10-19 DIAGNOSIS — F41.1 GAD (GENERALIZED ANXIETY DISORDER): Primary | ICD-10-CM

## 2024-10-19 DIAGNOSIS — F98.8 ATTENTION DEFICIT DISORDER, UNSPECIFIED HYPERACTIVITY PRESENCE: ICD-10-CM

## 2024-10-19 RX ORDER — METHYLPHENIDATE HYDROCHLORIDE 20 MG/1
20 TABLET ORAL 2 TIMES DAILY
Qty: 60 TABLET | Refills: 0 | Status: CANCELLED | OUTPATIENT
Start: 2024-10-19

## 2024-10-19 RX ORDER — ALPRAZOLAM 0.5 MG
0.5 TABLET ORAL DAILY PRN
Qty: 15 TABLET | Refills: 0 | Status: CANCELLED | OUTPATIENT
Start: 2024-10-19

## 2024-10-22 DIAGNOSIS — F41.1 GAD (GENERALIZED ANXIETY DISORDER): ICD-10-CM

## 2024-10-22 DIAGNOSIS — F98.8 ATTENTION DEFICIT DISORDER: ICD-10-CM

## 2024-10-24 RX ORDER — METHYLPHENIDATE HYDROCHLORIDE 20 MG/1
20 TABLET ORAL 2 TIMES DAILY
Qty: 60 TABLET | Refills: 0 | OUTPATIENT
Start: 2024-10-24

## 2024-10-24 RX ORDER — METHYLPHENIDATE HYDROCHLORIDE 20 MG/1
20 TABLET ORAL 2 TIMES DAILY
Qty: 60 TABLET | Refills: 0 | Status: CANCELLED | OUTPATIENT
Start: 2024-10-24

## 2024-10-24 RX ORDER — ALPRAZOLAM 0.5 MG
0.5 TABLET ORAL DAILY PRN
Qty: 15 TABLET | Refills: 0 | OUTPATIENT
Start: 2024-10-24

## 2024-10-24 RX ORDER — ALPRAZOLAM 0.5 MG
0.5 TABLET ORAL DAILY PRN
Qty: 15 TABLET | Refills: 0 | Status: CANCELLED | OUTPATIENT
Start: 2024-10-24

## 2024-10-25 DIAGNOSIS — F90.2 ATTENTION DEFICIT HYPERACTIVITY DISORDER (ADHD), COMBINED TYPE: Primary | ICD-10-CM

## 2024-10-25 DIAGNOSIS — F41.1 GAD (GENERALIZED ANXIETY DISORDER): ICD-10-CM

## 2024-10-25 RX ORDER — METHYLPHENIDATE HYDROCHLORIDE 20 MG/1
20 TABLET ORAL 2 TIMES DAILY
Qty: 60 TABLET | Refills: 0 | Status: SHIPPED | OUTPATIENT
Start: 2024-10-25

## 2024-10-25 RX ORDER — ALPRAZOLAM 0.5 MG
0.5 TABLET ORAL DAILY PRN
Qty: 15 TABLET | Refills: 0 | Status: SHIPPED | OUTPATIENT
Start: 2024-10-25

## 2024-10-25 NOTE — TELEPHONE ENCOUNTER
Patient calling in to check on the status of this request. Patient has been out of medication for about a week.

## 2024-10-25 NOTE — TELEPHONE ENCOUNTER
Patient requesting refills, she leaves for out of town today. Please review and approve if appropriate.     Pending Prescriptions:                       Disp   Refills    methylphenidate (RITALIN) 20 MG tablet    60 tab*0            Sig: Take 1 tablet (20 mg) by mouth 2 times daily.    ALPRAZolam (XANAX) 0.5 MG tablet          15 tab*0            Sig: Take 1 tablet (0.5 mg) by mouth daily as needed           for anxiety.          Last Written Prescription Date:  9/19/24  Last Office Visit: 6/25/24  Future Office visit:     Appointments in Next Year      Jan 06, 2025 4:30 PM  (Arrive by 4:10 PM)  Provider Visit with New Castellanos MD  [facility] ([facility] ) [phone]             Routing refill request to provider for review/approval because:  Drug not on the [facility], [facility] or [facility] refill protocol or controlled substance    Summer RN 7:43 AM October 25, 2024   [facility]

## 2024-10-25 NOTE — TELEPHONE ENCOUNTER
Patient requesting refills, she leaves for out of town today. Please review and approve if appropriate.     See alternative phone encounter.    Summer RN 7:46 AM October 25, 2024   [facility]

## 2024-12-03 ENCOUNTER — MYC REFILL (OUTPATIENT)
Dept: INTERNAL MEDICINE | Facility: CLINIC | Age: 59
End: 2024-12-03
Payer: COMMERCIAL

## 2024-12-03 DIAGNOSIS — I10 ESSENTIAL HYPERTENSION: ICD-10-CM

## 2024-12-04 ENCOUNTER — TELEPHONE (OUTPATIENT)
Dept: INTERNAL MEDICINE | Facility: CLINIC | Age: 59
End: 2024-12-04
Payer: COMMERCIAL

## 2024-12-04 DIAGNOSIS — I10 ESSENTIAL HYPERTENSION: ICD-10-CM

## 2024-12-04 RX ORDER — LISINOPRIL 20 MG/1
20 TABLET ORAL DAILY
Qty: 90 TABLET | Refills: 1 | OUTPATIENT
Start: 2024-12-04

## 2024-12-04 RX ORDER — LISINOPRIL 20 MG/1
20 TABLET ORAL DAILY
Qty: 90 TABLET | Refills: 1 | Status: SHIPPED | OUTPATIENT
Start: 2024-12-04

## 2024-12-04 NOTE — TELEPHONE ENCOUNTER
Pt states pharmacy did not receive lisinopril     Called and confirmed it was not received    Resent and asked to get ready for pt      Laura Manuel RN

## 2024-12-16 ENCOUNTER — MYC REFILL (OUTPATIENT)
Dept: INTERNAL MEDICINE | Facility: CLINIC | Age: 59
End: 2024-12-16
Payer: COMMERCIAL

## 2024-12-16 DIAGNOSIS — J30.2 CHRONIC SEASONAL ALLERGIC RHINITIS: ICD-10-CM

## 2024-12-16 DIAGNOSIS — F41.1 GAD (GENERALIZED ANXIETY DISORDER): ICD-10-CM

## 2024-12-16 DIAGNOSIS — F90.2 ATTENTION DEFICIT HYPERACTIVITY DISORDER (ADHD), COMBINED TYPE: ICD-10-CM

## 2024-12-16 NOTE — TELEPHONE ENCOUNTER
Clinic RN: Please investigate patient's chart or contact patient if the information cannot be found because  Zyrtec last filled in 2020. Please verify if pt needs refill and how taking. Other Rx need to go to provider for review.    Sheila SAMANIEGO, RN, PHN

## 2024-12-16 NOTE — TELEPHONE ENCOUNTER
"Call to patient who says she is taking Zyrtec daily due to cold symptoms she has had for about a month. Patient declines further triage or appointment because \"I'm not sick, I just have a cold\".    Routing to primary care provider.     Thank you,  Dae, Triage RN Lisa Márquez    2:03 PM 12/16/2024       "

## 2024-12-18 RX ORDER — METHYLPHENIDATE HYDROCHLORIDE 20 MG/1
20 TABLET ORAL 2 TIMES DAILY
Qty: 60 TABLET | Refills: 0 | Status: SHIPPED | OUTPATIENT
Start: 2024-12-18

## 2024-12-18 RX ORDER — CETIRIZINE HYDROCHLORIDE 10 MG/1
10 TABLET ORAL EVERY MORNING
Qty: 90 TABLET | Refills: 2 | Status: SHIPPED | OUTPATIENT
Start: 2024-12-18

## 2024-12-18 RX ORDER — ALPRAZOLAM 0.5 MG
0.5 TABLET ORAL DAILY PRN
Qty: 15 TABLET | Refills: 0 | Status: SHIPPED | OUTPATIENT
Start: 2024-12-18

## 2025-01-06 ENCOUNTER — OFFICE VISIT (OUTPATIENT)
Dept: INTERNAL MEDICINE | Facility: CLINIC | Age: 60
End: 2025-01-06
Attending: INTERNAL MEDICINE
Payer: COMMERCIAL

## 2025-01-06 VITALS
HEART RATE: 111 BPM | WEIGHT: 166.3 LBS | SYSTOLIC BLOOD PRESSURE: 136 MMHG | OXYGEN SATURATION: 97 % | TEMPERATURE: 98 F | BODY MASS INDEX: 28.39 KG/M2 | HEIGHT: 64 IN | RESPIRATION RATE: 14 BRPM | DIASTOLIC BLOOD PRESSURE: 84 MMHG

## 2025-01-06 DIAGNOSIS — M54.2 CHRONIC NECK PAIN: Primary | ICD-10-CM

## 2025-01-06 DIAGNOSIS — F90.2 ATTENTION DEFICIT HYPERACTIVITY DISORDER (ADHD), COMBINED TYPE: ICD-10-CM

## 2025-01-06 DIAGNOSIS — F41.1 GAD (GENERALIZED ANXIETY DISORDER): ICD-10-CM

## 2025-01-06 DIAGNOSIS — I10 ESSENTIAL HYPERTENSION, BENIGN: ICD-10-CM

## 2025-01-06 DIAGNOSIS — G89.29 CHRONIC NECK PAIN: Primary | ICD-10-CM

## 2025-01-06 PROCEDURE — 99214 OFFICE O/P EST MOD 30 MIN: CPT | Performed by: INTERNAL MEDICINE

## 2025-01-06 ASSESSMENT — PAIN SCALES - GENERAL: PAINLEVEL_OUTOF10: NO PAIN (0)

## 2025-01-06 NOTE — PROGRESS NOTES
"  Assessment & Plan     Chronic neck pain  Controlled  On PRN Gabapentin     Essential hypertension, benign  Controlled on treatment, monitor  Keep low salt diet     REBEKAH (generalized anxiety disorder)  On PRN Alprazolam, controlled     Attention deficit hyperactivity disorder (ADHD), combined type  On Ritalin, helps with symptoms. No side effects           BMI  Estimated body mass index is 28.55 kg/m  as calculated from the following:    Height as of this encounter: 1.626 m (5' 4\").    Weight as of this encounter: 75.4 kg (166 lb 4.8 oz).         See Patient Instructions    Subjective   Jayleen is a 59 year old, presenting for the following health issues:  Recheck Medication        1/6/2025     4:24 PM   Additional Questions   Roomed by Viki PLASCENCIA   Accompanied by n/a     History of Present Illness       Reason for visit:  Ed   She is taking medications regularly.         Hypertension Follow-up    Do you check your blood pressure regularly outside of the clinic? No   Are you following a low salt diet? Yes  Are your blood pressures ever more than 140 on the top number (systolic) OR more   than 90 on the bottom number (diastolic), for example 140/90? N/A  How many servings of fruits and vegetables do you eat daily?  2-3  On average, how many sweetened beverages do you drink each day (Examples: soda, juice, sweet tea, etc.  Do NOT count diet or artificially sweetened beverages)?   0  How many days per week do you exercise enough to make your heart beat faster? 4  How many minutes a day do you exercise enough to make your heart beat faster? 20 - 29  How many days per week do you miss taking your medication? 1  What makes it hard for you to take your medications?  remembering to take      Patient is seen for a follow up visit.  Has h/o HTN. on medical treatment. BP has been controlled. No side effects from medications. No CP, HA, dizziness. good compliance with medications and low salt diet.  Has h/o anxiety, controlled on PRN " "anxiolytic.   Has ADHD, on treatment. No side effects.       Review of Systems  Constitutional, HEENT, cardiovascular, pulmonary, gi and gu systems are negative, except as otherwise noted.      Objective    BP (!) 143/96 (BP Location: Left arm, Cuff Size: Adult Regular)   Pulse 111   Temp 98  F (36.7  C) (Tympanic)   Resp 14   Ht 1.626 m (5' 4\")   Wt 75.4 kg (166 lb 4.8 oz)   LMP  (LMP Unknown)   SpO2 97%   BMI 28.55 kg/m    Body mass index is 28.55 kg/m .  Physical Exam   GENERAL: alert and no distress  NECK: no adenopathy, no asymmetry, masses, or scars  RESP: lungs clear to auscultation - no rales, rhonchi or wheezes  CV: regular rate and rhythm, normal S1 S2, no S3 or S4, no murmur, click or rub, no peripheral edema  ABDOMEN: soft, nontender, no hepatosplenomegaly, no masses and bowel sounds normal  MS: no gross musculoskeletal defects noted, no edema    Office Visit on 06/25/2024   Component Date Value Ref Range Status    Gabapentin (Neurontin) 06/25/2024 Present (A)  Absent Final    Sources of gabapentin are prescription medications.    Methylphenidate (Ritalin) ng/mL 06/25/2024 283 (H)  <50 ng/mL Final    Methylphenidate (Ritalin) 06/25/2024 250  Absent ng/mg [creat] Final    Sources of methylphenidate are scheduled prescription medications.    Ritalinic Acid ng/mL 06/25/2024 >13,000 (H)  <50 ng/mL Final    Ritalinic Acid 06/25/2024    Final    Unable to calculate: Result value above analytical measurement range    Creatinine Urine for Drug Screen 06/25/2024 113  mg/dL Final    The reference range has not been established for creatinine in random urines. The results should be integrated into the clinical context for interpretation.           Signed Electronically by: New Castellanos MD    "

## 2025-01-12 ENCOUNTER — MYC REFILL (OUTPATIENT)
Dept: INTERNAL MEDICINE | Facility: CLINIC | Age: 60
End: 2025-01-12
Payer: COMMERCIAL

## 2025-01-12 DIAGNOSIS — F41.1 GAD (GENERALIZED ANXIETY DISORDER): ICD-10-CM

## 2025-01-12 DIAGNOSIS — F90.2 ATTENTION DEFICIT HYPERACTIVITY DISORDER (ADHD), COMBINED TYPE: ICD-10-CM

## 2025-01-15 NOTE — TELEPHONE ENCOUNTER
Patient informed of primary care provider's message below via [employer].    This encounter routed back to primary care provider to refill 1/17/25.

## 2025-01-16 RX ORDER — METHYLPHENIDATE HYDROCHLORIDE 20 MG/1
20 TABLET ORAL 2 TIMES DAILY
Qty: 60 TABLET | Refills: 0 | Status: SHIPPED | OUTPATIENT
Start: 2025-01-16

## 2025-01-16 RX ORDER — ALPRAZOLAM 0.5 MG
0.5 TABLET ORAL DAILY PRN
Qty: 15 TABLET | Refills: 0 | Status: SHIPPED | OUTPATIENT
Start: 2025-01-16

## 2025-01-16 NOTE — TELEPHONE ENCOUNTER
Patient calls to follow up on this. Relayed to patient that both medications were sent today by primary care provider. Patient states pharmacy didn't receive them. Writer tells patient that writer will call pharmacy and call patient back to verify pharmacy received prescription's.    Call to pharmacy. Pharmacy states they see the prescription and received them about 5 minutes ago. They will work on getting them filled for patient.    Call to patient to notify her. Patient verbalizes understanding of instructions and indicates no further questions at this time.    Thank you,  Dae, Triage KVNG Márquez   2:36 PM 1/16/2025

## 2025-02-03 ENCOUNTER — ANCILLARY PROCEDURE (OUTPATIENT)
Dept: MAMMOGRAPHY | Facility: CLINIC | Age: 60
End: 2025-02-03
Attending: INTERNAL MEDICINE
Payer: COMMERCIAL

## 2025-02-03 DIAGNOSIS — Z12.31 ENCOUNTER FOR SCREENING MAMMOGRAM FOR BREAST CANCER: ICD-10-CM

## 2025-02-03 DIAGNOSIS — Z00.00 ENCOUNTER FOR PREVENTATIVE ADULT HEALTH CARE EXAMINATION: ICD-10-CM

## 2025-02-03 PROCEDURE — 77067 SCR MAMMO BI INCL CAD: CPT | Mod: TC | Performed by: RADIOLOGY

## 2025-02-03 PROCEDURE — 77063 BREAST TOMOSYNTHESIS BI: CPT | Mod: TC | Performed by: RADIOLOGY

## 2025-02-10 ENCOUNTER — OFFICE VISIT (OUTPATIENT)
Dept: INTERNAL MEDICINE | Facility: CLINIC | Age: 60
End: 2025-02-10
Payer: COMMERCIAL

## 2025-02-10 VITALS
WEIGHT: 166 LBS | BODY MASS INDEX: 28.34 KG/M2 | DIASTOLIC BLOOD PRESSURE: 80 MMHG | TEMPERATURE: 97.8 F | HEART RATE: 102 BPM | OXYGEN SATURATION: 98 % | SYSTOLIC BLOOD PRESSURE: 130 MMHG | HEIGHT: 64 IN | RESPIRATION RATE: 20 BRPM

## 2025-02-10 DIAGNOSIS — R87.610 PAPANICOLAOU SMEAR OF CERVIX WITH ATYPICAL SQUAMOUS CELLS OF UNDETERMINED SIGNIFICANCE (ASC-US): ICD-10-CM

## 2025-02-10 DIAGNOSIS — Z11.51 SCREENING FOR HUMAN PAPILLOMAVIRUS: Primary | ICD-10-CM

## 2025-02-10 PROCEDURE — 87624 HPV HI-RISK TYP POOLED RSLT: CPT

## 2025-02-10 PROCEDURE — 99213 OFFICE O/P EST LOW 20 MIN: CPT

## 2025-02-10 NOTE — PROGRESS NOTES
Assessment & Plan     Screening for human papillomavirus  - HPV High Risk Types DNA Cervical    Papanicolaou smear of cervix with atypical squamous cells of undetermined significance (ASC-US)  - HPV High Risk Types DNA Cervical                Subjective   Jayleen is a 59 year old, presenting for the following health issues: Patient presented for Pap smear.  Patient was wanting Pap smear because she had a relative who was diagnosed with ovarian cancer.  Gyn Exam      2/10/2025     1:17 PM   Additional Questions   Roomed by DANIEL Lam LPN   Accompanied by self         2/10/2025     1:17 PM   Patient Reported Additional Medications   Patient reports taking the following new medications none     History of Present Illness       Reason for visit:  Papsmear She is missing 7 dose(s) of medications per week.                 Review of Systems  Constitutional, HEENT, cardiovascular, pulmonary, gi and gu systems are negative, except as otherwise noted.      Objective    LMP 02/10/2015   Breastfeeding No   There is no height or weight on file to calculate BMI.  Physical Exam   GENERAL: alert and no distress  NECK: no adenopathy, no asymmetry, masses, or scars  RESP: lungs clear to auscultation - no rales, rhonchi or wheezes  CV: regular rate and rhythm, normal S1 S2, no S3 or S4, no murmur, click or rub, no peripheral edema  ABDOMEN: soft, nontender, no hepatosplenomegaly, no masses and bowel sounds normal   (female): normal female external genitalia, normal urethral meatus , normal vaginal mucosa, vaginal mucosa pink, moist, well rugated, vaginal discharge - clear and white, and normal cervix, adnexae, and uterus without masses.  MS: no gross musculoskeletal defects noted, no edema            Signed Electronically by: SPENCER Obregon CNP

## 2025-02-11 LAB
HPV HR 12 DNA CVX QL NAA+PROBE: NEGATIVE
HPV16 DNA CVX QL NAA+PROBE: NEGATIVE
HPV18 DNA CVX QL NAA+PROBE: NEGATIVE
HUMAN PAPILLOMA VIRUS FINAL DIAGNOSIS: NORMAL

## 2025-02-17 LAB
BKR LAB AP GYN ADEQUACY: NORMAL
BKR LAB AP GYN INTERPRETATION: NORMAL
BKR LAB AP HPV REFLEX: NO
BKR LAB AP LMP: NORMAL
BKR LAB AP PREVIOUS ABNORMAL: NORMAL
PATH REPORT.COMMENTS IMP SPEC: NORMAL
PATH REPORT.COMMENTS IMP SPEC: NORMAL
PATH REPORT.RELEVANT HX SPEC: NORMAL

## 2025-04-08 ENCOUNTER — MYC REFILL (OUTPATIENT)
Dept: INTERNAL MEDICINE | Facility: CLINIC | Age: 60
End: 2025-04-08
Payer: COMMERCIAL

## 2025-04-08 DIAGNOSIS — I10 ESSENTIAL HYPERTENSION: ICD-10-CM

## 2025-04-08 RX ORDER — LISINOPRIL 20 MG/1
20 TABLET ORAL DAILY
Qty: 90 TABLET | Refills: 0 | Status: SHIPPED | OUTPATIENT
Start: 2025-04-08 | End: 2025-04-10

## 2025-04-09 ENCOUNTER — MYC REFILL (OUTPATIENT)
Dept: INTERNAL MEDICINE | Facility: CLINIC | Age: 60
End: 2025-04-09
Payer: COMMERCIAL

## 2025-04-09 DIAGNOSIS — F90.2 ATTENTION DEFICIT HYPERACTIVITY DISORDER (ADHD), COMBINED TYPE: ICD-10-CM

## 2025-04-09 DIAGNOSIS — F41.1 GAD (GENERALIZED ANXIETY DISORDER): ICD-10-CM

## 2025-04-10 DIAGNOSIS — I10 ESSENTIAL HYPERTENSION: ICD-10-CM

## 2025-04-10 RX ORDER — LISINOPRIL 20 MG/1
20 TABLET ORAL DAILY
Qty: 90 TABLET | Refills: 0 | Status: SHIPPED | OUTPATIENT
Start: 2025-04-10

## 2025-04-10 RX ORDER — ALPRAZOLAM 0.5 MG
0.5 TABLET ORAL DAILY PRN
Qty: 15 TABLET | Refills: 0 | Status: SHIPPED | OUTPATIENT
Start: 2025-04-10

## 2025-04-10 RX ORDER — METHYLPHENIDATE HYDROCHLORIDE 20 MG/1
20 TABLET ORAL 2 TIMES DAILY
Qty: 60 TABLET | Refills: 0 | Status: SHIPPED | OUTPATIENT
Start: 2025-04-10

## 2025-04-14 DIAGNOSIS — I10 ESSENTIAL HYPERTENSION: ICD-10-CM

## 2025-04-14 RX ORDER — LISINOPRIL 20 MG/1
20 TABLET ORAL DAILY
Qty: 90 TABLET | Refills: 3 | Status: SHIPPED | OUTPATIENT
Start: 2025-04-14

## 2025-05-11 ENCOUNTER — MYC REFILL (OUTPATIENT)
Dept: INTERNAL MEDICINE | Facility: CLINIC | Age: 60
End: 2025-05-11
Payer: COMMERCIAL

## 2025-05-11 DIAGNOSIS — F41.1 GAD (GENERALIZED ANXIETY DISORDER): ICD-10-CM

## 2025-05-11 DIAGNOSIS — F90.2 ATTENTION DEFICIT HYPERACTIVITY DISORDER (ADHD), COMBINED TYPE: ICD-10-CM

## 2025-05-12 RX ORDER — ALPRAZOLAM 0.5 MG
0.5 TABLET ORAL DAILY PRN
Qty: 15 TABLET | Refills: 0 | Status: SHIPPED | OUTPATIENT
Start: 2025-05-12

## 2025-05-12 RX ORDER — METHYLPHENIDATE HYDROCHLORIDE 20 MG/1
20 TABLET ORAL 2 TIMES DAILY
Qty: 60 TABLET | Refills: 0 | Status: SHIPPED | OUTPATIENT
Start: 2025-05-12

## 2025-05-26 ENCOUNTER — PATIENT OUTREACH (OUTPATIENT)
Dept: CARE COORDINATION | Facility: CLINIC | Age: 60
End: 2025-05-26
Payer: COMMERCIAL

## 2025-06-03 DIAGNOSIS — G62.9 NEUROPATHY: ICD-10-CM

## 2025-06-03 RX ORDER — GABAPENTIN 600 MG/1
600 TABLET ORAL 3 TIMES DAILY
Qty: 270 TABLET | Refills: 0 | Status: SHIPPED | OUTPATIENT
Start: 2025-06-03

## 2025-06-07 ENCOUNTER — MYC REFILL (OUTPATIENT)
Dept: INTERNAL MEDICINE | Facility: CLINIC | Age: 60
End: 2025-06-07
Payer: COMMERCIAL

## 2025-06-07 DIAGNOSIS — F41.1 GAD (GENERALIZED ANXIETY DISORDER): ICD-10-CM

## 2025-06-07 DIAGNOSIS — F90.2 ATTENTION DEFICIT HYPERACTIVITY DISORDER (ADHD), COMBINED TYPE: ICD-10-CM

## 2025-06-09 ENCOUNTER — PATIENT OUTREACH (OUTPATIENT)
Dept: CARE COORDINATION | Facility: CLINIC | Age: 60
End: 2025-06-09
Payer: COMMERCIAL

## 2025-06-09 RX ORDER — ALPRAZOLAM 0.5 MG
0.5 TABLET ORAL DAILY PRN
Qty: 15 TABLET | Refills: 0 | Status: SHIPPED | OUTPATIENT
Start: 2025-06-09 | End: 2025-06-11

## 2025-06-09 RX ORDER — METHYLPHENIDATE HYDROCHLORIDE 20 MG/1
20 TABLET ORAL 2 TIMES DAILY
Qty: 60 TABLET | Refills: 0 | Status: SHIPPED | OUTPATIENT
Start: 2025-06-09 | End: 2025-06-11

## 2025-06-10 DIAGNOSIS — F90.2 ATTENTION DEFICIT HYPERACTIVITY DISORDER (ADHD), COMBINED TYPE: ICD-10-CM

## 2025-06-10 DIAGNOSIS — F41.1 GAD (GENERALIZED ANXIETY DISORDER): ICD-10-CM

## 2025-06-10 RX ORDER — ALPRAZOLAM 0.5 MG
0.5 TABLET ORAL DAILY PRN
Qty: 15 TABLET | Refills: 0 | OUTPATIENT
Start: 2025-06-10

## 2025-06-10 RX ORDER — METHYLPHENIDATE HYDROCHLORIDE 20 MG/1
20 TABLET ORAL 2 TIMES DAILY
Qty: 60 TABLET | Refills: 0 | OUTPATIENT
Start: 2025-06-10

## 2025-06-10 NOTE — TELEPHONE ENCOUNTER
Transmissions had been failing to get through to [facility] yesterday, patient's Alprazolam and Methylphenidate failed.  Patient would like these 2 rxs sent now to [facility] on [address]  She is anxious to get them filled as she states she is going out of town tomorrow so needs them today.  MISBAH Flor R.N.

## 2025-06-11 RX ORDER — ALPRAZOLAM 0.5 MG
0.5 TABLET ORAL DAILY PRN
Qty: 15 TABLET | Refills: 0 | Status: SHIPPED | OUTPATIENT
Start: 2025-06-11

## 2025-06-11 RX ORDER — METHYLPHENIDATE HYDROCHLORIDE 20 MG/1
20 TABLET ORAL 2 TIMES DAILY
Qty: 60 TABLET | Refills: 0 | Status: SHIPPED | OUTPATIENT
Start: 2025-06-11
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
## 2025-06-12 NOTE — TELEPHONE ENCOUNTER
Called patient and her medication did go through and they are picking it up today.  Will close this encounter.

## 2025-07-07 ENCOUNTER — MYC REFILL (OUTPATIENT)
Dept: INTERNAL MEDICINE | Facility: CLINIC | Age: 60
End: 2025-07-07
Payer: COMMERCIAL

## 2025-07-07 DIAGNOSIS — F90.2 ATTENTION DEFICIT HYPERACTIVITY DISORDER (ADHD), COMBINED TYPE: ICD-10-CM

## 2025-07-07 DIAGNOSIS — F41.1 GAD (GENERALIZED ANXIETY DISORDER): ICD-10-CM

## 2025-07-07 RX ORDER — ALPRAZOLAM 0.5 MG
0.5 TABLET ORAL DAILY PRN
Qty: 15 TABLET | Refills: 0 | Status: SHIPPED | OUTPATIENT
Start: 2025-07-09

## 2025-07-07 RX ORDER — METHYLPHENIDATE HYDROCHLORIDE 20 MG/1
20 TABLET ORAL 2 TIMES DAILY
Qty: 60 TABLET | Refills: 0 | Status: SHIPPED | OUTPATIENT
Start: 2025-07-09

## 2025-07-07 NOTE — TELEPHONE ENCOUNTER
1st attempt.    Sent [employer] message to patient.    Thank you,  Dae, Triage RN Lisa Márquez    3:48 PM 7/7/2025

## 2025-07-07 NOTE — TELEPHONE ENCOUNTER
Due for urine tox    Seen 1/2025  Needs to be seen every 6 months so due for appointment    No upcoming appointment    No CSA since 2016  Please send CSA for her to sign for [facility]     Will fill x 1   While [facility] is gone

## 2025-07-08 NOTE — TELEPHONE ENCOUNTER
Call to patient. Relayed Inessa's message to patient. Patient agreeable to update CSA and urine tox. Appointment set up for physical. Patient will plan to update CSA and urine tox at upcoming appointment.    Jul 16, 2025 10:30 AM  (Arrive by 10:10 AM)  Adult Preventative Visit with David Lafleur MD  [facility] ([facility] ) [phone]       Thank you,  Dae, Triage RN [facility]    11:24 AM 7/8/2025

## 2025-07-26 ENCOUNTER — HEALTH MAINTENANCE LETTER (OUTPATIENT)
Age: 60
End: 2025-07-26

## 2025-08-05 ENCOUNTER — MYC REFILL (OUTPATIENT)
Dept: INTERNAL MEDICINE | Facility: CLINIC | Age: 60
End: 2025-08-05
Payer: COMMERCIAL

## 2025-08-05 DIAGNOSIS — F90.2 ATTENTION DEFICIT HYPERACTIVITY DISORDER (ADHD), COMBINED TYPE: ICD-10-CM

## 2025-08-05 DIAGNOSIS — F41.1 GAD (GENERALIZED ANXIETY DISORDER): ICD-10-CM

## 2025-08-05 RX ORDER — METHYLPHENIDATE HYDROCHLORIDE 20 MG/1
20 TABLET ORAL 2 TIMES DAILY
Qty: 60 TABLET | Refills: 0 | Status: SHIPPED | OUTPATIENT
Start: 2025-08-05

## 2025-08-05 RX ORDER — ALPRAZOLAM 0.5 MG
0.5 TABLET ORAL DAILY PRN
Qty: 15 TABLET | Refills: 0 | Status: SHIPPED | OUTPATIENT
Start: 2025-08-05

## 2025-08-15 PROBLEM — N18.2 CKD (CHRONIC KIDNEY DISEASE), STAGE II: Chronic | Status: ACTIVE | Noted: 2023-10-19

## 2025-08-15 PROBLEM — Z96.643 STATUS POST BILATERAL HIP REPLACEMENTS: Status: ACTIVE | Noted: 2018-11-15

## 2025-08-18 ENCOUNTER — PATIENT OUTREACH (OUTPATIENT)
Dept: CARE COORDINATION | Facility: CLINIC | Age: 60
End: 2025-08-18
Payer: COMMERCIAL

## 2025-09-01 ENCOUNTER — MYC REFILL (OUTPATIENT)
Dept: INTERNAL MEDICINE | Facility: CLINIC | Age: 60
End: 2025-09-01
Payer: COMMERCIAL

## 2025-09-01 DIAGNOSIS — F41.1 GAD (GENERALIZED ANXIETY DISORDER): ICD-10-CM

## 2025-09-01 DIAGNOSIS — F90.2 ATTENTION DEFICIT HYPERACTIVITY DISORDER (ADHD), COMBINED TYPE: ICD-10-CM

## 2025-09-02 RX ORDER — METHYLPHENIDATE HYDROCHLORIDE 20 MG/1
20 TABLET ORAL 2 TIMES DAILY
Qty: 60 TABLET | Refills: 0 | Status: SHIPPED | OUTPATIENT
Start: 2025-09-04 | End: 2025-10-04

## 2025-09-02 RX ORDER — ALPRAZOLAM 0.5 MG
0.5 TABLET ORAL DAILY PRN
Qty: 15 TABLET | Refills: 0 | Status: SHIPPED | OUTPATIENT
Start: 2025-09-04

## (undated) DEVICE — SU VICRYL 3-0 SH 27" UND J416H

## (undated) DEVICE — BLADE KNIFE SURG 15 371115

## (undated) DEVICE — DRILL BIT SYN QUICK CONNECT 1.5X96MM 310.507

## (undated) DEVICE — DRSG GAUZE 4X4" TRAY

## (undated) DEVICE — SOL ADH LIQUID BENZOIN SWAB 0.6ML C1544

## (undated) DEVICE — SYR 10ML FINGER CONTROL W/O NDL 309695

## (undated) DEVICE — DRSG STERI STRIP 1/4X3" R1541

## (undated) DEVICE — SUCTION CANISTER MEDIVAC LINER 3000ML W/LID 65651-530

## (undated) DEVICE — NDL BLUNT 18GA 1" W/O FILTER 305181

## (undated) DEVICE — SU VICRYL 4-0 PS-2 18" UND J496H

## (undated) DEVICE — SOL NACL 0.9% IRRIG 1000ML BOTTLE 2F7124

## (undated) DEVICE — SU VICRYL 5-0 PS-2 18" UND J495H

## (undated) DEVICE — DRSG KERLIX 4 1/2"X4YDS ROLL 6730

## (undated) DEVICE — GLOVE PROTEXIS POWDER FREE 7.5 ORTHOPEDIC 2D73ET75

## (undated) DEVICE — DRSG STERI STRIP 1/2X4" R1547

## (undated) DEVICE — CAST PADDING 4" UNSTERILE 9044

## (undated) DEVICE — DRAPE C-ARM MINI 5423

## (undated) DEVICE — IMP SCR SYN CORTEX T8 STARDRIVE 2.7X14MM SELF TAP 202.874
Type: IMPLANTABLE DEVICE | Site: FOOT | Status: NON-FUNCTIONAL
Removed: 2018-07-06

## (undated) DEVICE — NDL 25GA 1.5" 305127

## (undated) DEVICE — BLADE SAW OSCILLATING STRYK MED 9.0X25X0.38MM 2296-003-111

## (undated) DEVICE — ESU GROUND PAD ADULT W/CORD E7507

## (undated) DEVICE — TOURNIQUET CUFF 18" REPRO RED 60-7070-103

## (undated) DEVICE — BNDG KLING 4" 2236

## (undated) DEVICE — LINEN TOWEL PACK X10 5473

## (undated) DEVICE — PACK LOWER EXTREMITY RIDGES

## (undated) DEVICE — PREP CHLORAPREP 26ML TINTED ORANGE  260815

## (undated) DEVICE — KIT ENDO TURNOVER/PROCEDURE W/CLEAN A SCOPE LINERS 103888

## (undated) RX ORDER — HYDROMORPHONE HYDROCHLORIDE 1 MG/ML
INJECTION, SOLUTION INTRAMUSCULAR; INTRAVENOUS; SUBCUTANEOUS
Status: DISPENSED
Start: 2018-07-06

## (undated) RX ORDER — LIDOCAINE HYDROCHLORIDE 20 MG/ML
INJECTION, SOLUTION INFILTRATION; PERINEURAL
Status: DISPENSED
Start: 2018-07-06

## (undated) RX ORDER — FENTANYL CITRATE 50 UG/ML
INJECTION, SOLUTION INTRAMUSCULAR; INTRAVENOUS
Status: DISPENSED
Start: 2018-07-06

## (undated) RX ORDER — KETAMINE HYDROCHLORIDE 10 MG/ML
INJECTION INTRAMUSCULAR; INTRAVENOUS
Status: DISPENSED
Start: 2018-07-06

## (undated) RX ORDER — CEFAZOLIN SODIUM 2 G/100ML
INJECTION, SOLUTION INTRAVENOUS
Status: DISPENSED
Start: 2018-07-06

## (undated) RX ORDER — BUPIVACAINE HYDROCHLORIDE 5 MG/ML
INJECTION, SOLUTION EPIDURAL; INTRACAUDAL
Status: DISPENSED
Start: 2018-07-06

## (undated) RX ORDER — PROPOFOL 10 MG/ML
INJECTION, EMULSION INTRAVENOUS
Status: DISPENSED
Start: 2018-07-06

## (undated) RX ORDER — HYDROCODONE BITARTRATE AND ACETAMINOPHEN 5; 325 MG/1; MG/1
TABLET ORAL
Status: DISPENSED
Start: 2018-07-06

## (undated) RX ORDER — FENTANYL CITRATE 50 UG/ML
INJECTION, SOLUTION INTRAMUSCULAR; INTRAVENOUS
Status: DISPENSED
Start: 2018-06-15